# Patient Record
Sex: FEMALE | Race: WHITE | NOT HISPANIC OR LATINO | Employment: UNEMPLOYED | URBAN - METROPOLITAN AREA
[De-identification: names, ages, dates, MRNs, and addresses within clinical notes are randomized per-mention and may not be internally consistent; named-entity substitution may affect disease eponyms.]

---

## 2019-12-02 NOTE — TELEPHONE ENCOUNTER
----- Message from Jazmine Lipscomb sent at 12/2/2019 10:09 AM CST -----  Contact: Megha De Anda is requesting a refill on Qsymia 7.5mg/46mg. [facility] [address] [place] pharm number: [phone]. Pt can be contacted at [phone].

## 2019-12-02 NOTE — TELEPHONE ENCOUNTER
Spoke with patient states she will be following you from [facility] said she been out of meds needs a refill.

## 2020-01-15 ENCOUNTER — TELEPHONE (OUTPATIENT)
Dept: FAMILY MEDICINE | Facility: CLINIC | Age: 35
End: 2020-01-15

## 2020-01-15 NOTE — TELEPHONE ENCOUNTER
It's difficult to say without examining it but it sounds like De Quervain's Tenosynovitis.  I'd recommend taking aleve twice daily for 1 week and if it doesn't resolve, follow up with ortho (specifically hand/wrist surgeon like Dr. Lanre Isaac at [facility], Dr. Claude Williams at [facility], Dr. Robert Lund, Dr. Jorge Cardoza).

## 2020-01-15 NOTE — TELEPHONE ENCOUNTER
Patient states she picked up her child and hurt her wrist. She stated it go better but Sunday it started with pain, pressure and burning in her right wrist closest to her thumb.She states that it is a pulling and burning pain where she makes a fist or moves her hand a certain way.  Next available appointment at [facility] is not until February. Patient wanted to know what Dr. Parker would like he to do.   She is a former  patient

## 2020-01-15 NOTE — TELEPHONE ENCOUNTER
----- Message from Lisha Zee sent at 1/15/2020  8:23 AM CST -----  Contact: pt  Pt called department requesting an appointment with this provider. Provider has seen this provider with [facility] and is transitioning over to [facility]. Pt is requesting to see this provider soon due to a burning sensation in her right hand. Pt stated she is unsure of what Dr. Parker want's her to do regarding her hand. There are no appointments showing in Epic until 3/2020. Is it anyway this pt can be worked in to the schedule for a sooner appointment? Please contact pt to advise.

## 2020-01-15 NOTE — TELEPHONE ENCOUNTER
Patient states that she has been taking tylenol and aleve for two weeks since she hurt her wrist. The pulling and burning pain just started on Monday. Patient did make an appointment to see Dr. Parker to [facility] on 2/5.

## 2020-01-21 ENCOUNTER — PATIENT OUTREACH (OUTPATIENT)
Dept: ADMINISTRATIVE | Facility: HOSPITAL | Age: 35
End: 2020-01-21

## 2020-02-05 ENCOUNTER — OFFICE VISIT (OUTPATIENT)
Dept: FAMILY MEDICINE | Facility: CLINIC | Age: 35
End: 2020-02-05
Payer: COMMERCIAL

## 2020-02-05 VITALS
SYSTOLIC BLOOD PRESSURE: 118 MMHG | DIASTOLIC BLOOD PRESSURE: 74 MMHG | HEIGHT: 61 IN | BODY MASS INDEX: 32.1 KG/M2 | TEMPERATURE: 98 F | WEIGHT: 170 LBS | HEART RATE: 64 BPM | OXYGEN SATURATION: 99 %

## 2020-02-05 DIAGNOSIS — E66.9 OBESITY, UNSPECIFIED CLASSIFICATION, UNSPECIFIED OBESITY TYPE, UNSPECIFIED WHETHER SERIOUS COMORBIDITY PRESENT: ICD-10-CM

## 2020-02-05 DIAGNOSIS — M25.511 ACUTE PAIN OF RIGHT SHOULDER: ICD-10-CM

## 2020-02-05 DIAGNOSIS — E03.9 HYPOTHYROIDISM (ACQUIRED): Primary | ICD-10-CM

## 2020-02-05 PROCEDURE — 99999 PR PBB SHADOW E&M-EST. PATIENT-LVL III: ICD-10-PCS | Mod: PBBFAC,,, | Performed by: INTERNAL MEDICINE

## 2020-02-05 PROCEDURE — 99213 OFFICE O/P EST LOW 20 MIN: CPT | Mod: S$GLB,,, | Performed by: INTERNAL MEDICINE

## 2020-02-05 PROCEDURE — 99213 PR OFFICE/OUTPT VISIT, EST, LEVL III, 20-29 MIN: ICD-10-PCS | Mod: S$GLB,,, | Performed by: INTERNAL MEDICINE

## 2020-02-05 PROCEDURE — 3008F BODY MASS INDEX DOCD: CPT | Mod: CPTII,S$GLB,, | Performed by: INTERNAL MEDICINE

## 2020-02-05 PROCEDURE — 3008F PR BODY MASS INDEX (BMI) DOCUMENTED: ICD-10-PCS | Mod: CPTII,S$GLB,, | Performed by: INTERNAL MEDICINE

## 2020-02-05 PROCEDURE — 99999 PR PBB SHADOW E&M-EST. PATIENT-LVL III: CPT | Mod: PBBFAC,,, | Performed by: INTERNAL MEDICINE

## 2020-02-05 RX ORDER — LEVOTHYROXINE SODIUM 25 UG/1
25 TABLET ORAL
Qty: 90 TABLET | Refills: 3 | Status: SHIPPED | OUTPATIENT
Start: 2020-02-05 | End: 2020-06-05

## 2020-02-05 RX ORDER — LEVOTHYROXINE SODIUM 25 UG/1
25 TABLET ORAL
COMMUNITY
End: 2020-02-05 | Stop reason: SDUPTHER

## 2020-02-05 NOTE — PROGRESS NOTES
[facility] Note    Chief Complaint      Chief Complaint   Patient presents with    Establish Care    Shoulder pain       History of Present Illness      Megha Machado is a 34 y.o. female with chronic conditions of hypothyroidism, obesity who presents today for: re-establish care from  and review chronic conditions.  Reports right shoulder pain with certain movements.  Located on top of shoulder and radiates posteriorly.  Present for the past 2 weeks intermittently.    Hypothyroidism: Controlled on synthroid.  Requesting labs.  Obesity: doing very well with Qsymia.  Has lost 30 lbs since starting qsymia.    Flu shot declines.  Td declines.    Mammogram will discuss baseline with Dr. Rogel.  PAP smear UTD with DR. Rogel.    Past Medical History:  History reviewed. No pertinent past medical history.    Past Surgical History:   has no past surgical history on file.    Family History:  family history is not on file.     Social History:  Social History     Tobacco Use    Smoking status: Never Smoker   Substance Use Topics    Alcohol use: Yes    Drug use: Not on file       Review of Systems   Constitutional: Negative for chills, fever and malaise/fatigue.   Respiratory: Negative for shortness of breath.    Cardiovascular: Negative for chest pain.   Gastrointestinal: Negative for constipation, diarrhea, nausea and vomiting.   Skin: Negative for rash.   Neurological: Negative for weakness.        Medications:  Outpatient Encounter Medications as of 2/5/2020   Medication Sig Dispense Refill    levothyroxine (SYNTHROID) 25 MCG tablet Take 1 tablet (25 mcg total) by mouth before breakfast. 90 tablet 3    phentermine-topiramate (QSYMIA) 7.5-46 mg CM24 Take 1 tablet by mouth daily. 30 capsule 2    [DISCONTINUED] levothyroxine (SYNTHROID) 25 MCG tablet Take 25 mcg by mouth before breakfast.      [DISCONTINUED] phentermine-topiramate (QSYMIA) 7.5-46 mg CM24 Take 1 tablet by mouth daily. 30 capsule 2  "    No facility-administered encounter medications on file as of 2/5/2020.        Allergies:  Review of patient's allergies indicates:   Allergen Reactions    Adhesive      tape    Penicillins        Health Maintenance:    There is no immunization history on file for this patient.   Health Maintenance   Topic Date Due    Lipid Panel  1984    TETANUS VACCINE  10/29/2002    Pap Smear with HPV Cotest  10/29/2005        Physical Exam      Vital Signs  Temp: 98 °F (36.7 °C)  Temp src: Oral  Pulse: 64  SpO2: 99 %  BP: 118/74  BP Location: Right arm  Patient Position: Sitting  Pain Score:   1  Pain Loc: Shoulder  Height and Weight  Height: 5' 1" (154.9 cm)  Weight: 77.1 kg (169 lb 15.6 oz)  BSA (Calculated - sq m): 1.82 sq meters  BMI (Calculated): 32.1  Weight in (lb) to have BMI = 25: 132]    Physical Exam   Constitutional: She appears well-developed and well-nourished.   HENT:   Head: Normocephalic and atraumatic.   Right Ear: External ear normal.   Left Ear: External ear normal.   Mouth/Throat: Oropharynx is clear and moist.   Eyes: Pupils are equal, round, and reactive to light. Conjunctivae and EOM are normal.   Neck: Carotid bruit is not present.   Cardiovascular: Normal rate, regular rhythm, normal heart sounds and intact distal pulses.   No murmur heard.  Pulmonary/Chest: Effort normal and breath sounds normal. She has no wheezes. She has no rales.   Abdominal: Soft. Bowel sounds are normal. She exhibits no distension. There is no hepatosplenomegaly. There is no tenderness.   Musculoskeletal: She exhibits no edema.        Right shoulder: She exhibits tenderness (AC joint).   Negative neer, pato balderrama, empty can, lift off exams on right shoulder   Vitals reviewed.       Laboratory:  CBC:      CMP:        Invalid input(s): CREATININ  URINALYSIS:       LIPIDS:      TSH:      A1C:        Assessment/Plan     Megha Machado is a 34 y.o.female with:    1. Hypothyroidism (acquired)  - levothyroxine " (SYNTHROID) 25 MCG tablet; Take 1 tablet (25 mcg total) by mouth before breakfast.  Dispense: 90 tablet; Refill: 3  Continue current meds.  Will reuqest last labs.  2. Obesity, unspecified classification, unspecified obesity type, unspecified whether serious comorbidity present  - phentermine-topiramate (QSYMIA) 7.5-46 mg CM24; Take 1 tablet by mouth daily.  Dispense: 30 capsule; Refill: 2  Continue current meds.    3. Acute pain of right shoulder  - X-ray Shoulder 2 or More Views Right; Future  TTP over AC joint.  Rotator cuff exam did not elicit symptoms or weakness. Will get xrays.    Chronic conditions status updated as per HPI.  Other than changes above, cont current medications and maintain follow up with specialists.  Return to clinic in 6 months.    Julio Parker MD  [facility]

## 2020-02-07 ENCOUNTER — HOSPITAL ENCOUNTER (OUTPATIENT)
Dept: RADIOLOGY | Facility: HOSPITAL | Age: 35
Discharge: HOME | End: 2020-02-07
Attending: INTERNAL MEDICINE
Payer: COMMERCIAL

## 2020-02-07 DIAGNOSIS — M25.511 ACUTE PAIN OF RIGHT SHOULDER: ICD-10-CM

## 2020-02-07 DIAGNOSIS — M25.511 CHRONIC RIGHT SHOULDER PAIN: Primary | ICD-10-CM

## 2020-02-07 DIAGNOSIS — G89.29 CHRONIC RIGHT SHOULDER PAIN: Primary | ICD-10-CM

## 2020-02-07 PROCEDURE — 73030 XR SHOULDER COMPLETE 2 OR MORE VIEWS RIGHT: ICD-10-PCS | Mod: 26,RT,, | Performed by: RADIOLOGY

## 2020-02-07 PROCEDURE — 73030 X-RAY EXAM OF SHOULDER: CPT | Mod: 26,RT,, | Performed by: RADIOLOGY

## 2020-02-07 PROCEDURE — 73030 X-RAY EXAM OF SHOULDER: CPT | Mod: TC,FY,RT

## 2020-02-07 NOTE — PROGRESS NOTES
Structures in shoulder look ok.  This points to a muscular problem or rotator cuff.  Referring to ortho to further evaluate.

## 2020-02-18 ENCOUNTER — OFFICE VISIT (OUTPATIENT)
Dept: SPORTS MEDICINE | Facility: CLINIC | Age: 35
End: 2020-02-18
Payer: COMMERCIAL

## 2020-02-18 VITALS
DIASTOLIC BLOOD PRESSURE: 90 MMHG | HEIGHT: 61 IN | SYSTOLIC BLOOD PRESSURE: 135 MMHG | HEART RATE: 68 BPM | BODY MASS INDEX: 31.91 KG/M2 | WEIGHT: 169 LBS

## 2020-02-18 DIAGNOSIS — G25.89 SCAPULAR DYSKINESIS: ICD-10-CM

## 2020-02-18 DIAGNOSIS — G89.29 CHRONIC RIGHT SHOULDER PAIN: Primary | ICD-10-CM

## 2020-02-18 DIAGNOSIS — M99.07 SOMATIC DYSFUNCTION OF UPPER EXTREMITY: ICD-10-CM

## 2020-02-18 DIAGNOSIS — M99.01 SOMATIC DYSFUNCTION OF CERVICAL REGION: ICD-10-CM

## 2020-02-18 DIAGNOSIS — M99.08 SOMATIC DYSFUNCTION OF RIB CAGE REGION: ICD-10-CM

## 2020-02-18 DIAGNOSIS — M99.02 SOMATIC DYSFUNCTION OF THORACIC REGION: ICD-10-CM

## 2020-02-18 DIAGNOSIS — M25.511 CHRONIC RIGHT SHOULDER PAIN: Primary | ICD-10-CM

## 2020-02-18 PROCEDURE — 98926 PR OSTEOPATHIC MANIP,3-4 BODY REGN: ICD-10-PCS | Mod: S$GLB,,, | Performed by: ORTHOPAEDIC SURGERY

## 2020-02-18 PROCEDURE — 3008F BODY MASS INDEX DOCD: CPT | Mod: CPTII,S$GLB,, | Performed by: ORTHOPAEDIC SURGERY

## 2020-02-18 PROCEDURE — 99204 OFFICE O/P NEW MOD 45 MIN: CPT | Mod: 25,S$GLB,, | Performed by: ORTHOPAEDIC SURGERY

## 2020-02-18 PROCEDURE — 97110 THERAPEUTIC EXERCISES: CPT | Mod: S$GLB,,, | Performed by: ORTHOPAEDIC SURGERY

## 2020-02-18 PROCEDURE — 3008F PR BODY MASS INDEX (BMI) DOCUMENTED: ICD-10-PCS | Mod: CPTII,S$GLB,, | Performed by: ORTHOPAEDIC SURGERY

## 2020-02-18 PROCEDURE — 98926 OSTEOPATH MANJ 3-4 REGIONS: CPT | Mod: S$GLB,,, | Performed by: ORTHOPAEDIC SURGERY

## 2020-02-18 PROCEDURE — 99999 PR PBB SHADOW E&M-EST. PATIENT-LVL III: CPT | Mod: PBBFAC,,, | Performed by: ORTHOPAEDIC SURGERY

## 2020-02-18 PROCEDURE — 97110 PR THERAPEUTIC EXERCISES: ICD-10-PCS | Mod: S$GLB,,, | Performed by: ORTHOPAEDIC SURGERY

## 2020-02-18 PROCEDURE — 99204 PR OFFICE/OUTPT VISIT, NEW, LEVL IV, 45-59 MIN: ICD-10-PCS | Mod: 25,S$GLB,, | Performed by: ORTHOPAEDIC SURGERY

## 2020-02-18 PROCEDURE — 99999 PR PBB SHADOW E&M-EST. PATIENT-LVL III: ICD-10-PCS | Mod: PBBFAC,,, | Performed by: ORTHOPAEDIC SURGERY

## 2020-02-18 RX ORDER — MELOXICAM 15 MG/1
15 TABLET ORAL DAILY
Qty: 30 TABLET | Refills: 0 | Status: SHIPPED | OUTPATIENT
Start: 2020-02-18 | End: 2022-03-15 | Stop reason: SDUPTHER

## 2020-02-18 NOTE — PROGRESS NOTES
CC: right shoulder pain    34 y.o. Female presents today for evaluation of her right shoulder pain. Patient is unable to recall a specific mechanism of shoulder injury. She reports she feels as though he pain may be spurring from the way she sleeps in a crowded bed with her , dog and three year old son. Patient states she fractured her clavicle as a child while jumping on the bed, and otherwise denies prior history of shoulder injury. When asked where her pain is located she gestures to the superior aspect of her shoulder and the right side of her neck. She describes the quality of her pain as sharp, shooting and throbbing. Patient denies this problem waking her up from her sleep. Denies recent falls or trauma.   How long: Patient reports she has been experiencing right shoulder pain for approximately one month.   What makes it better: Patient admits to her pain improving with rest.   What makes it worse: Patient admits to increased pain with lifting her arm overhead, shrugging her shoulders, and doing repetitive cleaning motions while at work.   Does it radiate: Patient admits to her pain radiating from the superior aspect of her shoulder to the right side of her neck.  Attempted treatments: Patient admits to taking ibuprofen 800 mg as needed. She also states she has tried taking Naprosyn for this problem. She denies prior history of injection or [facility]. Denies formal physical therapy. Denies the use of ice or heat.   Pain score: 0/10  Any mechanical symptoms: Denies mechanical symptoms.  Feelings of instability: Denies feelings of instability.   Affecting ADLs: Patient admits to this problem affecting her ability to perform ADLs. 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     REVIEW OF SYSTEMS:   Constitution: Patient denies fever, chills, night sweats, and weight changes.  Eyes: Patient denies eye pain or vision  change.  HENT: Patient denies any headache, ear pain, sore throat, or nasal discharge.  CVS: Patient denies chest pain.  Lungs: Patient denies any shortness of breath or cough.  Abd: Patient denies any stomach pain, nausea, or vomiting.  Skin: Patient denies any skin rash or itching.    Hematologic/Lymphatic: Patient denies any bleeding problems, or easy bruising.   Musculoskeletal: Patient denies any recent falls. See HPI.  Psych: Patient denies any current anxiety or nervousness.    PAST MEDICAL HISTORY:   History reviewed. No pertinent past medical history.    PAST SURGICAL HISTORY:   History reviewed. No pertinent surgical history.    FAMILY HISTORY:   History reviewed. No pertinent family history.    SOCIAL HISTORY:   Social History     Socioeconomic History    Marital status:      Spouse name: Not on file    Number of children: Not on file    Years of education: Not on file    Highest education level: Not on file   Occupational History    Not on file   Social Needs    Financial resource strain: Not on file    Food insecurity:     Worry: Not on file     Inability: Not on file    Transportation needs:     Medical: Not on file     Non-medical: Not on file   Tobacco Use    Smoking status: Never Smoker   Substance and Sexual Activity    Alcohol use: Yes    Drug use: Not on file    Sexual activity: Not on file   Lifestyle    Physical activity:     Days per week: Not on file     Minutes per session: Not on file    Stress: Not on file   Relationships    Social connections:     Talks on phone: Not on file     Gets together: Not on file     Attends Zoroastrianism service: Not on file     Active member of club or organization: Not on file     Attends meetings of clubs or organizations: Not on file     Relationship status: Not on file   Other Topics Concern    Not on file   Social History Narrative    Not on file       MEDICATIONS:     Current Outpatient Medications:     levothyroxine (SYNTHROID) 25  "MCG tablet, Take 1 tablet (25 mcg total) by mouth before breakfast., Disp: 90 tablet, Rfl: 3    phentermine-topiramate (QSYMIA) 7.5-46 mg CM24, Take 1 tablet by mouth daily., Disp: 30 capsule, Rfl: 2    meloxicam (MOBIC) 15 MG tablet, Take 1 tablet (15 mg total) by mouth once daily., Disp: 30 tablet, Rfl: 0    ALLERGIES:   Review of patient's allergies indicates:   Allergen Reactions    Adhesive      tape    Penicillins         PHYSICAL EXAMINATION:  BP (!) 135/90   Pulse 68   Ht 5' 1" (1.549 m)   Wt 76.7 kg (169 lb)   BMI 31.93 kg/m²   Vitals signs and nursing note have been reviewed.  General: In no acute distress, well developed, well nourished, no diaphoresis  Eyes: EOM full and smooth, no eye redness or discharge  HENT: normocephalic and atraumatic, neck supple, trachea midline, no nasal discharge, no external ear redness or discharge  Cardiovascular: 2+ and symmetric radial bilaterally, no LE edema  Lungs: respirations non-labored, no conversational dyspnea   Abd: non-distended, no rigidity  MSK: no amputation or deformity, no swelling of extremities  Neuro: AAOx3, CN2-12 grossly intact  Skin: No rashes, warm and dry  Psychiatric: cooperative, pleasant, mood and affect appropriate for age    SHOULDER: RIGHT  The affected shoulder is compared to the contralateral shoulder.    Observation:    CERVICAL SPINE  Normal head carriage. Normal thoracic kyphosis.  Full AROM in flexion, extension, sidebending, and rotation.    SHOULDER  No ecchymosis, edema, or erythema throughout the shoulder girdle.  No sternal, clavicular, or acromial deformities bilaterally.  No atrophy of the pectorals, deltoids, supraspinatus, infraspinatus, or biceps bilaterally.  No asymmetry of shoulders bilaterally.    ROM:  Active flexion to 180° on left and 180° on right.   Active abduction to 180° on left and 180° on right.    Active internal rotation to T7 on left and T7 on right.    Active external rotation to T4 on left and T4 on " right.    Scapular dyskinesia on the right    Tenderness:  No tenderness at the SC or AC joint  No tenderness over the clavicle   No tenderness over biceps tendon or bicipital groove  No tenderness over subacromial space  + tenderness along the trapezius muscle on the right    Strength Testing:  Deltoid - 5/5 on left and 5/5 on right  Biceps - 5/5 on left and 5/5 on right  Triceps - 5/5 on left and 5/5 on right  Wrist extension - 5/5 on left and 5/5 on right  Wrist flexion - 5/5 on left and 5/5 on right   - 5/5 on left and 5/5 on right  Finger extension - 5/5 on left and 5/5 on right  Finger abduction - 5/5 on left and 5/5 on right    Special Tests:  Empty can test - negative  Full can test - negative  Bear hug test - negative  Belly press test - negative  Resisted internal rotation - negative  Resisted external rotation - negative    Neer's test - negative  Hawkin's-Sukhjinder test - positive    ORonnis test - negative    Speed's test - negative  Yergason's test - negative    Sulcus sign - none  AP load and shift laxity - none    Posture:  Upright, Increased thoracic kyphosis and Anterior head carriage  Gait: Non-antalgic with Neutral ankle mechanics    TART (Tissue texture abnormality, Asymmetry,  Restriction of motion and/or Tenderness) changes:       Cervical Spine  Thoracic Spine  Lumbar Spine   C1 Neutral T1 TTAR L1    C2 Neutral T2 TTAR L2    C3 TTAR T3 NSRRL L3    C4 TTAR T4 NSRRL L4    C5 TTAR T5 NSRRL L5    C6 TTAR T6 Neutral     C7 TTAR T7 ERSR       T8 FRSR       T9 Neutral       T10 Neutral       T11 Neutral       T12 Neutral       Ribs:  Superior rib 1 on the right  External torsion rib 2-3 on the right  Internal torsion rib 6 on the right    Upper extremity:  Myofascial restriction of the right trapezius muscle and right periscapular musculature  Fascial herniated trigger points along the superior medial aspect of the right scapula    Key   F= Flexed   E = Extended   R = Rotated   S = Sidebent    TTA = tissue texture abnormality       Neurovascular Exam:  2+ radial pulses BL  Sensation intact to light touch in the distal median, radial, and ulnar nerve distributions bilaterally.  Spurlings test - negative  Lhermittes test - negative  Capillary refill intact <2 seconds in all digits bilaterally      IMAGIN. X-ray obtained 2020 due to right shoulder pain   2. X-ray images were reviewed personally by me and then directly with patient.  3. FINDINGS: X-ray images obtained demonstrate the alignment is within normal limits, there is no fracture and no marrow replacement process.  4. IMPRESSION: No pathology or irregularities appreciated.       ASSESSMENT:      ICD-10-CM ICD-9-CM   1. Chronic right shoulder pain M25.511 719.41    G89.29 338.29   2. Scapular dyskinesis G25.89 781.3   3. Somatic dysfunction of upper extremity M99.07 739.7   4. Somatic dysfunction of rib cage region M99.08 739.8   5. Somatic dysfunction of thoracic region M99.02 739.2   6. Somatic dysfunction of cervical region M99.01 739.1       PLAN:  1-2.  Right shoulder pain/scapular dyskinesis -    - Megha admits to right shoulder pain for approximately 1 month.  She denies any specific injury or trauma.  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  When asked where she hurts, she motions to the trapezius and posterior shoulder with 3 fingers.    - XRs obtained 2020 and images were personally reviewed with the patient. See above for further detail.    - Based on her body language/description of pain and somatic dysfunction identified on exam, I discussed osteopathic manipulation as a treatment option today. She consents to evaluation and treatment. See below.     - HEP for scapular stabilization, snow angels, large arm circles, and Scottish prescribed today. Handout provided, explained, and exercises were demonstrated as needed. Encouraged to do daily.  [address] HOME  EXERCISE PROGRAM (HEP):  The patient was taught a homegoing physical therapy regimen as described above. The patient demonstrated understanding of the exercises and proper technique of their execution. This interaction took 15 minutes.     - Mobic 15mg daily for 2 weeks followed by as needed.      3-6.  Somatic dysfunction of upper extremity, ribcage, thoracic, cervical regions -     - OMT 3-4 regions. Oral consent obtained. Reviewed benefits and potential side effects. OMT indicated today due to signs and symptoms as well as local and remote somatic dysfunction findings and their related neurokinetic, lymphatic, fascial and/or arteriovenous body connections. OMT techniques used: Soft Tissue, Myofascial Release, Muscle Energy, High Velocity Low Amplitude and Fascial Distortion Model. Treatment was tolerated well. Improvement noted in segmental mobility post-treatment in dysfunctional regions. There were no adverse events and no complications immediately following treatment. Advised plenty of water to help alleviate soreness.      Future planning includes - Possibly more OMT if helpful and if indicated    All questions were answered to the best of my ability and all concerns were addressed at this time.    Follow up for above as needed.      This note is dictated using the M*Modal Fluency Direct word recognition program. There are word recognition mistakes that are occasionally missed on review.

## 2020-02-18 NOTE — LETTER
February 18, 2020      Julio Parker MD  [address] [facility]  [address]  [place] [address]           [address] [address]  Phone: [phone]          Patient: Megha Machado   MR Number: [id number]   YOB: 1985   Date of Visit: 2/18/2020       Dear Dr. Julio Parker:    Thank you for referring Megha Machado to me for evaluation. Attached you will find relevant portions of my assessment and plan of care.    If you have questions, please do not hesitate to call me. I look forward to following Megha Machado along with you.    Sincerely,    Julio Jean Baptiste, DO    Enclosure  CC:  No Recipients    If you would like to receive this communication electronically, please contact [email or web address] or [phone] to request more information on [employer] access.    For providers and/or their staff who would like to refer a patient to [facility], please contact us through our one-stop-shop provider referral line, [facility], at [phone].    If you feel you have received this communication in error or would no longer like to receive these types of communications, please e-mail [email or web address]

## 2020-04-01 ENCOUNTER — PATIENT MESSAGE (OUTPATIENT)
Dept: FAMILY MEDICINE | Facility: CLINIC | Age: 35
End: 2020-04-01

## 2020-04-01 ENCOUNTER — TELEPHONE (OUTPATIENT)
Dept: FAMILY MEDICINE | Facility: CLINIC | Age: 35
End: 2020-04-01

## 2020-04-01 RX ORDER — AZITHROMYCIN 250 MG/1
TABLET, FILM COATED ORAL
Qty: 6 TABLET | Refills: 0 | Status: SHIPPED | OUTPATIENT
Start: 2020-04-01 | End: 2020-04-06

## 2020-04-01 NOTE — TELEPHONE ENCOUNTER
----- Message from Yun Stevenson sent at 4/1/2020  8:15 AM CDT -----  Contact: self    Name of Caller  patient need to speak with nurse.   Patient states has sinus problem no fever no coughing requesting if doctor can call something into [facility] on [address] [phone]  When is the first available appointment  Symptoms:sinus  Best Call Back Number:[phone]  Additional Information:

## 2020-04-01 NOTE — TELEPHONE ENCOUNTER
Pt had technical difficulties.  Started with facial pressure, postnasal drip.  Denies cough, fever, body aches, decreased energy levels.  No known COVID exposure.  No sick contacts.  Has been taking flonase, mucinex and claritin, vit C.  Will call in zpak for symptoms refractory to OTC.

## 2020-05-28 ENCOUNTER — TELEPHONE (OUTPATIENT)
Dept: FAMILY MEDICINE | Facility: CLINIC | Age: 35
End: 2020-05-28

## 2020-05-28 DIAGNOSIS — R23.3 EASY BRUISING: ICD-10-CM

## 2020-05-28 DIAGNOSIS — Z00.00 ANNUAL PHYSICAL EXAM: Primary | ICD-10-CM

## 2020-05-28 DIAGNOSIS — L65.9 HAIR LOSS: ICD-10-CM

## 2020-05-28 NOTE — TELEPHONE ENCOUNTER
Please schedule virtual visit with me next week.  I'm going to send in some labs she can get ahead of time.  Orders are in at [facility].  If pt prefers, can switch to [employer].

## 2020-05-28 NOTE — TELEPHONE ENCOUNTER
----- Message from Jeanette Calderon MA sent at 5/28/2020  9:44 AM CDT -----  Contact: [phone] / self   Last seen 02/05/20  ----- Message -----  From: Jorge Perez  Sent: 5/28/2020   9:34 AM CDT  To: [facility] Staff    Patient would like to speak with your office states she has been bruising extremely easy and her hair is falling out at an alarming rate. Please Advise.

## 2020-06-02 ENCOUNTER — OFFICE VISIT (OUTPATIENT)
Dept: FAMILY MEDICINE | Facility: CLINIC | Age: 35
End: 2020-06-02
Payer: COMMERCIAL

## 2020-06-02 DIAGNOSIS — E03.9 HYPOTHYROIDISM (ACQUIRED): Primary | ICD-10-CM

## 2020-06-02 DIAGNOSIS — R23.3 EASY BRUISING: ICD-10-CM

## 2020-06-02 DIAGNOSIS — L65.9 HAIR LOSS: ICD-10-CM

## 2020-06-02 DIAGNOSIS — R60.0 PERIPHERAL EDEMA: ICD-10-CM

## 2020-06-02 PROCEDURE — 99214 OFFICE O/P EST MOD 30 MIN: CPT | Mod: 95,,, | Performed by: INTERNAL MEDICINE

## 2020-06-02 PROCEDURE — 99214 PR OFFICE/OUTPT VISIT, EST, LEVL IV, 30-39 MIN: ICD-10-PCS | Mod: 95,,, | Performed by: INTERNAL MEDICINE

## 2020-06-02 RX ORDER — TRIAMTERENE/HYDROCHLOROTHIAZID 37.5-25 MG
1 TABLET ORAL DAILY
Qty: 30 TABLET | Refills: 11 | Status: SHIPPED | OUTPATIENT
Start: 2020-06-02 | End: 2024-01-09

## 2020-06-02 NOTE — PROGRESS NOTES
[facility] Virtual Visit Note    The patient location is:[place]  The chief complaint leading to consultation is: complaints  Visit type: Virtual visit with synchronous audio and video  Total time spent with patient: 15 min  Each patient to whom he or she provides medical services by telemedicine is:  (1) informed of the relationship between the physician and patient and the respective role of any other health care provider with respect to management of the patient; and (2) notified that he or she may decline to receive medical services by telemedicine and may withdraw from such care at any time.      Chief Complaint      Chief Complaint   Patient presents with    Bleeding/Bruising    Hair Loss    Leg Swelling       History of Present Illness      Megha Machado is a 34 y.o. female with chronic conditions of hypothyroidism who presents today for: complaints of easy bruising for the past few weeks.  Has noticed two large bruises in the past 2 weeks which have resolved. Has been having easy bruising for the past month but more slightly.  Has also noticed worsened hair loss in the same timeframe.    Has been taking synthroid 25 mcg daily.      Past Medical History:  History reviewed. No pertinent past medical history.    Past Surgical History:   has no past surgical history on file.    Family History:  family history is not on file.     Social History:  Social History     Tobacco Use    Smoking status: Never Smoker   Substance Use Topics    Alcohol use: Yes    Drug use: Not on file       Review of Systems   Constitutional: Negative for chills, fever and malaise/fatigue.   HENT: Negative for hearing loss.    Eyes: Negative for discharge.   Respiratory: Negative for shortness of breath and wheezing.    Cardiovascular: Negative for chest pain and palpitations.   Gastrointestinal: Negative for blood in stool, constipation, diarrhea, nausea and vomiting.   Genitourinary: Negative for dysuria and  hematuria.   Musculoskeletal: Negative for neck pain.   Skin: Negative for rash.   Neurological: Negative for weakness and headaches.   Endo/Heme/Allergies: Positive for polydipsia.        Medications:  Outpatient Encounter Medications as of 6/2/2020   Medication Sig Dispense Refill    levothyroxine (SYNTHROID) 25 MCG tablet Take 1 tablet (25 mcg total) by mouth before breakfast. 90 tablet 3    meloxicam (MOBIC) 15 MG tablet Take 1 tablet (15 mg total) by mouth once daily. 30 tablet 0    phentermine-topiramate (QSYMIA) 7.5-46 mg CM24 Take 1 tablet by mouth daily. 30 capsule 2    triamterene-hydrochlorothiazide 37.5-25 mg (MAXZIDE-25) 37.5-25 mg per tablet Take 1 tablet by mouth once daily. 30 tablet 11     No facility-administered encounter medications on file as of 6/2/2020.        Allergies:  Review of patient's allergies indicates:   Allergen Reactions    Adhesive      tape    Penicillins        Health Maintenance:    There is no immunization history on file for this patient.   Health Maintenance   Topic Date Due    TETANUS VACCINE  10/29/2002    Pap Smear with HPV Cotest  10/29/2005    Lipid Panel  Completed        Physical Exam       ]    Physical Exam   Constitutional: She is oriented to person, place, and time. She appears well-developed and well-nourished. No distress.   Eyes: Conjunctivae and EOM are normal. Right eye exhibits no discharge. Left eye exhibits no discharge. No scleral icterus.   Pulmonary/Chest: Effort normal. No respiratory distress.   Neurological: She is alert and oriented to person, place, and time.   Skin: She is not diaphoretic.   Psychiatric: She has a normal mood and affect. Her behavior is normal. Judgment and thought content normal.        Laboratory:  CBC:  Recent Labs   Lab 06/01/20  0818   WBC 5.5   RBC 4.73   Hemoglobin 13.6   Hematocrit 41.8   Platelets 259   Mean Corpuscular Volume 88.4   Mean Corpuscular Hemoglobin 28.8   Mean Corpuscular Hemoglobin Conc 32.5      CMP:  Recent Labs   Lab 06/01/20  0818   Glucose 92   Calcium 8.7   Albumin 4.0   Total Protein 6.5   Sodium 137   Potassium 4.5   CO2 28   Chloride 104   BUN, Bld 16   Alkaline Phosphatase 94   ALT 23   AST 20   Total Bilirubin 0.4     URINALYSIS:       LIPIDS:  Recent Labs   Lab 06/01/20  0818   HDL 54   Cholesterol 220 H   Triglycerides 74   LDL Cholesterol 148 H   Hdl/Cholesterol Ratio 4.1   Non HDL Chol. (LDL+VLDL) 166 H     TSH:      A1C:        Assessment/Plan     Megha Machado is a 34 y.o.female with:    1. Peripheral edema, new to me  - triamterene-hydrochlorothiazide 37.5-25 mg (MAXZIDE-25) 37.5-25 mg per tablet; Take 1 tablet by mouth once daily.  Dispense: 30 tablet; Refill: 11  Recommended compression, elevation, sodium restriction.  Possibly due to undertreated hypothyroidism, labs pending.  Will give maxzide to control as needed.  2. Easy bruising, new problem  Labs ordered, reviewed with pt.  No signs of underlying clotting disorder.    3. Hair loss, new problem  Repeat thyroid labs ordered/pending.  May need to increase synthroid dose.    4. Hypothyroidism (acquired)  See above.        Julio Parker MD  [facility]                Answers for HPI/ROS submitted by the patient on 5/31/2020   activity change: No  unexpected weight change: Yes  rhinorrhea: No  trouble swallowing: No  visual disturbance: No  chest tightness: No  polyuria: No  difficulty urinating: No  menstrual problem: No  joint swelling: Yes  arthralgias: No  confusion: No  dysphoric mood: No

## 2020-06-04 ENCOUNTER — TELEPHONE (OUTPATIENT)
Dept: FAMILY MEDICINE | Facility: CLINIC | Age: 35
End: 2020-06-04

## 2020-06-04 DIAGNOSIS — E03.9 HYPOTHYROIDISM (ACQUIRED): ICD-10-CM

## 2020-06-04 LAB
ALBUMIN SERPL-MCNC: 4 G/DL (ref 3.6–5.1)
ALBUMIN/GLOB SERPL: 1.6 (CALC) (ref 1–2.5)
ALP SERPL-CCNC: 94 U/L (ref 31–125)
ALT SERPL-CCNC: 23 U/L (ref 6–29)
APTT PPP: 32 SEC (ref 22–34)
AST SERPL-CCNC: 20 U/L (ref 10–30)
BASOPHILS # BLD AUTO: 61 CELLS/UL (ref 0–200)
BASOPHILS NFR BLD AUTO: 1.1 %
BILIRUB SERPL-MCNC: 0.4 MG/DL (ref 0.2–1.2)
BUN SERPL-MCNC: 16 MG/DL (ref 7–25)
BUN/CREAT SERPL: NORMAL (CALC) (ref 6–22)
CALCIUM SERPL-MCNC: 8.7 MG/DL (ref 8.6–10.2)
CHLORIDE SERPL-SCNC: 104 MMOL/L (ref 98–110)
CHOLEST SERPL-MCNC: 220 MG/DL
CHOLEST/HDLC SERPL: 4.1 (CALC)
CO2 SERPL-SCNC: 28 MMOL/L (ref 20–32)
CREAT SERPL-MCNC: 0.86 MG/DL (ref 0.5–1.1)
EOSINOPHIL # BLD AUTO: 209 CELLS/UL (ref 15–500)
EOSINOPHIL NFR BLD AUTO: 3.8 %
ERYTHROCYTE [DISTWIDTH] IN BLOOD BY AUTOMATED COUNT: 12.9 % (ref 11–15)
GFRSERPLBLD MDRD-ARVRAT: 88 ML/MIN/1.73M2
GLOBULIN SER CALC-MCNC: 2.5 G/DL (CALC) (ref 1.9–3.7)
GLUCOSE SERPL-MCNC: 92 MG/DL (ref 65–99)
HCT VFR BLD AUTO: 41.8 % (ref 35–45)
HDLC SERPL-MCNC: 54 MG/DL
HGB BLD-MCNC: 13.6 G/DL (ref 11.7–15.5)
INR PPP: 1
LDLC SERPL CALC-MCNC: 148 MG/DL (CALC)
LYMPHOCYTES # BLD AUTO: 1650 CELLS/UL (ref 850–3900)
LYMPHOCYTES NFR BLD AUTO: 30 %
MCH RBC QN AUTO: 28.8 PG (ref 27–33)
MCHC RBC AUTO-ENTMCNC: 32.5 G/DL (ref 32–36)
MCV RBC AUTO: 88.4 FL (ref 80–100)
MONOCYTES # BLD AUTO: 545 CELLS/UL (ref 200–950)
MONOCYTES NFR BLD AUTO: 9.9 %
NEUTROPHILS # BLD AUTO: 3036 CELLS/UL (ref 1500–7800)
NEUTROPHILS NFR BLD AUTO: 55.2 %
NONHDLC SERPL-MCNC: 166 MG/DL (CALC)
PLATELET # BLD AUTO: 259 THOUSAND/UL (ref 140–400)
PMV BLD REES-ECKER: 10.7 FL (ref 7.5–12.5)
POTASSIUM SERPL-SCNC: 4.5 MMOL/L (ref 3.5–5.3)
PROT SERPL-MCNC: 6.5 G/DL (ref 6.1–8.1)
PROTHROMBIN TIME: 10.3 SEC (ref 9–11.5)
RBC # BLD AUTO: 4.73 MILLION/UL (ref 3.8–5.1)
SODIUM SERPL-SCNC: 137 MMOL/L (ref 135–146)
T4 FREE SERPL-MCNC: 1 NG/DL (ref 0.8–1.8)
TRIGL SERPL-MCNC: 74 MG/DL
TSH SERPL-ACNC: 4.82 MIU/L
WBC # BLD AUTO: 5.5 THOUSAND/UL (ref 3.8–10.8)

## 2020-06-04 NOTE — TELEPHONE ENCOUNTER
----- Message from Janeth Morris sent at 6/4/2020  3:28 PM CDT -----  Contact: self [phone]  Patient called in requesting to speak with you. Patient prefers to speak with a nurse. Please advise.

## 2020-06-05 RX ORDER — LEVOTHYROXINE SODIUM 50 UG/1
50 TABLET ORAL
Qty: 90 TABLET | Refills: 3 | Status: SHIPPED | OUTPATIENT
Start: 2020-06-05 | End: 2020-08-05

## 2020-06-05 NOTE — TELEPHONE ENCOUNTER
Pt asking for results on free t4, said when you went over all of her other labs, this one was not back yet.

## 2020-06-05 NOTE — TELEPHONE ENCOUNTER
----- Message from Nisha Valle sent at 6/5/2020 11:22 AM CDT -----  Contact: [phone]/ self   Patient requesting to speak with you regarding test results. Please call.

## 2020-06-05 NOTE — TELEPHONE ENCOUNTER
Since thyroid function was borderline low, I want to try increasing synthroid to 50 mcg and see if there is any noticeable improvement.  New prescription sent

## 2020-06-05 NOTE — TELEPHONE ENCOUNTER
Pt aware, asking what could be causing the bruising/hairloss. Asking if she should stay off of the meds?

## 2020-07-06 ENCOUNTER — TELEPHONE (OUTPATIENT)
Dept: FAMILY MEDICINE | Facility: CLINIC | Age: 35
End: 2020-07-06

## 2020-07-06 NOTE — TELEPHONE ENCOUNTER
When she says we changed her meds, does she mean the fluid pill we started at last visit?  Or change in dose of thyroid medication?

## 2020-07-06 NOTE — TELEPHONE ENCOUNTER
For the last 2-3 days has been getting very nauseated after she eats or drinks, anything including water. You did change her medications not sure if that Is what is causing it.

## 2020-07-06 NOTE — TELEPHONE ENCOUNTER
Try taking pepcid twice daily for a few days to see if that helps.  If it gets much worse, go to urgent care.  If the pepcid helps, call in a few days to report progress.

## 2020-07-06 NOTE — TELEPHONE ENCOUNTER
----- Message from Radha Castañeda sent at 7/6/2020  8:53 AM CDT -----  Contact: SELF [phone]  Patient would like to speak with you about having nausea after eating and drinking. Please advise

## 2020-07-06 NOTE — TELEPHONE ENCOUNTER
Patient states she is not sure if it is the medication because she has been on medication for a while. Patient states she is not pregnant ,she do not know what is going on to make her feel this way

## 2020-07-22 ENCOUNTER — TELEPHONE (OUTPATIENT)
Dept: ADMINISTRATIVE | Facility: HOSPITAL | Age: 35
End: 2020-07-22

## 2020-07-22 ENCOUNTER — PATIENT OUTREACH (OUTPATIENT)
Dept: ADMINISTRATIVE | Facility: HOSPITAL | Age: 35
End: 2020-07-22

## 2020-07-27 ENCOUNTER — TELEPHONE (OUTPATIENT)
Dept: FAMILY MEDICINE | Facility: CLINIC | Age: 35
End: 2020-07-27

## 2020-07-27 DIAGNOSIS — E03.9 HYPOTHYROIDISM (ACQUIRED): Primary | ICD-10-CM

## 2020-07-27 NOTE — TELEPHONE ENCOUNTER
----- Message from René Rivera sent at 7/27/2020  3:34 PM CDT -----  Type:  Needs Medical Advice    Who Called: self  Reason:Patient isnt sure if you want her to have blood work before her appointment or not. If you do then she is going to need orders   Would the patient rather a call back or a response via [facility]? callback  Best Call Back Number: [phone]  Additional Information: none

## 2020-08-01 LAB — TSH SERPL-ACNC: 3.29 MIU/L

## 2020-08-05 ENCOUNTER — OFFICE VISIT (OUTPATIENT)
Dept: FAMILY MEDICINE | Facility: CLINIC | Age: 35
End: 2020-08-05
Payer: COMMERCIAL

## 2020-08-05 ENCOUNTER — TELEPHONE (OUTPATIENT)
Dept: ADMINISTRATIVE | Facility: HOSPITAL | Age: 35
End: 2020-08-05

## 2020-08-05 VITALS
TEMPERATURE: 98 F | HEART RATE: 64 BPM | HEIGHT: 61 IN | SYSTOLIC BLOOD PRESSURE: 102 MMHG | OXYGEN SATURATION: 100 % | BODY MASS INDEX: 32.63 KG/M2 | DIASTOLIC BLOOD PRESSURE: 70 MMHG | WEIGHT: 172.81 LBS

## 2020-08-05 DIAGNOSIS — E66.9 OBESITY, UNSPECIFIED CLASSIFICATION, UNSPECIFIED OBESITY TYPE, UNSPECIFIED WHETHER SERIOUS COMORBIDITY PRESENT: ICD-10-CM

## 2020-08-05 DIAGNOSIS — R60.0 PERIPHERAL EDEMA: ICD-10-CM

## 2020-08-05 DIAGNOSIS — E03.9 HYPOTHYROIDISM (ACQUIRED): Primary | ICD-10-CM

## 2020-08-05 DIAGNOSIS — Z11.59 SCREENING FOR VIRAL DISEASE: ICD-10-CM

## 2020-08-05 DIAGNOSIS — Z11.4 SCREENING FOR HIV (HUMAN IMMUNODEFICIENCY VIRUS): ICD-10-CM

## 2020-08-05 PROCEDURE — 3008F BODY MASS INDEX DOCD: CPT | Mod: CPTII,S$GLB,, | Performed by: INTERNAL MEDICINE

## 2020-08-05 PROCEDURE — 3008F PR BODY MASS INDEX (BMI) DOCUMENTED: ICD-10-PCS | Mod: CPTII,S$GLB,, | Performed by: INTERNAL MEDICINE

## 2020-08-05 PROCEDURE — 99999 PR PBB SHADOW E&M-EST. PATIENT-LVL III: ICD-10-PCS | Mod: PBBFAC,,, | Performed by: INTERNAL MEDICINE

## 2020-08-05 PROCEDURE — 99213 OFFICE O/P EST LOW 20 MIN: CPT | Mod: S$GLB,,, | Performed by: INTERNAL MEDICINE

## 2020-08-05 PROCEDURE — 99999 PR PBB SHADOW E&M-EST. PATIENT-LVL III: CPT | Mod: PBBFAC,,, | Performed by: INTERNAL MEDICINE

## 2020-08-05 PROCEDURE — 99213 PR OFFICE/OUTPT VISIT, EST, LEVL III, 20-29 MIN: ICD-10-PCS | Mod: S$GLB,,, | Performed by: INTERNAL MEDICINE

## 2020-08-05 RX ORDER — LEVOTHYROXINE SODIUM 75 UG/1
75 TABLET ORAL
Qty: 90 TABLET | Refills: 3 | Status: SHIPPED | OUTPATIENT
Start: 2020-08-05 | End: 2021-02-09

## 2020-08-05 NOTE — PROGRESS NOTES
[facility] Note    Chief Complaint      Chief Complaint   Patient presents with    Follow-up     thyroid 6 month        History of Present Illness      Megha Machado is a 34 y.o. female with chronic conditions of hypothyroidism, peripheral edema who presents today for: follow up chronic conditions.  Hypothyroidism: better controlled on synthroid on higher dose of synthroid.  TSH improved.  Still losing hair.  Peripheral edema: improved on maxzide as needed.  Flu shot declines.  Td declines.    Mammogram will discuss baseline with Dr. Rogel.  PAP smear UTD with DR. Rogel.    Past Medical History:  History reviewed. No pertinent past medical history.    Past Surgical History:   has a past surgical history that includes  section (06, 08, 16) and Tubal ligation (16).    Family History:  family history is not on file.     Social History:  Social History     Tobacco Use    Smoking status: Never Smoker   Substance Use Topics    Alcohol use: Yes     Alcohol/week: 1.0 standard drinks     Types: 1 Glasses of wine per week     Frequency: 2-4 times a month     Drinks per session: 1 or 2     Binge frequency: Never    Drug use: Never       Review of Systems   Constitutional: Negative for chills, fever and malaise/fatigue.   HENT: Negative for hearing loss.    Eyes: Negative for discharge.   Respiratory: Negative for shortness of breath and wheezing.    Cardiovascular: Negative for chest pain and palpitations.   Gastrointestinal: Negative for blood in stool, constipation, diarrhea, nausea and vomiting.   Genitourinary: Negative for dysuria and hematuria.   Musculoskeletal: Negative for neck pain.   Skin: Negative for rash.   Neurological: Negative for weakness and headaches.   Endo/Heme/Allergies: Negative for polydipsia.        Medications:  Outpatient Encounter Medications as of 2020   Medication Sig Dispense Refill    levothyroxine (SYNTHROID) 75 MCG tablet Take 1  "tablet (75 mcg total) by mouth before breakfast. 90 tablet 3    meloxicam (MOBIC) 15 MG tablet Take 1 tablet (15 mg total) by mouth once daily. 30 tablet 0    triamterene-hydrochlorothiazide 37.5-25 mg (MAXZIDE-25) 37.5-25 mg per tablet Take 1 tablet by mouth once daily. 30 tablet 11    [DISCONTINUED] levothyroxine (SYNTHROID) 50 MCG tablet Take 1 tablet (50 mcg total) by mouth before breakfast. 90 tablet 3    [DISCONTINUED] phentermine-topiramate (QSYMIA) 7.5-46 mg CM24 Take 1 tablet by mouth daily. 30 capsule 2     No facility-administered encounter medications on file as of 8/5/2020.        Allergies:  Review of patient's allergies indicates:   Allergen Reactions    Adhesive      tape    Penicillins        Health Maintenance:  Immunization History   Administered Date(s) Administered    Influenza 02/12/2020      Health Maintenance   Topic Date Due    Hepatitis C Screening  1984    TETANUS VACCINE  10/29/2002    Lipid Panel  Completed        Physical Exam      Vital Signs  Temp: 97.8 °F (36.6 °C)  Temp src: Oral  Pulse: 64  SpO2: 100 %  BP: 102/70  BP Location: Right arm  Patient Position: Sitting  Pain Score: 0-No pain  Height and Weight  Height: 5' 1" (154.9 cm)  Weight: 78.4 kg (172 lb 13.5 oz)  BSA (Calculated - sq m): 1.84 sq meters  BMI (Calculated): 32.7  Weight in (lb) to have BMI = 25: 132]    Physical Exam  Vitals signs reviewed.   Constitutional:       Appearance: She is well-developed.   HENT:      Head: Normocephalic and atraumatic.      Right Ear: External ear normal.      Left Ear: External ear normal.   Cardiovascular:      Rate and Rhythm: Normal rate and regular rhythm.      Heart sounds: Normal heart sounds. No murmur.   Pulmonary:      Effort: Pulmonary effort is normal.      Breath sounds: Normal breath sounds. No wheezing or rales.   Abdominal:      General: Bowel sounds are normal. There is no distension.      Palpations: Abdomen is soft.      Tenderness: There is no abdominal " tenderness.          Laboratory:  CBC:  Recent Labs   Lab 06/01/20 0818   WBC 5.5   RBC 4.73   Hemoglobin 13.6   Hematocrit 41.8   Platelets 259   Mean Corpuscular Volume 88.4   Mean Corpuscular Hemoglobin 28.8   Mean Corpuscular Hemoglobin Conc 32.5     CMP:  Recent Labs   Lab 06/01/20  0818   Glucose 92   Calcium 8.7   Albumin 4.0   Total Protein 6.5   Sodium 137   Potassium 4.5   CO2 28   Chloride 104   BUN, Bld 16   Alkaline Phosphatase 94   ALT 23   AST 20   Total Bilirubin 0.4     URINALYSIS:       LIPIDS:  Recent Labs   Lab 06/01/20  0818 07/31/20  0758   TSH 4.82 H 3.29   HDL 54  --    Cholesterol 220 H  --    Triglycerides 74  --    LDL Cholesterol 148 H  --    Hdl/Cholesterol Ratio 4.1  --    Non HDL Chol. (LDL+VLDL) 166 H  --      TSH:  Recent Labs   Lab 06/01/20 0818 07/31/20  0758   TSH 4.82 H 3.29     A1C:        Assessment/Plan     Megha Machado is a 34 y.o.female with:    1. Hypothyroidism (acquired)  - levothyroxine (SYNTHROID) 75 MCG tablet; Take 1 tablet (75 mcg total) by mouth before breakfast.  Dispense: 90 tablet; Refill: 3  Increase synthroid to 75 mcg.  Recheck with next labs.  2. Obesity, unspecified classification, unspecified obesity type, unspecified whether serious comorbidity present  Will start exercising regularly.    3. Peripheral edema  Improving on maxzide.  Cont as needed.    Chronic conditions status updated as per HPI.  Other than changes above, cont current medications and maintain follow up with specialists.  Return to clinic in 6 months.    Julio Parker MD  [facility]        Answers for HPI/ROS submitted by the patient on 8/3/2020   activity change: No  unexpected weight change: No  rhinorrhea: No  trouble swallowing: No  visual disturbance: No  chest tightness: No  polyuria: No  difficulty urinating: No  menstrual problem: No  joint swelling: No  arthralgias: No  confusion: No  dysphoric mood: No

## 2020-09-24 ENCOUNTER — TELEPHONE (OUTPATIENT)
Dept: ADMINISTRATIVE | Facility: HOSPITAL | Age: 35
End: 2020-09-24

## 2020-11-10 ENCOUNTER — TELEPHONE (OUTPATIENT)
Dept: FAMILY MEDICINE | Facility: CLINIC | Age: 35
End: 2020-11-10

## 2020-11-10 NOTE — TELEPHONE ENCOUNTER
Patient is calling because she was mopping last Thursday and tripped over the dog. Patient landed on her right elbow and she slammed her shoulder into her neck. Patient is having a constant pain in her neck and if she moves a certain way or turns she gets a shooting pain down her arm and neck. Patient has tried tylenol and ibuprofen she does not want any pain  Medication just wants recommendations on what to do to help with the pain

## 2020-11-10 NOTE — TELEPHONE ENCOUNTER
----- Message from Candice Braxton sent at 11/10/2020 12:21 PM CST -----  Contact: Patient  Type:  Needs Medical Advice    Who Called: Patient  Symptoms (please be specific): Had a fall and hurt elbow    How long has patient had these symptoms:  Last Thursday and not getting better she now has a shooting pain from elbow to neck and arm Tingling  Would the patient rather a call back or a response via [facility]?  call  Best Call Back Number:  [phone]  Additional Information:

## 2021-01-26 ENCOUNTER — PATIENT OUTREACH (OUTPATIENT)
Dept: ADMINISTRATIVE | Facility: HOSPITAL | Age: 36
End: 2021-01-26

## 2021-02-05 LAB
ALBUMIN SERPL-MCNC: 4.3 G/DL (ref 3.6–5.1)
ALBUMIN/GLOB SERPL: 1.7 (CALC) (ref 1–2.5)
ALP SERPL-CCNC: 99 U/L (ref 31–125)
ALT SERPL-CCNC: 16 U/L (ref 6–29)
AST SERPL-CCNC: 17 U/L (ref 10–30)
BILIRUB SERPL-MCNC: 0.4 MG/DL (ref 0.2–1.2)
BUN SERPL-MCNC: 14 MG/DL (ref 7–25)
BUN/CREAT SERPL: ABNORMAL (CALC) (ref 6–22)
CALCIUM SERPL-MCNC: 9.1 MG/DL (ref 8.6–10.2)
CHLORIDE SERPL-SCNC: 103 MMOL/L (ref 98–110)
CO2 SERPL-SCNC: 27 MMOL/L (ref 20–32)
CREAT SERPL-MCNC: 0.88 MG/DL (ref 0.5–1.1)
GFRSERPLBLD MDRD-ARVRAT: 85 ML/MIN/1.73M2
GLOBULIN SER CALC-MCNC: 2.5 G/DL (CALC) (ref 1.9–3.7)
GLUCOSE SERPL-MCNC: 105 MG/DL (ref 65–99)
HCV AB S/CO SERPL IA: 0.02
HCV AB SERPL QL IA: NORMAL
HIV 1+2 AB+HIV1 P24 AG SERPL QL IA: NORMAL
POTASSIUM SERPL-SCNC: 4.6 MMOL/L (ref 3.5–5.3)
PROT SERPL-MCNC: 6.8 G/DL (ref 6.1–8.1)
SODIUM SERPL-SCNC: 141 MMOL/L (ref 135–146)
T4 FREE SERPL-MCNC: 1 NG/DL (ref 0.8–1.8)
TSH SERPL-ACNC: 3.58 MIU/L

## 2021-02-09 ENCOUNTER — TELEPHONE (OUTPATIENT)
Dept: ADMINISTRATIVE | Facility: HOSPITAL | Age: 36
End: 2021-02-09

## 2021-02-09 ENCOUNTER — OFFICE VISIT (OUTPATIENT)
Dept: FAMILY MEDICINE | Facility: CLINIC | Age: 36
End: 2021-02-09
Payer: COMMERCIAL

## 2021-02-09 VITALS
DIASTOLIC BLOOD PRESSURE: 76 MMHG | SYSTOLIC BLOOD PRESSURE: 114 MMHG | TEMPERATURE: 98 F | HEART RATE: 64 BPM | HEIGHT: 61 IN | OXYGEN SATURATION: 97 % | RESPIRATION RATE: 18 BRPM | WEIGHT: 168.44 LBS | BODY MASS INDEX: 31.8 KG/M2

## 2021-02-09 DIAGNOSIS — R60.0 PERIPHERAL EDEMA: ICD-10-CM

## 2021-02-09 DIAGNOSIS — E03.9 HYPOTHYROIDISM (ACQUIRED): Primary | ICD-10-CM

## 2021-02-09 PROCEDURE — 1126F PR PAIN SEVERITY QUANTIFIED, NO PAIN PRESENT: ICD-10-PCS | Mod: S$GLB,,, | Performed by: INTERNAL MEDICINE

## 2021-02-09 PROCEDURE — 99999 PR PBB SHADOW E&M-EST. PATIENT-LVL III: ICD-10-PCS | Mod: PBBFAC,,, | Performed by: INTERNAL MEDICINE

## 2021-02-09 PROCEDURE — 1126F AMNT PAIN NOTED NONE PRSNT: CPT | Mod: S$GLB,,, | Performed by: INTERNAL MEDICINE

## 2021-02-09 PROCEDURE — 99214 PR OFFICE/OUTPT VISIT, EST, LEVL IV, 30-39 MIN: ICD-10-PCS | Mod: S$GLB,,, | Performed by: INTERNAL MEDICINE

## 2021-02-09 PROCEDURE — 3008F BODY MASS INDEX DOCD: CPT | Mod: CPTII,S$GLB,, | Performed by: INTERNAL MEDICINE

## 2021-02-09 PROCEDURE — 3008F PR BODY MASS INDEX (BMI) DOCUMENTED: ICD-10-PCS | Mod: CPTII,S$GLB,, | Performed by: INTERNAL MEDICINE

## 2021-02-09 PROCEDURE — 99214 OFFICE O/P EST MOD 30 MIN: CPT | Mod: S$GLB,,, | Performed by: INTERNAL MEDICINE

## 2021-02-09 PROCEDURE — 99999 PR PBB SHADOW E&M-EST. PATIENT-LVL III: CPT | Mod: PBBFAC,,, | Performed by: INTERNAL MEDICINE

## 2021-02-09 RX ORDER — LEVOTHYROXINE SODIUM 100 UG/1
100 TABLET ORAL
Qty: 90 TABLET | Refills: 3 | Status: SHIPPED | OUTPATIENT
Start: 2021-02-09 | End: 2022-02-21

## 2021-05-03 ENCOUNTER — PATIENT MESSAGE (OUTPATIENT)
Dept: FAMILY MEDICINE | Facility: CLINIC | Age: 36
End: 2021-05-03

## 2021-07-27 ENCOUNTER — PATIENT OUTREACH (OUTPATIENT)
Dept: ADMINISTRATIVE | Facility: HOSPITAL | Age: 36
End: 2021-07-27

## 2021-07-27 ENCOUNTER — TELEPHONE (OUTPATIENT)
Dept: ADMINISTRATIVE | Facility: HOSPITAL | Age: 36
End: 2021-07-27

## 2021-08-03 ENCOUNTER — TELEPHONE (OUTPATIENT)
Dept: FAMILY MEDICINE | Facility: CLINIC | Age: 36
End: 2021-08-03

## 2021-08-03 DIAGNOSIS — E03.9 HYPOTHYROIDISM (ACQUIRED): ICD-10-CM

## 2021-08-03 DIAGNOSIS — Z00.00 ANNUAL PHYSICAL EXAM: Primary | ICD-10-CM

## 2021-08-05 ENCOUNTER — IMMUNIZATION (OUTPATIENT)
Dept: INTERNAL MEDICINE | Facility: CLINIC | Age: 36
End: 2021-08-05
Payer: COMMERCIAL

## 2021-08-05 DIAGNOSIS — Z23 NEED FOR VACCINATION: Primary | ICD-10-CM

## 2021-08-05 PROCEDURE — 91300 COVID-19, MRNA, LNP-S, PF, 30 MCG/0.3 ML DOSE VACCINE: CPT | Mod: ,,, | Performed by: INTERNAL MEDICINE

## 2021-08-05 PROCEDURE — 0001A COVID-19, MRNA, LNP-S, PF, 30 MCG/0.3 ML DOSE VACCINE: CPT | Mod: CV19,,, | Performed by: INTERNAL MEDICINE

## 2021-08-05 PROCEDURE — 0001A COVID-19, MRNA, LNP-S, PF, 30 MCG/0.3 ML DOSE VACCINE: ICD-10-PCS | Mod: CV19,,, | Performed by: INTERNAL MEDICINE

## 2021-08-05 PROCEDURE — 91300 COVID-19, MRNA, LNP-S, PF, 30 MCG/0.3 ML DOSE VACCINE: ICD-10-PCS | Mod: ,,, | Performed by: INTERNAL MEDICINE

## 2021-08-06 LAB
ALBUMIN SERPL-MCNC: 4.2 G/DL (ref 3.6–5.1)
ALBUMIN/GLOB SERPL: 1.6 (CALC) (ref 1–2.5)
ALP SERPL-CCNC: 102 U/L (ref 31–125)
ALT SERPL-CCNC: 11 U/L (ref 6–29)
AST SERPL-CCNC: 13 U/L (ref 10–30)
BASOPHILS # BLD AUTO: 80 CELLS/UL (ref 0–200)
BASOPHILS NFR BLD AUTO: 1.2 %
BILIRUB SERPL-MCNC: 0.4 MG/DL (ref 0.2–1.2)
BUN SERPL-MCNC: 11 MG/DL (ref 7–25)
BUN/CREAT SERPL: NORMAL (CALC) (ref 6–22)
CALCIUM SERPL-MCNC: 9.2 MG/DL (ref 8.6–10.2)
CHLORIDE SERPL-SCNC: 103 MMOL/L (ref 98–110)
CHOLEST SERPL-MCNC: 225 MG/DL
CHOLEST/HDLC SERPL: 4.5 (CALC)
CO2 SERPL-SCNC: 27 MMOL/L (ref 20–32)
CREAT SERPL-MCNC: 0.87 MG/DL (ref 0.5–1.1)
EOSINOPHIL # BLD AUTO: 255 CELLS/UL (ref 15–500)
EOSINOPHIL NFR BLD AUTO: 3.8 %
ERYTHROCYTE [DISTWIDTH] IN BLOOD BY AUTOMATED COUNT: 12.9 % (ref 11–15)
GLOBULIN SER CALC-MCNC: 2.7 G/DL (CALC) (ref 1.9–3.7)
GLUCOSE SERPL-MCNC: 96 MG/DL (ref 65–99)
HCT VFR BLD AUTO: 43.3 % (ref 35–45)
HDLC SERPL-MCNC: 50 MG/DL
HGB BLD-MCNC: 14.2 G/DL (ref 11.7–15.5)
LDLC SERPL CALC-MCNC: 148 MG/DL (CALC)
LYMPHOCYTES # BLD AUTO: 1816 CELLS/UL (ref 850–3900)
LYMPHOCYTES NFR BLD AUTO: 27.1 %
MCH RBC QN AUTO: 28.3 PG (ref 27–33)
MCHC RBC AUTO-ENTMCNC: 32.8 G/DL (ref 32–36)
MCV RBC AUTO: 86.3 FL (ref 80–100)
MONOCYTES # BLD AUTO: 583 CELLS/UL (ref 200–950)
MONOCYTES NFR BLD AUTO: 8.7 %
NEUTROPHILS # BLD AUTO: 3966 CELLS/UL (ref 1500–7800)
NEUTROPHILS NFR BLD AUTO: 59.2 %
NONHDLC SERPL-MCNC: 175 MG/DL (CALC)
PLATELET # BLD AUTO: 261 THOUSAND/UL (ref 140–400)
PMV BLD REES-ECKER: 10.8 FL (ref 7.5–12.5)
POTASSIUM SERPL-SCNC: 4.4 MMOL/L (ref 3.5–5.3)
PROT SERPL-MCNC: 6.9 G/DL (ref 6.1–8.1)
RBC # BLD AUTO: 5.02 MILLION/UL (ref 3.8–5.1)
SODIUM SERPL-SCNC: 138 MMOL/L (ref 135–146)
T4 FREE SERPL-MCNC: 1.2 NG/DL (ref 0.8–1.8)
TRIGL SERPL-MCNC: 138 MG/DL
TSH SERPL-ACNC: 3.28 MIU/L
WBC # BLD AUTO: 6.7 THOUSAND/UL (ref 3.8–10.8)

## 2021-08-10 ENCOUNTER — OFFICE VISIT (OUTPATIENT)
Dept: FAMILY MEDICINE | Facility: CLINIC | Age: 36
End: 2021-08-10
Payer: COMMERCIAL

## 2021-08-10 VITALS
SYSTOLIC BLOOD PRESSURE: 124 MMHG | HEART RATE: 64 BPM | BODY MASS INDEX: 33.51 KG/M2 | TEMPERATURE: 97 F | HEIGHT: 61 IN | WEIGHT: 177.5 LBS | DIASTOLIC BLOOD PRESSURE: 80 MMHG | OXYGEN SATURATION: 98 %

## 2021-08-10 DIAGNOSIS — Z00.00 ANNUAL PHYSICAL EXAM: Primary | ICD-10-CM

## 2021-08-10 DIAGNOSIS — R60.0 PERIPHERAL EDEMA: ICD-10-CM

## 2021-08-10 DIAGNOSIS — E03.9 HYPOTHYROIDISM (ACQUIRED): ICD-10-CM

## 2021-08-10 PROCEDURE — 1160F PR REVIEW ALL MEDS BY PRESCRIBER/CLIN PHARMACIST DOCUMENTED: ICD-10-PCS | Mod: CPTII,S$GLB,, | Performed by: INTERNAL MEDICINE

## 2021-08-10 PROCEDURE — 3008F PR BODY MASS INDEX (BMI) DOCUMENTED: ICD-10-PCS | Mod: CPTII,S$GLB,, | Performed by: INTERNAL MEDICINE

## 2021-08-10 PROCEDURE — 1126F AMNT PAIN NOTED NONE PRSNT: CPT | Mod: CPTII,S$GLB,, | Performed by: INTERNAL MEDICINE

## 2021-08-10 PROCEDURE — 1160F RVW MEDS BY RX/DR IN RCRD: CPT | Mod: CPTII,S$GLB,, | Performed by: INTERNAL MEDICINE

## 2021-08-10 PROCEDURE — 99395 PREV VISIT EST AGE 18-39: CPT | Mod: S$GLB,,, | Performed by: INTERNAL MEDICINE

## 2021-08-10 PROCEDURE — 99395 PR PREVENTIVE VISIT,EST,18-39: ICD-10-PCS | Mod: S$GLB,,, | Performed by: INTERNAL MEDICINE

## 2021-08-10 PROCEDURE — 3079F DIAST BP 80-89 MM HG: CPT | Mod: CPTII,S$GLB,, | Performed by: INTERNAL MEDICINE

## 2021-08-10 PROCEDURE — 3008F BODY MASS INDEX DOCD: CPT | Mod: CPTII,S$GLB,, | Performed by: INTERNAL MEDICINE

## 2021-08-10 PROCEDURE — 1159F PR MEDICATION LIST DOCUMENTED IN MEDICAL RECORD: ICD-10-PCS | Mod: CPTII,S$GLB,, | Performed by: INTERNAL MEDICINE

## 2021-08-10 PROCEDURE — 99999 PR PBB SHADOW E&M-EST. PATIENT-LVL III: CPT | Mod: PBBFAC,,, | Performed by: INTERNAL MEDICINE

## 2021-08-10 PROCEDURE — 3074F PR MOST RECENT SYSTOLIC BLOOD PRESSURE < 130 MM HG: ICD-10-PCS | Mod: CPTII,S$GLB,, | Performed by: INTERNAL MEDICINE

## 2021-08-10 PROCEDURE — 3074F SYST BP LT 130 MM HG: CPT | Mod: CPTII,S$GLB,, | Performed by: INTERNAL MEDICINE

## 2021-08-10 PROCEDURE — 1159F MED LIST DOCD IN RCRD: CPT | Mod: CPTII,S$GLB,, | Performed by: INTERNAL MEDICINE

## 2021-08-10 PROCEDURE — 99999 PR PBB SHADOW E&M-EST. PATIENT-LVL III: ICD-10-PCS | Mod: PBBFAC,,, | Performed by: INTERNAL MEDICINE

## 2021-08-10 PROCEDURE — 3079F PR MOST RECENT DIASTOLIC BLOOD PRESSURE 80-89 MM HG: ICD-10-PCS | Mod: CPTII,S$GLB,, | Performed by: INTERNAL MEDICINE

## 2021-08-10 PROCEDURE — 1126F PR PAIN SEVERITY QUANTIFIED, NO PAIN PRESENT: ICD-10-PCS | Mod: CPTII,S$GLB,, | Performed by: INTERNAL MEDICINE

## 2021-08-18 ENCOUNTER — PATIENT MESSAGE (OUTPATIENT)
Dept: FAMILY MEDICINE | Facility: CLINIC | Age: 36
End: 2021-08-18

## 2021-08-20 ENCOUNTER — HOSPITAL ENCOUNTER (EMERGENCY)
Facility: HOSPITAL | Age: 36
Discharge: HOME | End: 2021-08-20
Attending: EMERGENCY MEDICINE
Payer: COMMERCIAL

## 2021-08-20 ENCOUNTER — TELEPHONE (OUTPATIENT)
Dept: FAMILY MEDICINE | Facility: CLINIC | Age: 36
End: 2021-08-20

## 2021-08-20 VITALS
BODY MASS INDEX: 33.25 KG/M2 | OXYGEN SATURATION: 100 % | DIASTOLIC BLOOD PRESSURE: 70 MMHG | SYSTOLIC BLOOD PRESSURE: 137 MMHG | TEMPERATURE: 99 F | HEART RATE: 63 BPM | WEIGHT: 176 LBS | RESPIRATION RATE: 18 BRPM

## 2021-08-20 DIAGNOSIS — U07.1 COVID-19: ICD-10-CM

## 2021-08-20 DIAGNOSIS — R07.9 CHEST PAIN: ICD-10-CM

## 2021-08-20 LAB
B-HCG UR QL: NEGATIVE
CTP QC/QA: YES

## 2021-08-20 PROCEDURE — M0243 CASIRIVI AND IMDEVI INFUSION: HCPCS | Performed by: NURSE PRACTITIONER

## 2021-08-20 PROCEDURE — 63600175 PHARM REV CODE 636 W HCPCS: Performed by: NURSE PRACTITIONER

## 2021-08-20 PROCEDURE — 81025 URINE PREGNANCY TEST: CPT | Performed by: NURSE PRACTITIONER

## 2021-08-20 PROCEDURE — 25000003 PHARM REV CODE 250: Performed by: NURSE PRACTITIONER

## 2021-08-20 PROCEDURE — 99284 EMERGENCY DEPT VISIT MOD MDM: CPT | Mod: 25

## 2021-08-20 PROCEDURE — 93010 ELECTROCARDIOGRAM REPORT: CPT | Mod: ,,, | Performed by: INTERNAL MEDICINE

## 2021-08-20 PROCEDURE — 93005 ELECTROCARDIOGRAM TRACING: CPT

## 2021-08-20 PROCEDURE — 93010 EKG 12-LEAD: ICD-10-PCS | Mod: ,,, | Performed by: INTERNAL MEDICINE

## 2021-08-20 RX ORDER — EPINEPHRINE 0.3 MG/.3ML
0.3 INJECTION SUBCUTANEOUS
Status: DISCONTINUED | OUTPATIENT
Start: 2021-08-20 | End: 2021-08-20 | Stop reason: HOSPADM

## 2021-08-20 RX ORDER — ALBUTEROL SULFATE 90 UG/1
2 AEROSOL, METERED RESPIRATORY (INHALATION)
Status: DISCONTINUED | OUTPATIENT
Start: 2021-08-20 | End: 2021-08-20 | Stop reason: HOSPADM

## 2021-08-20 RX ORDER — ONDANSETRON 4 MG/1
4 TABLET, ORALLY DISINTEGRATING ORAL ONCE AS NEEDED
Status: DISCONTINUED | OUTPATIENT
Start: 2021-08-20 | End: 2021-08-20 | Stop reason: HOSPADM

## 2021-08-20 RX ORDER — DIPHENHYDRAMINE HYDROCHLORIDE 50 MG/ML
25 INJECTION INTRAMUSCULAR; INTRAVENOUS ONCE AS NEEDED
Status: DISCONTINUED | OUTPATIENT
Start: 2021-08-20 | End: 2021-08-20 | Stop reason: HOSPADM

## 2021-08-20 RX ORDER — SODIUM CHLORIDE 0.9 % (FLUSH) 0.9 %
10 SYRINGE (ML) INJECTION
Status: DISCONTINUED | OUTPATIENT
Start: 2021-08-20 | End: 2021-08-20 | Stop reason: HOSPADM

## 2021-08-20 RX ORDER — ACETAMINOPHEN 325 MG/1
650 TABLET ORAL ONCE AS NEEDED
Status: DISCONTINUED | OUTPATIENT
Start: 2021-08-20 | End: 2021-08-20 | Stop reason: HOSPADM

## 2021-08-20 RX ADMIN — CASIRIVIMAB AND IMDEVIMAB 600 MG: 600; 600 INJECTION, SOLUTION, CONCENTRATE INTRAVENOUS at 02:08

## 2021-08-23 ENCOUNTER — PATIENT MESSAGE (OUTPATIENT)
Dept: FAMILY MEDICINE | Facility: CLINIC | Age: 36
End: 2021-08-23

## 2021-08-24 ENCOUNTER — PATIENT MESSAGE (OUTPATIENT)
Dept: FAMILY MEDICINE | Facility: CLINIC | Age: 36
End: 2021-08-24

## 2021-09-23 ENCOUNTER — IMMUNIZATION (OUTPATIENT)
Dept: INTERNAL MEDICINE | Facility: CLINIC | Age: 36
End: 2021-09-23
Payer: COMMERCIAL

## 2021-09-23 DIAGNOSIS — Z23 NEED FOR VACCINATION: Primary | ICD-10-CM

## 2021-09-23 PROCEDURE — 0002A COVID-19, MRNA, LNP-S, PF, 30 MCG/0.3 ML DOSE VACCINE: CPT | Mod: PBBFAC | Performed by: INTERNAL MEDICINE

## 2021-09-23 PROCEDURE — 91300 COVID-19, MRNA, LNP-S, PF, 30 MCG/0.3 ML DOSE VACCINE: CPT | Mod: PBBFAC | Performed by: INTERNAL MEDICINE

## 2021-09-26 ENCOUNTER — PATIENT MESSAGE (OUTPATIENT)
Dept: FAMILY MEDICINE | Facility: CLINIC | Age: 36
End: 2021-09-26

## 2021-09-27 ENCOUNTER — PATIENT MESSAGE (OUTPATIENT)
Dept: FAMILY MEDICINE | Facility: CLINIC | Age: 36
End: 2021-09-27

## 2021-10-27 ENCOUNTER — TELEPHONE (OUTPATIENT)
Dept: FAMILY MEDICINE | Facility: CLINIC | Age: 36
End: 2021-10-27
Payer: COMMERCIAL

## 2021-10-28 ENCOUNTER — OFFICE VISIT (OUTPATIENT)
Dept: FAMILY MEDICINE | Facility: CLINIC | Age: 36
End: 2021-10-28
Payer: COMMERCIAL

## 2021-10-28 DIAGNOSIS — M25.551 BILATERAL HIP PAIN: Primary | ICD-10-CM

## 2021-10-28 DIAGNOSIS — M25.552 BILATERAL HIP PAIN: Primary | ICD-10-CM

## 2021-10-28 PROCEDURE — 1160F RVW MEDS BY RX/DR IN RCRD: CPT | Mod: CPTII,95,, | Performed by: INTERNAL MEDICINE

## 2021-10-28 PROCEDURE — 1159F PR MEDICATION LIST DOCUMENTED IN MEDICAL RECORD: ICD-10-PCS | Mod: CPTII,95,, | Performed by: INTERNAL MEDICINE

## 2021-10-28 PROCEDURE — 1159F MED LIST DOCD IN RCRD: CPT | Mod: CPTII,95,, | Performed by: INTERNAL MEDICINE

## 2021-10-28 PROCEDURE — 99214 PR OFFICE/OUTPT VISIT, EST, LEVL IV, 30-39 MIN: ICD-10-PCS | Mod: 95,,, | Performed by: INTERNAL MEDICINE

## 2021-10-28 PROCEDURE — 99214 OFFICE O/P EST MOD 30 MIN: CPT | Mod: 95,,, | Performed by: INTERNAL MEDICINE

## 2021-10-28 PROCEDURE — 1160F PR REVIEW ALL MEDS BY PRESCRIBER/CLIN PHARMACIST DOCUMENTED: ICD-10-PCS | Mod: CPTII,95,, | Performed by: INTERNAL MEDICINE

## 2021-10-28 RX ORDER — METHYLPREDNISOLONE 4 MG/1
TABLET ORAL
Qty: 21 EACH | Refills: 0 | Status: SHIPPED | OUTPATIENT
Start: 2021-10-28 | End: 2021-11-18

## 2021-12-30 ENCOUNTER — PATIENT MESSAGE (OUTPATIENT)
Dept: INTERNAL MEDICINE | Facility: CLINIC | Age: 36
End: 2021-12-30
Payer: COMMERCIAL

## 2021-12-30 DIAGNOSIS — M25.551 BILATERAL HIP PAIN: Primary | ICD-10-CM

## 2021-12-30 DIAGNOSIS — M25.552 BILATERAL HIP PAIN: Primary | ICD-10-CM

## 2021-12-30 NOTE — TELEPHONE ENCOUNTER
Sports Med Referral on file has  for Dr. Julio Jean Baptiste, in need of a new referral to be placed. Tried to schedule appt thru [facility] but system advised referral was . Pt aware doctor is out of office until Monday

## 2022-01-03 ENCOUNTER — TELEPHONE (OUTPATIENT)
Dept: SPORTS MEDICINE | Facility: CLINIC | Age: 37
End: 2022-01-03
Payer: COMMERCIAL

## 2022-01-03 ENCOUNTER — PATIENT MESSAGE (OUTPATIENT)
Dept: INTERNAL MEDICINE | Facility: CLINIC | Age: 37
End: 2022-01-03
Payer: COMMERCIAL

## 2022-01-03 ENCOUNTER — TELEPHONE (OUTPATIENT)
Dept: INTERNAL MEDICINE | Facility: CLINIC | Age: 37
End: 2022-01-03
Payer: COMMERCIAL

## 2022-01-03 DIAGNOSIS — M25.519 SHOULDER PAIN, UNSPECIFIED CHRONICITY, UNSPECIFIED LATERALITY: Primary | ICD-10-CM

## 2022-01-03 NOTE — TELEPHONE ENCOUNTER
Spoke to the patient about scheduling with the referral that was sent by her PCP but the referral was for the incorrect body part.  Patient will call her PCP back and get them to send over the correct one to us so that we can schedule.  Patient will call us back to get on the books or we will call her when the referral comes through.

## 2022-01-03 NOTE — TELEPHONE ENCOUNTER
----- Message from Bailey Corley sent at 1/3/2022  2:24 PM CST -----  Type:  Patient Requesting Call Back    Who Called:Megha Machado  Would the patient rather a call back or a response via [facility]? Call back  Best Call Back Number:[phone]  Additional Information:  Patient Requesting Call Back regarding referral and states that it says its for her hips, but should be for her shoulders.

## 2022-01-07 ENCOUNTER — LAB VISIT (OUTPATIENT)
Dept: PRIMARY CARE CLINIC | Facility: CLINIC | Age: 37
End: 2022-01-07
Payer: COMMERCIAL

## 2022-01-07 ENCOUNTER — PATIENT MESSAGE (OUTPATIENT)
Dept: ADMINISTRATIVE | Facility: OTHER | Age: 37
End: 2022-01-07
Payer: COMMERCIAL

## 2022-01-07 DIAGNOSIS — Z20.822 CONTACT WITH AND (SUSPECTED) EXPOSURE TO COVID-19: ICD-10-CM

## 2022-01-07 LAB
CTP QC/QA: YES
SARS-COV-2 AG RESP QL IA.RAPID: NEGATIVE

## 2022-01-07 PROCEDURE — 87811 SARS-COV-2 COVID19 W/OPTIC: CPT

## 2022-01-10 ENCOUNTER — LAB VISIT (OUTPATIENT)
Dept: PRIMARY CARE CLINIC | Facility: CLINIC | Age: 37
End: 2022-01-10
Payer: COMMERCIAL

## 2022-01-10 DIAGNOSIS — Z20.822 CONTACT WITH AND (SUSPECTED) EXPOSURE TO COVID-19: ICD-10-CM

## 2022-01-10 LAB
CTP QC/QA: YES
SARS-COV-2 AG RESP QL IA.RAPID: NEGATIVE

## 2022-01-10 PROCEDURE — 87811 SARS-COV-2 COVID19 W/OPTIC: CPT

## 2022-02-07 ENCOUNTER — PATIENT MESSAGE (OUTPATIENT)
Dept: INTERNAL MEDICINE | Facility: CLINIC | Age: 37
End: 2022-02-07
Payer: COMMERCIAL

## 2022-02-07 DIAGNOSIS — E03.9 HYPOTHYROIDISM (ACQUIRED): Primary | ICD-10-CM

## 2022-02-08 ENCOUNTER — PATIENT MESSAGE (OUTPATIENT)
Dept: INTERNAL MEDICINE | Facility: CLINIC | Age: 37
End: 2022-02-08
Payer: COMMERCIAL

## 2022-02-19 LAB
ALBUMIN SERPL-MCNC: 4.2 G/DL (ref 3.6–5.1)
ALBUMIN/GLOB SERPL: 1.7 (CALC) (ref 1–2.5)
ALP SERPL-CCNC: 93 U/L (ref 31–125)
ALT SERPL-CCNC: 15 U/L (ref 6–29)
AST SERPL-CCNC: 16 U/L (ref 10–30)
BILIRUB SERPL-MCNC: 0.5 MG/DL (ref 0.2–1.2)
BUN SERPL-MCNC: 10 MG/DL (ref 7–25)
BUN/CREAT SERPL: NORMAL (CALC) (ref 6–22)
CALCIUM SERPL-MCNC: 8.9 MG/DL (ref 8.6–10.2)
CHLORIDE SERPL-SCNC: 104 MMOL/L (ref 98–110)
CHOLEST SERPL-MCNC: 252 MG/DL
CHOLEST/HDLC SERPL: 5 (CALC)
CO2 SERPL-SCNC: 28 MMOL/L (ref 20–32)
CREAT SERPL-MCNC: 0.9 MG/DL (ref 0.5–1.1)
GLOBULIN SER CALC-MCNC: 2.5 G/DL (CALC) (ref 1.9–3.7)
GLUCOSE SERPL-MCNC: 91 MG/DL (ref 65–99)
HDLC SERPL-MCNC: 50 MG/DL
LDLC SERPL CALC-MCNC: 173 MG/DL (CALC)
NONHDLC SERPL-MCNC: 202 MG/DL (CALC)
POTASSIUM SERPL-SCNC: 4.3 MMOL/L (ref 3.5–5.3)
PROT SERPL-MCNC: 6.7 G/DL (ref 6.1–8.1)
SODIUM SERPL-SCNC: 137 MMOL/L (ref 135–146)
TRIGL SERPL-MCNC: 146 MG/DL

## 2022-02-21 ENCOUNTER — OFFICE VISIT (OUTPATIENT)
Dept: INTERNAL MEDICINE | Facility: CLINIC | Age: 37
End: 2022-02-21
Payer: COMMERCIAL

## 2022-02-21 VITALS
OXYGEN SATURATION: 99 % | HEART RATE: 58 BPM | HEIGHT: 61 IN | DIASTOLIC BLOOD PRESSURE: 70 MMHG | WEIGHT: 171.75 LBS | SYSTOLIC BLOOD PRESSURE: 128 MMHG | BODY MASS INDEX: 32.42 KG/M2

## 2022-02-21 DIAGNOSIS — R60.0 PERIPHERAL EDEMA: ICD-10-CM

## 2022-02-21 DIAGNOSIS — Z00.00 ANNUAL PHYSICAL EXAM: ICD-10-CM

## 2022-02-21 DIAGNOSIS — M25.512 ACUTE PAIN OF BOTH SHOULDERS: Primary | ICD-10-CM

## 2022-02-21 DIAGNOSIS — E03.9 HYPOTHYROIDISM (ACQUIRED): ICD-10-CM

## 2022-02-21 DIAGNOSIS — M25.511 ACUTE PAIN OF BOTH SHOULDERS: Primary | ICD-10-CM

## 2022-02-21 PROCEDURE — 1160F RVW MEDS BY RX/DR IN RCRD: CPT | Mod: CPTII,S$GLB,, | Performed by: INTERNAL MEDICINE

## 2022-02-21 PROCEDURE — 3074F SYST BP LT 130 MM HG: CPT | Mod: CPTII,S$GLB,, | Performed by: INTERNAL MEDICINE

## 2022-02-21 PROCEDURE — 99999 PR PBB SHADOW E&M-EST. PATIENT-LVL III: ICD-10-PCS | Mod: PBBFAC,,, | Performed by: INTERNAL MEDICINE

## 2022-02-21 PROCEDURE — 99999 PR PBB SHADOW E&M-EST. PATIENT-LVL III: CPT | Mod: PBBFAC,,, | Performed by: INTERNAL MEDICINE

## 2022-02-21 PROCEDURE — 1160F PR REVIEW ALL MEDS BY PRESCRIBER/CLIN PHARMACIST DOCUMENTED: ICD-10-PCS | Mod: CPTII,S$GLB,, | Performed by: INTERNAL MEDICINE

## 2022-02-21 PROCEDURE — 3078F PR MOST RECENT DIASTOLIC BLOOD PRESSURE < 80 MM HG: ICD-10-PCS | Mod: CPTII,S$GLB,, | Performed by: INTERNAL MEDICINE

## 2022-02-21 PROCEDURE — 1159F MED LIST DOCD IN RCRD: CPT | Mod: CPTII,S$GLB,, | Performed by: INTERNAL MEDICINE

## 2022-02-21 PROCEDURE — 3078F DIAST BP <80 MM HG: CPT | Mod: CPTII,S$GLB,, | Performed by: INTERNAL MEDICINE

## 2022-02-21 PROCEDURE — 1159F PR MEDICATION LIST DOCUMENTED IN MEDICAL RECORD: ICD-10-PCS | Mod: CPTII,S$GLB,, | Performed by: INTERNAL MEDICINE

## 2022-02-21 PROCEDURE — 3074F PR MOST RECENT SYSTOLIC BLOOD PRESSURE < 130 MM HG: ICD-10-PCS | Mod: CPTII,S$GLB,, | Performed by: INTERNAL MEDICINE

## 2022-02-21 PROCEDURE — 3008F PR BODY MASS INDEX (BMI) DOCUMENTED: ICD-10-PCS | Mod: CPTII,S$GLB,, | Performed by: INTERNAL MEDICINE

## 2022-02-21 PROCEDURE — 3008F BODY MASS INDEX DOCD: CPT | Mod: CPTII,S$GLB,, | Performed by: INTERNAL MEDICINE

## 2022-02-21 PROCEDURE — 99214 PR OFFICE/OUTPT VISIT, EST, LEVL IV, 30-39 MIN: ICD-10-PCS | Mod: S$GLB,,, | Performed by: INTERNAL MEDICINE

## 2022-02-21 PROCEDURE — 99214 OFFICE O/P EST MOD 30 MIN: CPT | Mod: S$GLB,,, | Performed by: INTERNAL MEDICINE

## 2022-02-21 RX ORDER — LEVOTHYROXINE SODIUM 112 UG/1
112 TABLET ORAL
Qty: 90 TABLET | Refills: 3 | Status: SHIPPED | OUTPATIENT
Start: 2022-02-21 | End: 2022-08-25

## 2022-02-21 NOTE — PROGRESS NOTES
[facility] Note    Chief Complaint      Chief Complaint   Patient presents with    Follow-up     6 month f/u       History of Present Illness      Megha Machado is a 36 y.o. female with chronic conditions of hypothyroidism, peripheral edema who presents today for: follow up chronic conditions.  Complains of bilateral shoulder soreness.  Has seen Dr. Jean Baptiste in the past for this.    Hypothyroidism: TSH at goal.  No significant change after increase dose.  Peripheral edema: Controlled on maxzide as needed.  Flu shot declines.  Td declines.  COVID vaccine UTD.  Mammogram due with Dr. Rogel.  PAP smear UTD with DR. Rogel.  Colon cancer screening due age 44     Past Medical History:  History reviewed. No pertinent past medical history.    Past Surgical History:   has a past surgical history that includes  section (06, 08, 16) and Tubal ligation (16).    Family History:  family history is not on file.     Social History:  Social History     Tobacco Use    Smoking status: Never Smoker    Smokeless tobacco: Never Used   Substance Use Topics    Alcohol use: Yes     Alcohol/week: 1.0 standard drink     Types: 1 Glasses of wine per week    Drug use: Never       I personally reviewed all past medical, surgical, social and family history.    Review of Systems   Constitutional: Negative for chills, fever and malaise/fatigue.   Respiratory: Negative for shortness of breath.    Cardiovascular: Negative for chest pain.   Gastrointestinal: Negative for constipation, diarrhea, nausea and vomiting.   Skin: Negative for rash.   Neurological: Negative for weakness.   All other systems reviewed and are negative.       Medications:  Outpatient Encounter Medications as of 2022   Medication Sig Dispense Refill    [DISCONTINUED] levothyroxine (SYNTHROID) 100 MCG tablet Take 1 tablet (100 mcg total) by mouth before breakfast. 90 tablet 3    levothyroxine (SYNTHROID) 112 MCG tablet  "Take 1 tablet (112 mcg total) by mouth before breakfast. 90 tablet 3    meloxicam (MOBIC) 15 MG tablet Take 1 tablet (15 mg total) by mouth once daily. 30 tablet 0    triamterene-hydrochlorothiazide 37.5-25 mg (MAXZIDE-25) 37.5-25 mg per tablet Take 1 tablet by mouth once daily. 30 tablet 11     No facility-administered encounter medications on file as of 2/21/2022.       Allergies:  Review of patient's allergies indicates:   Allergen Reactions    Adhesive      tape    Penicillins        Health Maintenance:  Immunization History   Administered Date(s) Administered    COVID-19, MRNA, LN-S, PF (Pfizer) (Purple Cap) 08/05/2021, 09/23/2021    Influenza 02/12/2020      Health Maintenance   Topic Date Due    TETANUS VACCINE  Never done    Hepatitis C Screening  Completed    Lipid Panel  Completed        Physical Exam      Vital Signs  Pulse: (!) 58  SpO2: 99 %  BP: 128/70  BP Location: Right arm  Patient Position: Sitting  Pain Score: 0-No pain  Height and Weight  Height: 5' 1" (154.9 cm)  Weight: 77.9 kg (171 lb 11.8 oz)  BSA (Calculated - sq m): 1.83 sq meters  BMI (Calculated): 32.5  Weight in (lb) to have BMI = 25: 132]    Physical Exam  Vitals reviewed.   Constitutional:       Appearance: She is well-developed.   HENT:      Head: Normocephalic and atraumatic.      Right Ear: External ear normal.      Left Ear: External ear normal.   Cardiovascular:      Rate and Rhythm: Normal rate and regular rhythm.      Heart sounds: Normal heart sounds. No murmur heard.  Pulmonary:      Effort: Pulmonary effort is normal.      Breath sounds: Normal breath sounds. No wheezing or rales.   Abdominal:      General: Bowel sounds are normal. There is no distension.      Palpations: Abdomen is soft.      Tenderness: There is no abdominal tenderness.          Laboratory:  CBC:  Recent Labs   Lab 06/01/20  0818 08/05/21  0739   WBC 5.5 6.7   RBC 4.73 5.02   Hemoglobin 13.6 14.2   Hematocrit 41.8 43.3   Platelets 259 261   MCV " 88.4 86.3   MCH 28.8 28.3   MCHC 32.5 32.8     CMP:  Recent Labs   Lab 06/01/20  0818 02/04/21  0717 08/05/21  0739 02/18/22  0805   Glucose 92 105 H 96 91   Calcium 8.7 9.1 9.2 8.9   Albumin 4.0 4.3 4.2 4.2   Total Protein 6.5 6.8 6.9 6.7   Sodium 137 141 138 137   Potassium 4.5 4.6 4.4 4.3   CO2 28 27 27 28   Chloride 104 103 103 104   BUN 16 14 11 10   Alkaline Phosphatase 94  --   --   --    ALT 23 16 11 15   AST 20 17 13 16   Total Bilirubin 0.4 0.4 0.4 0.5     URINALYSIS:       LIPIDS:  Recent Labs   Lab 06/01/20 0818 07/31/20 0758 02/04/21 0717 08/05/21  0739 02/18/22  0805   TSH 4.82 H 3.29 3.58 3.28  --    HDL 54  --   --  50 50   Cholesterol 220 H  --   --  225 H 252 H   Triglycerides 74  --   --  138 146   LDL Cholesterol 148 H  --   --  148 H 173 H   HDL/Cholesterol Ratio 4.1  --   --  4.5 5.0 H   Non HDL Chol. (LDL+VLDL) 166 H  --   --  175 H 202 H     TSH:  Recent Labs   Lab 07/31/20 0758 02/04/21 0717 08/05/21  0739   TSH 3.29 3.58 3.28     A1C:        Assessment/Plan     Megha Machado is a 36 y.o.female with:    1. Acute pain of both shoulders  - Ambulatory referral/consult to Sports Medicine; Future  Referring back to sports medicine.  2. Hypothyroidism (acquired)  - TSH; Future  - T4, Free; Future  - levothyroxine (SYNTHROID) 112 MCG tablet; Take 1 tablet (112 mcg total) by mouth before breakfast.  Dispense: 90 tablet; Refill: 3  Increasing synthroid.    3. Peripheral edema  Continue current meds as needed  4. Annual physical exam  - Comprehensive Metabolic Panel; Future  - Lipid Panel; Future  - TSH; Future  - T4, Free; Future  - CBC Auto Differential; Future       Chronic conditions status updated as per HPI.  Other than changes above, cont current medications and maintain follow up with specialists.  Follow up in about 6 months (around 8/21/2022) for Annual preventative visit.    No future appointments.    Julio Parker MD  [facility]

## 2022-03-15 ENCOUNTER — HOSPITAL ENCOUNTER (OUTPATIENT)
Dept: RADIOLOGY | Facility: HOSPITAL | Age: 37
Discharge: HOME | End: 2022-03-15
Attending: ORTHOPAEDIC SURGERY
Payer: COMMERCIAL

## 2022-03-15 ENCOUNTER — OFFICE VISIT (OUTPATIENT)
Dept: SPORTS MEDICINE | Facility: CLINIC | Age: 37
End: 2022-03-15
Payer: COMMERCIAL

## 2022-03-15 VITALS
BODY MASS INDEX: 33.04 KG/M2 | HEART RATE: 63 BPM | SYSTOLIC BLOOD PRESSURE: 129 MMHG | WEIGHT: 175 LBS | DIASTOLIC BLOOD PRESSURE: 73 MMHG | HEIGHT: 61 IN

## 2022-03-15 DIAGNOSIS — M99.02 SOMATIC DYSFUNCTION OF THORACIC REGION: ICD-10-CM

## 2022-03-15 DIAGNOSIS — M99.07 SOMATIC DYSFUNCTION OF UPPER EXTREMITY: ICD-10-CM

## 2022-03-15 DIAGNOSIS — M99.08 SOMATIC DYSFUNCTION OF RIB CAGE REGION: ICD-10-CM

## 2022-03-15 DIAGNOSIS — M25.512 ACUTE PAIN OF BOTH SHOULDERS: Primary | ICD-10-CM

## 2022-03-15 DIAGNOSIS — M25.511 ACUTE PAIN OF BOTH SHOULDERS: Primary | ICD-10-CM

## 2022-03-15 DIAGNOSIS — M99.01 SOMATIC DYSFUNCTION OF CERVICAL REGION: ICD-10-CM

## 2022-03-15 DIAGNOSIS — M25.511 ACUTE PAIN OF BOTH SHOULDERS: ICD-10-CM

## 2022-03-15 DIAGNOSIS — M25.512 ACUTE PAIN OF BOTH SHOULDERS: ICD-10-CM

## 2022-03-15 PROCEDURE — 3008F BODY MASS INDEX DOCD: CPT | Mod: CPTII,S$GLB,, | Performed by: ORTHOPAEDIC SURGERY

## 2022-03-15 PROCEDURE — 3074F PR MOST RECENT SYSTOLIC BLOOD PRESSURE < 130 MM HG: ICD-10-PCS | Mod: CPTII,S$GLB,, | Performed by: ORTHOPAEDIC SURGERY

## 2022-03-15 PROCEDURE — 98926 PR OSTEOPATHIC MANIP,3-4 BODY REGN: ICD-10-PCS | Mod: S$GLB,,, | Performed by: ORTHOPAEDIC SURGERY

## 2022-03-15 PROCEDURE — 3008F PR BODY MASS INDEX (BMI) DOCUMENTED: ICD-10-PCS | Mod: CPTII,S$GLB,, | Performed by: ORTHOPAEDIC SURGERY

## 2022-03-15 PROCEDURE — 1160F PR REVIEW ALL MEDS BY PRESCRIBER/CLIN PHARMACIST DOCUMENTED: ICD-10-PCS | Mod: CPTII,S$GLB,, | Performed by: ORTHOPAEDIC SURGERY

## 2022-03-15 PROCEDURE — 1160F RVW MEDS BY RX/DR IN RCRD: CPT | Mod: CPTII,S$GLB,, | Performed by: ORTHOPAEDIC SURGERY

## 2022-03-15 PROCEDURE — 99999 PR PBB SHADOW E&M-EST. PATIENT-LVL III: CPT | Mod: PBBFAC,,, | Performed by: ORTHOPAEDIC SURGERY

## 2022-03-15 PROCEDURE — 99214 OFFICE O/P EST MOD 30 MIN: CPT | Mod: 25,S$GLB,, | Performed by: ORTHOPAEDIC SURGERY

## 2022-03-15 PROCEDURE — 98926 OSTEOPATH MANJ 3-4 REGIONS: CPT | Mod: S$GLB,,, | Performed by: ORTHOPAEDIC SURGERY

## 2022-03-15 PROCEDURE — 99999 PR PBB SHADOW E&M-EST. PATIENT-LVL III: ICD-10-PCS | Mod: PBBFAC,,, | Performed by: ORTHOPAEDIC SURGERY

## 2022-03-15 PROCEDURE — 99214 PR OFFICE/OUTPT VISIT, EST, LEVL IV, 30-39 MIN: ICD-10-PCS | Mod: 25,S$GLB,, | Performed by: ORTHOPAEDIC SURGERY

## 2022-03-15 PROCEDURE — 1159F PR MEDICATION LIST DOCUMENTED IN MEDICAL RECORD: ICD-10-PCS | Mod: CPTII,S$GLB,, | Performed by: ORTHOPAEDIC SURGERY

## 2022-03-15 PROCEDURE — 3078F DIAST BP <80 MM HG: CPT | Mod: CPTII,S$GLB,, | Performed by: ORTHOPAEDIC SURGERY

## 2022-03-15 PROCEDURE — 3074F SYST BP LT 130 MM HG: CPT | Mod: CPTII,S$GLB,, | Performed by: ORTHOPAEDIC SURGERY

## 2022-03-15 PROCEDURE — 1159F MED LIST DOCD IN RCRD: CPT | Mod: CPTII,S$GLB,, | Performed by: ORTHOPAEDIC SURGERY

## 2022-03-15 PROCEDURE — 3078F PR MOST RECENT DIASTOLIC BLOOD PRESSURE < 80 MM HG: ICD-10-PCS | Mod: CPTII,S$GLB,, | Performed by: ORTHOPAEDIC SURGERY

## 2022-03-15 RX ORDER — MELOXICAM 15 MG/1
15 TABLET ORAL DAILY
Qty: 30 TABLET | Refills: 0 | Status: SHIPPED | OUTPATIENT
Start: 2022-03-15 | End: 2022-04-25

## 2022-03-15 NOTE — PROGRESS NOTES
CC: right shoulder pain    Megha is here today for follow up evaluation of her right shoulder pain. Patient reports her pain is 3/10 today. She appreciated great improvement in her pain following OMT at her initial visit. She has continued with her HEP and shoulder stretching on occasion. 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 When asked where she hurts she gestures to the trapezius muscle bilaterally. She denies new falls or trauma since her last visit.     Recall from visit on 02/18/2020  36 y.o. Female presents today for evaluation of her right shoulder pain. Patient is unable to recall a specific mechanism of shoulder injury. She reports she feels as though he pain may be spurring from the way she sleeps in a crowded bed with her , dog and three year old son. Patient states she fractured her clavicle as a child while jumping on the bed, and otherwise denies prior history of shoulder injury. When asked where her pain is located she gestures to the superior aspect of her shoulder and the right side of her neck. She describes the quality of her pain as sharp, shooting and throbbing. Patient denies this problem waking her up from her sleep. Denies recent falls or trauma.   How long: Patient reports she has been experiencing right shoulder pain for approximately one month.   What makes it better: Patient admits to her pain improving with rest.   What makes it worse: Patient admits to increased pain with lifting her arm overhead, shrugging her shoulders, and doing repetitive cleaning motions while at work.   Does it radiate: Patient admits to her pain radiating from the superior aspect of her shoulder to the right side of her neck.  Attempted treatments: Patient admits to taking ibuprofen 800 mg as needed. She also states she has tried taking Naprosyn for this problem. She denies prior history of  injection or surgery to eiteher shoulder. Denies formal physical therapy. Denies the use of ice or heat.   Pain score: 0/10  Any mechanical symptoms: Denies mechanical symptoms.  Feelings of instability: Denies feelings of instability.   Affecting ADLs: Patient admits to this problem affecting her ability to perform ADLs. 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     REVIEW OF SYSTEMS:   Constitution: Patient denies fever, chills, night sweats, and weight changes.  Eyes: Patient denies eye pain or vision change.  HENT: Patient denies any headache, ear pain, sore throat, or nasal discharge.  CVS: Patient denies chest pain.  Lungs: Patient denies any shortness of breath or cough.  Abd: Patient denies any stomach pain, nausea, or vomiting.  Skin: Patient denies any skin rash or itching.    Hematologic/Lymphatic: Patient denies any bleeding problems, or easy bruising.   Musculoskeletal: Patient denies any recent falls. See HPI.  Psych: Patient denies any current anxiety or nervousness.    PAST MEDICAL HISTORY:   History reviewed. No pertinent past medical history.    PAST SURGICAL HISTORY:   Past Surgical History:   Procedure Laterality Date     SECTION  06, 08, 16    TUBAL LIGATION  16       FAMILY HISTORY:   History reviewed. No pertinent family history.    SOCIAL HISTORY:   Social History     Socioeconomic History    Marital status:    Tobacco Use    Smoking status: Never Smoker    Smokeless tobacco: Never Used   Substance and Sexual Activity    Alcohol use: Yes     Alcohol/week: 1.0 standard drink     Types: 1 Glasses of wine per week    Drug use: Never    Sexual activity: Yes     Partners: Male     Birth control/protection: None     Social Determinants of Health     Financial Resource Strain: Low Risk     Difficulty of Paying Living Expenses: Not hard at all   Food Insecurity: No Food Insecurity    Worried  "About Running Out of Food in the Last Year: Never true    Ran Out of Food in the Last Year: Never true   Transportation Needs: No Transportation Needs    Lack of Transportation (Medical): No    Lack of Transportation (Non-Medical): No   Physical Activity: Insufficiently Active    Days of Exercise per Week: 2 days    Minutes of Exercise per Session: 20 min   Stress: No Stress Concern Present    Feeling of Stress : Only a little   Social Connections: Unknown    Frequency of Communication with Friends and Family: More than three times a week    Frequency of Social Gatherings with Friends and Family: Once a week    Active Member of Clubs or Organizations: No    Attends Club or Organization Meetings: Never    Marital Status:    Housing Stability: Unknown    Unable to Pay for Housing in the Last Year: No    Unstable Housing in the Last Year: No       MEDICATIONS:     Current Outpatient Medications:     levothyroxine (SYNTHROID) 112 MCG tablet, Take 1 tablet (112 mcg total) by mouth before breakfast., Disp: 90 tablet, Rfl: 3    meloxicam (MOBIC) 15 MG tablet, Take 1 tablet (15 mg total) by mouth once daily., Disp: 30 tablet, Rfl: 0    triamterene-hydrochlorothiazide 37.5-25 mg (MAXZIDE-25) 37.5-25 mg per tablet, Take 1 tablet by mouth once daily., Disp: 30 tablet, Rfl: 11    ALLERGIES:   Review of patient's allergies indicates:   Allergen Reactions    Adhesive      tape    Penicillins         PHYSICAL EXAMINATION:  /73   Pulse 63   Ht 5' 1" (1.549 m)   Wt 79.4 kg (175 lb)   BMI 33.07 kg/m²   Vitals signs and nursing note have been reviewed.  General: In no acute distress, well developed, well nourished, no diaphoresis  Eyes: EOM full and smooth, no eye redness or discharge  HENT: normocephalic and atraumatic, neck supple, trachea midline, no nasal discharge, no external ear redness or discharge  Cardiovascular: 2+ and symmetric radial bilaterally, no LE edema  Lungs: respirations " non-labored, no conversational dyspnea   Abd: non-distended, no rigidity  MSK: no amputation or deformity, no swelling of extremities  Neuro: AAOx3, CN2-12 grossly intact  Skin: No rashes, warm and dry  Psychiatric: cooperative, pleasant, mood and affect appropriate for age    SHOULDER: RIGHT  The affected shoulder is compared to the contralateral shoulder.    Observation:    CERVICAL SPINE  Normal head carriage. Normal thoracic kyphosis.  Full AROM in flexion, extension, sidebending, and rotation.    SHOULDER  No ecchymosis, edema, or erythema throughout the shoulder girdle.  No sternal, clavicular, or acromial deformities bilaterally.  No atrophy of the pectorals, deltoids, supraspinatus, infraspinatus, or biceps bilaterally.  No asymmetry of shoulders bilaterally.    ROM:  Active flexion to 180° on left and 180° on right.   Active abduction to 180° on left and 180° on right.    Active internal rotation to T7 on left and T7 on right.    Active external rotation to T4 on left and T4 on right.    Scapular dyskinesia on the right    Tenderness:  No tenderness at the SC or AC joint  No tenderness over the clavicle   No tenderness over biceps tendon or bicipital groove  No tenderness over subacromial space  + tenderness along the trapezius muscle on the right    Strength Testing:  Deltoid - 5/5 on left and 5/5 on right  Biceps - 5/5 on left and 5/5 on right  Triceps - 5/5 on left and 5/5 on right  Wrist extension - 5/5 on left and 5/5 on right  Wrist flexion - 5/5 on left and 5/5 on right   - 5/5 on left and 5/5 on right  Finger extension - 5/5 on left and 5/5 on right  Finger abduction - 5/5 on left and 5/5 on right    Special Tests:  Empty can test - negative  Full can test - negative  Bear hug test - negative  Belly press test - negative  Resisted internal rotation - negative  Resisted external rotation - negative    Neer's test - negative  Hawkin's-Sukhjinder test - positive    ORonnis test - negative    Speed's  test - negative  Yergason's test - negative    Sulcus sign - none  AP load and shift laxity - none    Posture:  Upright, Increased thoracic kyphosis and Anterior head carriage  Gait: Non-antalgic with Neutral ankle mechanics    TART (Tissue texture abnormality, Asymmetry,  Restriction of motion and/or Tenderness) changes:       Cervical Spine  Thoracic Spine  Lumbar Spine   C1 TTAB T1 TTAR L1    C2 TTAB T2 TTAR L2    C3 TTAB T3 NSRRL L3    C4 TTAB T4 NSRRL L4    C5 TTAB T5 NSRRL L5    C6 TTAB T6 Neutral     C7 TTAB T7 ERSR       T8 FRSR       T9 Neutral       T10 Neutral       T11 Neutral       T12 Neutral       Ribs:  Superior rib 1 on the right  Myofascial restriction bilateral upper ribcage    Upper extremity:  Myofascial restriction of the right trapezius muscle and right periscapular musculature  Fascial herniated trigger points along the superior medial aspect of the right scapula  Pectoralis major tightness bilaterally      Key   F= Flexed   E = Extended   R = Rotated   S = Sidebent   TTA = tissue texture abnormality       Neurovascular Exam:  2+ radial pulses BL  Sensation intact to light touch in the distal median, radial, and ulnar nerve distributions bilaterally.  Spurlings test - negative  Lhermittes test - negative  Capillary refill intact <2 seconds in all digits bilaterally      IMAGIN. X-ray obtained 2020 due to right shoulder pain   2. X-ray images were reviewed personally by me and then directly with patient.  3. FINDINGS: X-ray images obtained demonstrate the alignment is within normal limits, there is no fracture and no marrow replacement process.  4. IMPRESSION: No pathology or irregularities appreciated.       ASSESSMENT:      ICD-10-CM ICD-9-CM   1. Acute pain of both shoulders  M25.511 719.41    M25.512    2. Somatic dysfunction of cervical region  M99.01 739.1   3. Somatic dysfunction of thoracic region  M99.02 739.2   4. Somatic dysfunction of rib cage region  M99.08 739.8   5.  Somatic dysfunction of upper extremity  M99.07 739.7       PLAN:  1. Bilateral shoulder pain - improved, then deteriorated    - Megha is here for follow-up on her shoulder pain.  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      - She appreciated great improvement in her pain following OMT at her initial visit 02/18/2020. She has appreciated increased pain over the past two months which she attributes to increased time spent driving.     - Based on her body language/description of pain and somatic dysfunction identified on exam, I discussed osteopathic manipulation as a treatment option today. She consents to evaluation and treatment. See below.     - Continue with HEP for scapular stabilization, snow angels, large arm circles, and Tristanian prescribed at initial visit.  Added in doorway stretches for pec tightness.    - Mobic 15mg daily for 2 weeks followed by as needed. Prescription refilled today.      2-5.  Somatic dysfunction of upper extremity, ribcage, thoracic, cervical regions -     - OMT 3-4 regions. Oral consent obtained. Reviewed benefits and potential side effects. OMT indicated today due to signs and symptoms as well as local and remote somatic dysfunction findings and their related neurokinetic, lymphatic, fascial and/or arteriovenous body connections. OMT techniques used: Soft Tissue, Myofascial Release, Muscle Energy and Fascial Distortion Model. Treatment was tolerated well. Improvement noted in segmental mobility post-treatment in dysfunctional regions. There were no adverse events and no complications immediately following treatment. Advised plenty of water to help alleviate soreness.      Future planning includes - Possibly more OMT if helpful and if indicated    All questions were answered to the best of my ability and all concerns were addressed at this time.    Follow up for above as needed.      This note is  dictated using the M*Modal Fluency Direct word recognition program. There are word recognition mistakes that are occasionally missed on review.      Total time spent with patient: see X rafal on chart below.   More than half of the time was spent counseling or coordinating care including prognosis, differential diagnosis, risks and benefits of treatment, instructions, compliance risk reductions.    EST MINUTES X   [id number] 5    72625 10    07906 15    [id number] 25 X   [id number] 40    NEW     [id number] 10    71176 20    72836 30    54464 45    62154 60    PHONE      5-10    70369 11-20    82126 21-30

## 2022-07-05 ENCOUNTER — PATIENT MESSAGE (OUTPATIENT)
Dept: INTERNAL MEDICINE | Facility: CLINIC | Age: 37
End: 2022-07-05
Payer: COMMERCIAL

## 2022-07-12 ENCOUNTER — PATIENT MESSAGE (OUTPATIENT)
Dept: INTERNAL MEDICINE | Facility: CLINIC | Age: 37
End: 2022-07-12
Payer: COMMERCIAL

## 2022-07-15 ENCOUNTER — PATIENT MESSAGE (OUTPATIENT)
Dept: INTERNAL MEDICINE | Facility: CLINIC | Age: 37
End: 2022-07-15
Payer: COMMERCIAL

## 2022-08-16 LAB
ALBUMIN SERPL-MCNC: 4.2 G/DL (ref 3.6–5.1)
ALBUMIN/GLOB SERPL: 1.5 (CALC) (ref 1–2.5)
ALP SERPL-CCNC: 88 U/L (ref 31–125)
ALT SERPL-CCNC: 14 U/L (ref 6–29)
AST SERPL-CCNC: 17 U/L (ref 10–30)
BASOPHILS # BLD AUTO: 72 CELLS/UL (ref 0–200)
BASOPHILS NFR BLD AUTO: 1.2 %
BILIRUB SERPL-MCNC: 0.5 MG/DL (ref 0.2–1.2)
BUN SERPL-MCNC: 14 MG/DL (ref 7–25)
BUN/CREAT SERPL: ABNORMAL (CALC) (ref 6–22)
CALCIUM SERPL-MCNC: 9 MG/DL (ref 8.6–10.2)
CHLORIDE SERPL-SCNC: 106 MMOL/L (ref 98–110)
CHOLEST SERPL-MCNC: 239 MG/DL
CHOLEST/HDLC SERPL: 4.6 (CALC)
CO2 SERPL-SCNC: 29 MMOL/L (ref 20–32)
CREAT SERPL-MCNC: 0.84 MG/DL (ref 0.5–0.97)
EGFR: 92 ML/MIN/1.73M2
EOSINOPHIL # BLD AUTO: 210 CELLS/UL (ref 15–500)
EOSINOPHIL NFR BLD AUTO: 3.5 %
ERYTHROCYTE [DISTWIDTH] IN BLOOD BY AUTOMATED COUNT: 13.1 % (ref 11–15)
GLOBULIN SER CALC-MCNC: 2.8 G/DL (CALC) (ref 1.9–3.7)
GLUCOSE SERPL-MCNC: 100 MG/DL (ref 65–99)
HCT VFR BLD AUTO: 42.6 % (ref 35–45)
HDLC SERPL-MCNC: 52 MG/DL
HGB BLD-MCNC: 14 G/DL (ref 11.7–15.5)
LDLC SERPL CALC-MCNC: 163 MG/DL (CALC)
LYMPHOCYTES # BLD AUTO: 1596 CELLS/UL (ref 850–3900)
LYMPHOCYTES NFR BLD AUTO: 26.6 %
MCH RBC QN AUTO: 29 PG (ref 27–33)
MCHC RBC AUTO-ENTMCNC: 32.9 G/DL (ref 32–36)
MCV RBC AUTO: 88.4 FL (ref 80–100)
MONOCYTES # BLD AUTO: 552 CELLS/UL (ref 200–950)
MONOCYTES NFR BLD AUTO: 9.2 %
NEUTROPHILS # BLD AUTO: 3570 CELLS/UL (ref 1500–7800)
NEUTROPHILS NFR BLD AUTO: 59.5 %
NONHDLC SERPL-MCNC: 187 MG/DL (CALC)
PLATELET # BLD AUTO: 246 THOUSAND/UL (ref 140–400)
PMV BLD REES-ECKER: 10.4 FL (ref 7.5–12.5)
POTASSIUM SERPL-SCNC: 4.4 MMOL/L (ref 3.5–5.3)
PROT SERPL-MCNC: 7 G/DL (ref 6.1–8.1)
RBC # BLD AUTO: 4.82 MILLION/UL (ref 3.8–5.1)
SODIUM SERPL-SCNC: 140 MMOL/L (ref 135–146)
T4 FREE SERPL-MCNC: 1 NG/DL (ref 0.8–1.8)
TRIGL SERPL-MCNC: 121 MG/DL
TSH SERPL-ACNC: 21.81 MIU/L
WBC # BLD AUTO: 6 THOUSAND/UL (ref 3.8–10.8)

## 2022-08-23 ENCOUNTER — OFFICE VISIT (OUTPATIENT)
Dept: INTERNAL MEDICINE | Facility: CLINIC | Age: 37
End: 2022-08-23
Payer: COMMERCIAL

## 2022-08-23 ENCOUNTER — LAB VISIT (OUTPATIENT)
Dept: LAB | Facility: HOSPITAL | Age: 37
End: 2022-08-23
Attending: INTERNAL MEDICINE
Payer: COMMERCIAL

## 2022-08-23 VITALS
WEIGHT: 179.13 LBS | BODY MASS INDEX: 33.82 KG/M2 | DIASTOLIC BLOOD PRESSURE: 80 MMHG | HEART RATE: 59 BPM | OXYGEN SATURATION: 98 % | SYSTOLIC BLOOD PRESSURE: 110 MMHG | HEIGHT: 61 IN

## 2022-08-23 DIAGNOSIS — E03.9 HYPOTHYROIDISM (ACQUIRED): ICD-10-CM

## 2022-08-23 DIAGNOSIS — Z00.00 ANNUAL PHYSICAL EXAM: Primary | ICD-10-CM

## 2022-08-23 DIAGNOSIS — E66.9 OBESITY, UNSPECIFIED CLASSIFICATION, UNSPECIFIED OBESITY TYPE, UNSPECIFIED WHETHER SERIOUS COMORBIDITY PRESENT: ICD-10-CM

## 2022-08-23 DIAGNOSIS — E88.810 METABOLIC SYNDROME: ICD-10-CM

## 2022-08-23 LAB
T4 FREE SERPL-MCNC: 0.82 NG/DL (ref 0.71–1.51)
TSH SERPL DL<=0.005 MIU/L-ACNC: 6.3 UIU/ML (ref 0.4–4)

## 2022-08-23 PROCEDURE — 3008F BODY MASS INDEX DOCD: CPT | Mod: CPTII,S$GLB,, | Performed by: INTERNAL MEDICINE

## 2022-08-23 PROCEDURE — 3074F PR MOST RECENT SYSTOLIC BLOOD PRESSURE < 130 MM HG: ICD-10-PCS | Mod: CPTII,S$GLB,, | Performed by: INTERNAL MEDICINE

## 2022-08-23 PROCEDURE — 84443 ASSAY THYROID STIM HORMONE: CPT | Performed by: INTERNAL MEDICINE

## 2022-08-23 PROCEDURE — 99395 PR PREVENTIVE VISIT,EST,18-39: ICD-10-PCS | Mod: S$GLB,,, | Performed by: INTERNAL MEDICINE

## 2022-08-23 PROCEDURE — 1159F MED LIST DOCD IN RCRD: CPT | Mod: CPTII,S$GLB,, | Performed by: INTERNAL MEDICINE

## 2022-08-23 PROCEDURE — 3079F PR MOST RECENT DIASTOLIC BLOOD PRESSURE 80-89 MM HG: ICD-10-PCS | Mod: CPTII,S$GLB,, | Performed by: INTERNAL MEDICINE

## 2022-08-23 PROCEDURE — 84439 ASSAY OF FREE THYROXINE: CPT | Performed by: INTERNAL MEDICINE

## 2022-08-23 PROCEDURE — 3074F SYST BP LT 130 MM HG: CPT | Mod: CPTII,S$GLB,, | Performed by: INTERNAL MEDICINE

## 2022-08-23 PROCEDURE — 3079F DIAST BP 80-89 MM HG: CPT | Mod: CPTII,S$GLB,, | Performed by: INTERNAL MEDICINE

## 2022-08-23 PROCEDURE — 99395 PREV VISIT EST AGE 18-39: CPT | Mod: S$GLB,,, | Performed by: INTERNAL MEDICINE

## 2022-08-23 PROCEDURE — 3008F PR BODY MASS INDEX (BMI) DOCUMENTED: ICD-10-PCS | Mod: CPTII,S$GLB,, | Performed by: INTERNAL MEDICINE

## 2022-08-23 PROCEDURE — 99999 PR PBB SHADOW E&M-EST. PATIENT-LVL III: ICD-10-PCS | Mod: PBBFAC,,, | Performed by: INTERNAL MEDICINE

## 2022-08-23 PROCEDURE — 99999 PR PBB SHADOW E&M-EST. PATIENT-LVL III: CPT | Mod: PBBFAC,,, | Performed by: INTERNAL MEDICINE

## 2022-08-23 PROCEDURE — 1159F PR MEDICATION LIST DOCUMENTED IN MEDICAL RECORD: ICD-10-PCS | Mod: CPTII,S$GLB,, | Performed by: INTERNAL MEDICINE

## 2022-08-23 PROCEDURE — 36415 COLL VENOUS BLD VENIPUNCTURE: CPT | Performed by: INTERNAL MEDICINE

## 2022-08-23 RX ORDER — TIRZEPATIDE 5 MG/.5ML
5 INJECTION, SOLUTION SUBCUTANEOUS
Qty: 4 PEN | Refills: 3 | Status: SHIPPED | OUTPATIENT
Start: 2022-08-23 | End: 2023-03-03 | Stop reason: ALTCHOICE

## 2022-08-23 RX ORDER — TIRZEPATIDE 2.5 MG/.5ML
2.5 INJECTION, SOLUTION SUBCUTANEOUS
Qty: 4 PEN | Refills: 0 | Status: SHIPPED | OUTPATIENT
Start: 2022-08-23 | End: 2023-03-03 | Stop reason: ALTCHOICE

## 2022-08-23 NOTE — PROGRESS NOTES
[facility] Note    Chief Complaint      Chief Complaint   Patient presents with    Annual Exam       History of Present Illness      Megha Machado is a 36 y.o. female with chronic conditions of hypothyroidism, peripheral edema who presents today for: annual preventative visit.  Pt has been trying to lose weight.  Exercising regularly.  Dieting with Golo program.  Hypothyroidism: TSH significantly high.  Will recheck to confirm.  Free T4 normal.  Peripheral edema: Controlled on maxzide as needed.  Diet: Prepares own food mostly.  Limiting fatty foods and carbs.  Drinks plenty water  Exercise: See above.    Denies drinking and driving, drinking more than 4 drinks on occasion, drug use.     Flu shot declines.  Td declines.  COVID vaccine UTD.  Mammogram with Dr. Rogel.  PAP smear UTD with DR. Rogel.  Colon cancer screening due age 44    Past Medical History:  History reviewed. No pertinent past medical history.    Past Surgical History:   has a past surgical history that includes  section (06, 08, 16) and Tubal ligation (16).    Family History:  family history is not on file.     Social History:  Social History     Tobacco Use    Smoking status: Never Smoker    Smokeless tobacco: Never Used   Substance Use Topics    Alcohol use: Yes     Alcohol/week: 1.0 standard drink     Types: 1 Glasses of wine per week    Drug use: Never       I personally reviewed all past medical, surgical, social and family history.    Review of Systems   Constitutional: Negative for chills, fever and malaise/fatigue.   Respiratory: Negative for shortness of breath.    Cardiovascular: Negative for chest pain.   Gastrointestinal: Negative for constipation, diarrhea, nausea and vomiting.   Skin: Negative for rash.   Neurological: Negative for weakness.   All other systems reviewed and are negative.       Medications:  Outpatient Encounter Medications as of 2022   Medication Sig Dispense  "Refill    levothyroxine (SYNTHROID) 112 MCG tablet Take 1 tablet (112 mcg total) by mouth before breakfast. 90 tablet 3    meloxicam (MOBIC) 15 MG tablet TAKE 1 TABLET(15 MG) BY MOUTH EVERY DAY 30 tablet 0    tirzepatide (MOUNJARO) 2.5 mg/0.5 mL PnIj Inject 2.5 mg into the skin every 7 days. Starting month 4 pen 0    tirzepatide (MOUNJARO) 5 mg/0.5 mL PnIj Inject 5 mg into the skin every 7 days. Continuing month dose 4 pen 3    triamterene-hydrochlorothiazide 37.5-25 mg (MAXZIDE-25) 37.5-25 mg per tablet Take 1 tablet by mouth once daily. 30 tablet 11     No facility-administered encounter medications on file as of 8/23/2022.       Allergies:  Review of patient's allergies indicates:   Allergen Reactions    Adhesive      tape    Penicillins        Health Maintenance:  Immunization History   Administered Date(s) Administered    COVID-19, MRNA, LN-S, PF (Pfizer) (Purple Cap) 08/05/2021, 09/23/2021    Influenza 02/12/2020      Health Maintenance   Topic Date Due    TETANUS VACCINE  Never done    Hepatitis C Screening  Completed    Lipid Panel  Completed        Physical Exam      Vital Signs  Pulse: (!) 59  SpO2: 98 %  BP: 110/80  BP Location: Left arm  Patient Position: Sitting  Pain Score: 0-No pain  Height and Weight  Height: 5' 1" (154.9 cm)  Weight: 81.3 kg (179 lb 2 oz)  BSA (Calculated - sq m): 1.87 sq meters  BMI (Calculated): 33.9  Weight in (lb) to have BMI = 25: 132]    Physical Exam  Vitals reviewed.   Constitutional:       Appearance: She is well-developed.   HENT:      Head: Normocephalic and atraumatic.      Right Ear: External ear normal.      Left Ear: External ear normal.   Cardiovascular:      Rate and Rhythm: Normal rate and regular rhythm.      Heart sounds: Normal heart sounds. No murmur heard.  Pulmonary:      Effort: Pulmonary effort is normal.      Breath sounds: Normal breath sounds. No wheezing or rales.   Abdominal:      General: Bowel sounds are normal. There is no distension.    "   Palpations: Abdomen is soft.      Tenderness: There is no abdominal tenderness.          Laboratory:  CBC:  Recent Labs   Lab 06/01/20  0818 08/05/21  0739 08/15/22  0739   WBC 5.5 6.7 6.0   RBC 4.73 5.02 4.82   Hemoglobin 13.6 14.2 14.0   Hematocrit 41.8 43.3 42.6   Platelets 259 261 246   MCV 88.4 86.3 88.4   MCH 28.8 28.3 29.0   MCHC 32.5 32.8 32.9     CMP:  Recent Labs   Lab 06/01/20  0818 02/04/21  0717 08/05/21  0739 02/18/22  0805 08/15/22  0739   Glucose 92   < > 96 91 100 H   Calcium 8.7   < > 9.2 8.9 9.0   Albumin 4.0   < > 4.2 4.2 4.2   Total Protein 6.5   < > 6.9 6.7 7.0   Sodium 137   < > 138 137 140   Potassium 4.5   < > 4.4 4.3 4.4   CO2 28   < > 27 28 29   Chloride 104   < > 103 104 106   BUN 16   < > 11 10 14   Alkaline Phosphatase 94  --   --   --   --    ALT 23   < > 11 15 14   AST 20   < > 13 16 17   Total Bilirubin 0.4   < > 0.4 0.5 0.5    < > = values in this interval not displayed.     URINALYSIS:       LIPIDS:  Recent Labs   Lab 02/04/21  0717 08/05/21  0739 02/18/22  0805 08/15/22  0739   TSH 3.58 3.28  --  21.81 H   HDL  --  50 50 52   Cholesterol  --  225 H 252 H 239 H   Triglycerides  --  138 146 121   LDL Cholesterol  --  148 H 173 H 163 H   HDL/Cholesterol Ratio  --  4.5 5.0 H 4.6   Non HDL Chol. (LDL+VLDL)  --  175 H 202 H 187 H     TSH:  Recent Labs   Lab 02/04/21  0717 08/05/21  0739 08/15/22  0739   TSH 3.58 3.28 21.81 H     A1C:        Assessment/Plan     Megha Machado is a 36 y.o.female with:    1. Annual physical exam  Discussed diet and exercise, vaccines and cancer screening, risk factors.  Screening labs reviewed.  2. Hypothyroidism (acquired)  - TSH; Future  - T4, Free; Future  Repeat thyroid function to confirm.    3. Obesity, unspecified classification, unspecified obesity type, unspecified whether serious comorbidity present  - tirzepatide (MOUNJARO) 2.5 mg/0.5 mL PnIj; Inject 2.5 mg into the skin every 7 days. Starting month  Dispense: 4 pen; Refill: 0  - tirzepatide  (MOUNJARO) 5 mg/0.5 mL PnIj; Inject 5 mg into the skin every 7 days. Continuing month dose  Dispense: 4 pen; Refill: 3    4. Metabolic syndrome  - tirzepatide (MOUNJARO) 2.5 mg/0.5 mL PnIj; Inject 2.5 mg into the skin every 7 days. Starting month  Dispense: 4 pen; Refill: 0  - tirzepatide (MOUNJARO) 5 mg/0.5 mL PnIj; Inject 5 mg into the skin every 7 days. Continuing month dose  Dispense: 4 pen; Refill: 3       Chronic conditions status updated as per HPI.  Other than changes above, cont current medications and maintain follow up with specialists.  No follow-ups on file.    No future appointments.    Julio Parker MD  [facility]

## 2022-08-25 ENCOUNTER — PATIENT MESSAGE (OUTPATIENT)
Dept: INTERNAL MEDICINE | Facility: CLINIC | Age: 37
End: 2022-08-25
Payer: COMMERCIAL

## 2022-08-25 DIAGNOSIS — E03.9 HYPOTHYROIDISM (ACQUIRED): Primary | ICD-10-CM

## 2022-08-25 RX ORDER — LEVOTHYROXINE SODIUM 125 UG/1
125 TABLET ORAL
Qty: 90 TABLET | Refills: 3 | Status: SHIPPED | OUTPATIENT
Start: 2022-08-25 | End: 2022-12-27 | Stop reason: SDUPTHER

## 2022-08-25 NOTE — TELEPHONE ENCOUNTER
Looks better but still too high.  I am going to increase synthroid to 125 mcg.  This may help her achieve her weight goals easier.

## 2022-09-06 ENCOUNTER — PATIENT MESSAGE (OUTPATIENT)
Dept: INTERNAL MEDICINE | Facility: CLINIC | Age: 37
End: 2022-09-06
Payer: COMMERCIAL

## 2022-09-06 ENCOUNTER — HOSPITAL ENCOUNTER (EMERGENCY)
Facility: HOSPITAL | Age: 37
Discharge: HOME | End: 2022-09-06
Attending: EMERGENCY MEDICINE
Payer: COMMERCIAL

## 2022-09-06 VITALS
OXYGEN SATURATION: 100 % | WEIGHT: 176 LBS | DIASTOLIC BLOOD PRESSURE: 73 MMHG | TEMPERATURE: 98 F | HEART RATE: 53 BPM | RESPIRATION RATE: 18 BRPM | SYSTOLIC BLOOD PRESSURE: 125 MMHG | BODY MASS INDEX: 33.25 KG/M2

## 2022-09-06 DIAGNOSIS — T50.905A ADVERSE EFFECT OF DRUG, INITIAL ENCOUNTER: ICD-10-CM

## 2022-09-06 DIAGNOSIS — N39.0 URINARY TRACT INFECTION WITHOUT HEMATURIA, SITE UNSPECIFIED: ICD-10-CM

## 2022-09-06 DIAGNOSIS — R11.2 NAUSEA AND VOMITING, INTRACTABILITY OF VOMITING NOT SPECIFIED, UNSPECIFIED VOMITING TYPE: Primary | ICD-10-CM

## 2022-09-06 LAB
ALBUMIN SERPL BCP-MCNC: 4 G/DL (ref 3.5–5.2)
ALP SERPL-CCNC: 94 U/L (ref 55–135)
ALT SERPL W/O P-5'-P-CCNC: 19 U/L (ref 10–44)
ANION GAP SERPL CALC-SCNC: 10 MMOL/L (ref 8–16)
AST SERPL-CCNC: 20 U/L (ref 10–40)
B-HCG UR QL: NEGATIVE
BACTERIA #/AREA URNS HPF: ABNORMAL /HPF
BASOPHILS # BLD AUTO: 0.06 K/UL (ref 0–0.2)
BASOPHILS NFR BLD: 0.8 % (ref 0–1.9)
BILIRUB SERPL-MCNC: 0.7 MG/DL (ref 0.1–1)
BILIRUB UR QL STRIP: NEGATIVE
BUN SERPL-MCNC: 13 MG/DL (ref 6–20)
CALCIUM SERPL-MCNC: 9 MG/DL (ref 8.7–10.5)
CHLORIDE SERPL-SCNC: 105 MMOL/L (ref 95–110)
CLARITY UR: ABNORMAL
CO2 SERPL-SCNC: 23 MMOL/L (ref 23–29)
COLOR UR: YELLOW
CREAT SERPL-MCNC: 1 MG/DL (ref 0.5–1.4)
CTP QC/QA: YES
DIFFERENTIAL METHOD: NORMAL
EOSINOPHIL # BLD AUTO: 0.2 K/UL (ref 0–0.5)
EOSINOPHIL NFR BLD: 2.9 % (ref 0–8)
ERYTHROCYTE [DISTWIDTH] IN BLOOD BY AUTOMATED COUNT: 12.6 % (ref 11.5–14.5)
EST. GFR  (NO RACE VARIABLE): >60 ML/MIN/1.73 M^2
GLUCOSE SERPL-MCNC: 81 MG/DL (ref 70–110)
GLUCOSE UR QL STRIP: NEGATIVE
HCT VFR BLD AUTO: 42.8 % (ref 37–48.5)
HGB BLD-MCNC: 14.4 G/DL (ref 12–16)
HGB UR QL STRIP: ABNORMAL
IMM GRANULOCYTES # BLD AUTO: 0.02 K/UL (ref 0–0.04)
IMM GRANULOCYTES NFR BLD AUTO: 0.3 % (ref 0–0.5)
KETONES UR QL STRIP: ABNORMAL
LEUKOCYTE ESTERASE UR QL STRIP: ABNORMAL
LIPASE SERPL-CCNC: 19 U/L (ref 4–60)
LYMPHOCYTES # BLD AUTO: 2.1 K/UL (ref 1–4.8)
LYMPHOCYTES NFR BLD: 28.2 % (ref 18–48)
MAGNESIUM SERPL-MCNC: 2.1 MG/DL (ref 1.6–2.6)
MCH RBC QN AUTO: 29.2 PG (ref 27–31)
MCHC RBC AUTO-ENTMCNC: 33.6 G/DL (ref 32–36)
MCV RBC AUTO: 87 FL (ref 82–98)
MICROSCOPIC COMMENT: ABNORMAL
MONOCYTES # BLD AUTO: 0.6 K/UL (ref 0.3–1)
MONOCYTES NFR BLD: 7.9 % (ref 4–15)
NEUTROPHILS # BLD AUTO: 4.5 K/UL (ref 1.8–7.7)
NEUTROPHILS NFR BLD: 59.9 % (ref 38–73)
NITRITE UR QL STRIP: NEGATIVE
NRBC BLD-RTO: 0 /100 WBC
PH UR STRIP: 7 [PH] (ref 5–8)
PLATELET # BLD AUTO: 289 K/UL (ref 150–450)
PMV BLD AUTO: 10.4 FL (ref 9.2–12.9)
POTASSIUM SERPL-SCNC: 4.1 MMOL/L (ref 3.5–5.1)
PROT SERPL-MCNC: 7.5 G/DL (ref 6–8.4)
PROT UR QL STRIP: NEGATIVE
RBC # BLD AUTO: 4.93 M/UL (ref 4–5.4)
RBC #/AREA URNS HPF: 4 /HPF (ref 0–4)
SODIUM SERPL-SCNC: 138 MMOL/L (ref 136–145)
SP GR UR STRIP: 1.01 (ref 1–1.03)
SQUAMOUS #/AREA URNS HPF: 8 /HPF
URN SPEC COLLECT METH UR: ABNORMAL
UROBILINOGEN UR STRIP-ACNC: NEGATIVE EU/DL
WBC # BLD AUTO: 7.46 K/UL (ref 3.9–12.7)
WBC #/AREA URNS HPF: 24 /HPF (ref 0–5)

## 2022-09-06 PROCEDURE — 83690 ASSAY OF LIPASE: CPT | Performed by: EMERGENCY MEDICINE

## 2022-09-06 PROCEDURE — 81025 URINE PREGNANCY TEST: CPT | Performed by: EMERGENCY MEDICINE

## 2022-09-06 PROCEDURE — 81000 URINALYSIS NONAUTO W/SCOPE: CPT | Performed by: EMERGENCY MEDICINE

## 2022-09-06 PROCEDURE — 25000003 PHARM REV CODE 250: Performed by: EMERGENCY MEDICINE

## 2022-09-06 PROCEDURE — 80053 COMPREHEN METABOLIC PANEL: CPT | Performed by: EMERGENCY MEDICINE

## 2022-09-06 PROCEDURE — 63600175 PHARM REV CODE 636 W HCPCS: Performed by: EMERGENCY MEDICINE

## 2022-09-06 PROCEDURE — 83735 ASSAY OF MAGNESIUM: CPT | Performed by: EMERGENCY MEDICINE

## 2022-09-06 PROCEDURE — 87086 URINE CULTURE/COLONY COUNT: CPT | Performed by: EMERGENCY MEDICINE

## 2022-09-06 PROCEDURE — 96361 HYDRATE IV INFUSION ADD-ON: CPT

## 2022-09-06 PROCEDURE — 85025 COMPLETE CBC W/AUTO DIFF WBC: CPT | Performed by: EMERGENCY MEDICINE

## 2022-09-06 PROCEDURE — 96374 THER/PROPH/DIAG INJ IV PUSH: CPT

## 2022-09-06 PROCEDURE — 99284 EMERGENCY DEPT VISIT MOD MDM: CPT | Mod: 25

## 2022-09-06 RX ORDER — ONDANSETRON 2 MG/ML
4 INJECTION INTRAMUSCULAR; INTRAVENOUS
Status: COMPLETED | OUTPATIENT
Start: 2022-09-06 | End: 2022-09-06

## 2022-09-06 RX ORDER — SULFAMETHOXAZOLE AND TRIMETHOPRIM 800; 160 MG/1; MG/1
1 TABLET ORAL 2 TIMES DAILY
Qty: 14 TABLET | Refills: 0 | Status: SHIPPED | OUTPATIENT
Start: 2022-09-06 | End: 2022-09-13

## 2022-09-06 RX ORDER — ONDANSETRON 4 MG/1
4 TABLET, ORALLY DISINTEGRATING ORAL EVERY 6 HOURS PRN
Qty: 15 TABLET | Refills: 0 | Status: SHIPPED | OUTPATIENT
Start: 2022-09-06

## 2022-09-06 RX ADMIN — SODIUM CHLORIDE 1000 ML: 0.9 INJECTION, SOLUTION INTRAVENOUS at 10:09

## 2022-09-06 RX ADMIN — ONDANSETRON 4 MG: 2 INJECTION INTRAMUSCULAR; INTRAVENOUS at 10:09

## 2022-09-06 NOTE — ED TRIAGE NOTES
Pt reports to the ED with nausea after starting new medications.       Patient identifiers verified and correct.     APPEARANCE: Patient not in acute distress.  NEURO: Awake, alert, appropriate for age, condition, and situation, pupils equal, round, and reactive.   HEENT: Head symmetrical. Eyes bilateral.  Bilateral ears without drainage. Bilateral nares patent, throat clear.  CARDIAC: Regular rate and rhythm  RESPIRATORY: Airway is open and patent. Respirations are normal and spontaneous on room air.   GI/: Abdomen soft and non-distended.   NEUROVASCULAR: All extremities are warm and pink. .  MUSCULOSKELETAL: Moves all extremities.   SKIN: Warm and dry, adequate turgor, mucus membranes moist and pink; no breakdown, lesions, or ecchymosis noted.     Will continue to monitor.

## 2022-09-06 NOTE — ED PROVIDER NOTES
Encounter Date: 2022    SCRIBE #1 NOTE: I, Zhao Sandy Jr, am scribing for, and in the presence of,  Angel Oneal MD. I have scribed the following portions of the note - Other sections scribed: HPI, ROS, PE, MDM.     History     Chief Complaint   Patient presents with    Nausea     Pt states on mounjaro, first injection on sun, pt complains of n/v since injection, denies abd pain or diarrhea      Megha Machado is a 36 y.o. female who has no past medical history on file. The patient presents to the ED due to nausea; onset two days. Associated symptoms include abdominal pain, chest pain, lethargy, and vomiting. The patient reports she developed nausea after the first diabetic medication injection (Mounjaro) on . Patient began to vomit hours later. She states symptoms have not ceased; and vomiting occurs when ingesting fluids or solid food. Patient reports she felt nauseous this morning, but did not vomit. She is allergic to adhesives and penicillins.         The history is provided by the patient. No  was used.   Review of patient's allergies indicates:   Allergen Reactions    Adhesive      tape    Penicillins      No past medical history on file.  Past Surgical History:   Procedure Laterality Date     SECTION  06, 08, 16    TUBAL LIGATION  16     No family history on file.  Social History     Tobacco Use    Smoking status: Never    Smokeless tobacco: Never   Substance Use Topics    Alcohol use: Yes     Alcohol/week: 1.0 standard drink     Types: 1 Glasses of wine per week    Drug use: Never     Review of Systems   Constitutional:         Positive lethargic.   Cardiovascular:  Positive for chest pain.   Gastrointestinal:  Positive for abdominal pain, nausea and vomiting.   All other systems reviewed and are negative.    Physical Exam     Initial Vitals [22 1000]   BP Pulse Resp Temp SpO2   125/69 64 18 98.1 °F (36.7 °C) 100 %      MAP       --          Physical Exam    Nursing note and vitals reviewed.  Constitutional: No distress.   HENT:   Head: Atraumatic.   Slightly dry mucous membrane.   Eyes: EOM are normal.   Neck: Neck supple.   Cardiovascular:  Normal rate, regular rhythm and normal heart sounds.           Pulmonary/Chest: Breath sounds normal.   Abdominal:   Abdomen is soft with diffuse tenderness. No guarding.   Musculoskeletal:      Cervical back: Neck supple.     Neurological: She is alert.   Skin: Skin is warm and dry.   Psychiatric: Thought content normal.       ED Course   Procedures  Labs Reviewed   URINALYSIS - Abnormal; Notable for the following components:       Result Value    Appearance, UA Hazy (*)     Ketones, UA Trace (*)     Occult Blood UA 2+ (*)     Leukocytes, UA 2+ (*)     All other components within normal limits   URINALYSIS MICROSCOPIC - Abnormal; Notable for the following components:    WBC, UA 24 (*)     Bacteria Many (*)     All other components within normal limits   CULTURE, URINE   CBC W/ AUTO DIFFERENTIAL   COMPREHENSIVE METABOLIC PANEL   LIPASE   MAGNESIUM   POCT URINE PREGNANCY          Imaging Results    None          Medications   sodium chloride 0.9% bolus 1,000 mL (0 mLs Intravenous Stopped 9/6/22 1226)   ondansetron injection 4 mg (4 mg Intravenous Given 9/6/22 1046)     Medical Decision Making:   History:   Old Medical Records: I decided to obtain old medical records.  Initial Assessment:   The patient presents to the ED due to nausea; onset two days. Associated symptoms include abdominal pain, chest pain, lethargy, and vomiting.   Differential Diagnosis:   Differential Diagnosis includes, but is not limited to:  Bowel obstruction, incarcerated/strangulated hernia, ileus, appendicitis, cholecystitis, aspirated foreign body, esophageal food impaction, biliary colic, colitis/diverticulitis, gastroenteritis, esophagitis, hepatitis, pancreatitis, GERD, PUD, constipation, nephrolithiasis, UTI/pyelonephritis.      Clinical Tests:   Lab Tests: Ordered and Reviewed  ED Management:  Patient was given IV fluids and Zofran here in the ED. No vomiting noted while in the ED. UA shows many bacteria with 24 wbc's.  Patient will be placed on Keflex for this and I will also write her a prescription for Zofran.  She should follow up with her physician when able for recheck but may return to the ED for any further intractable vomiting or any other urgent concerns.        Scribe Attestation:   Scribe #1: I performed the above scribed service and the documentation accurately describes the services I performed. I attest to the accuracy of the note.               I, Dr. Angel Oneal, personally performed the services described in this documentation. All medical record entries made by the scribe were at my direction and in my presence. I have reviewed the chart and agree that the record reflects my personal performance and is accurate and complete. Angel Oneal MD.  4:39 PM 09/06/2022    Clinical Impression:   Final diagnoses:  [R11.2] Nausea and vomiting, intractability of vomiting not specified, unspecified vomiting type (Primary)  [T50.905A] Adverse effect of drug, initial encounter  [N39.0] Urinary tract infection without hematuria, site unspecified      ED Disposition Condition    Discharge Stable          ED Prescriptions       Medication Sig Dispense Start Date End Date Auth. Provider    ondansetron (ZOFRAN-ODT) 4 MG TbDL Take 1 tablet (4 mg total) by mouth every 6 (six) hours as needed (Nausea or vomiting). 15 tablet 9/6/2022 -- Angel Oneal MD    sulfamethoxazole-trimethoprim 800-160mg (BACTRIM DS) 800-160 mg Tab Take 1 tablet by mouth 2 (two) times daily. for 7 days 14 tablet 9/6/2022 9/13/2022 Angel Oneal MD          Follow-up Information       Follow up With Specialties Details Why Contact Info    Julio Parker MD Internal Medicine Schedule an appointment as soon as possible for a visit   [id number]  [address] [address]  [phone]      [facility] Emergency Medicine  As needed [address] [facility] [address] [phone]             Angel Oneal MD  09/06/22 1640

## 2022-09-07 ENCOUNTER — TELEPHONE (OUTPATIENT)
Dept: INTERNAL MEDICINE | Facility: CLINIC | Age: 37
End: 2022-09-07
Payer: COMMERCIAL

## 2022-09-07 NOTE — TELEPHONE ENCOUNTER
Pt went to ER given bag of fluids, Zofran and Bactrim yesterday. Says meds not working still nauseated,not vomiting but very nauseated.     Told her she has kidney infection.  Can hold down 7up and Cracker, cannot drink water it comes right up    See ER note

## 2022-09-07 NOTE — TELEPHONE ENCOUNTER
----- Message from Precious Isbell sent at 9/7/2022  8:36 AM CDT -----  Contact: Self/Luisa  Patient is returning a phone call.  Who left a message for the patient: Luisa   Does patient know what this is regarding: Yes  Would you like a call back, or a response through your [facility] portal?:   call back   Comments:

## 2022-09-07 NOTE — TELEPHONE ENCOUNTER
That is what the ER doctor told her she is not really sure what caused it, she is feeling much better than when she spoke to us this morning. She ate and is at a volleyball game now

## 2022-09-07 NOTE — TELEPHONE ENCOUNTER
----- Message from Shameka Juan J sent at 9/7/2022  7:44 AM CDT -----  Contact: pt [phone]  She went to the ER yesterday for vomiting and was given medicine and the medicine is not working. She has been vomiting since Sunday 9/4/22.    Thank you

## 2022-09-08 LAB
BACTERIA UR CULT: NORMAL
BACTERIA UR CULT: NORMAL

## 2022-09-12 NOTE — TELEPHONE ENCOUNTER
If it is a side effect of mounjaro, it should continue to improve.  If she's back to normal by next week, we can discuss whether to give the Mounjaro another try.  I'd likely not have her repeat this though.

## 2022-09-26 ENCOUNTER — PATIENT MESSAGE (OUTPATIENT)
Dept: INTERNAL MEDICINE | Facility: CLINIC | Age: 37
End: 2022-09-26
Payer: COMMERCIAL

## 2022-10-03 ENCOUNTER — TELEPHONE (OUTPATIENT)
Dept: INTERNAL MEDICINE | Facility: CLINIC | Age: 37
End: 2022-10-03
Payer: COMMERCIAL

## 2022-10-03 DIAGNOSIS — R10.13 EPIGASTRIC PAIN: Primary | ICD-10-CM

## 2022-10-03 NOTE — TELEPHONE ENCOUNTER
Patient called, states last two nights she has been woken up with chest pain what she states felt like a heart attack, but did not go to ER stated to early in the morning.     She also stated that after taking the Mounjaro and going to the hospital she did stop it, but since then had not been able to eat, not really hungry, forcing herself to eat.     Offered to schedule with Dionna several times, she refused states you know her and she only wants to see you, Explained that you were booked and then you are going to be out your first opening is 10/21, offered again to schedule with Dionna she stated no.     She is scheduled for 10/21 at 11

## 2022-10-04 NOTE — TELEPHONE ENCOUNTER
Order in to get CT abdomen and labs to evaluate liver and pancreas further.  Please schedule asap

## 2022-10-06 ENCOUNTER — HOSPITAL ENCOUNTER (OUTPATIENT)
Dept: RADIOLOGY | Facility: HOSPITAL | Age: 37
Discharge: HOME | End: 2022-10-06
Attending: INTERNAL MEDICINE
Payer: COMMERCIAL

## 2022-10-06 DIAGNOSIS — R10.13 EPIGASTRIC PAIN: ICD-10-CM

## 2022-10-06 PROCEDURE — 25500020 PHARM REV CODE 255: Performed by: INTERNAL MEDICINE

## 2022-10-06 PROCEDURE — 74160 CT ABDOMEN W/CONTRAST: CPT | Mod: 26,,, | Performed by: RADIOLOGY

## 2022-10-06 PROCEDURE — 74160 CT ABDOMEN W/CONTRAST: CPT | Mod: TC

## 2022-10-06 PROCEDURE — 74160 CT ABDOMEN WITH CONTRAST: ICD-10-PCS | Mod: 26,,, | Performed by: RADIOLOGY

## 2022-10-06 RX ADMIN — IOHEXOL 30 ML: 350 INJECTION, SOLUTION INTRAVENOUS at 09:10

## 2022-10-06 RX ADMIN — IOHEXOL 75 ML: 350 INJECTION, SOLUTION INTRAVENOUS at 10:10

## 2022-10-10 ENCOUNTER — PATIENT MESSAGE (OUTPATIENT)
Dept: INTERNAL MEDICINE | Facility: CLINIC | Age: 37
End: 2022-10-10
Payer: COMMERCIAL

## 2022-10-16 ENCOUNTER — PATIENT MESSAGE (OUTPATIENT)
Dept: INTERNAL MEDICINE | Facility: CLINIC | Age: 37
End: 2022-10-16
Payer: COMMERCIAL

## 2022-10-16 DIAGNOSIS — K21.9 GERD WITHOUT ESOPHAGITIS: Primary | ICD-10-CM

## 2022-10-17 ENCOUNTER — PATIENT MESSAGE (OUTPATIENT)
Dept: INTERNAL MEDICINE | Facility: CLINIC | Age: 37
End: 2022-10-17
Payer: COMMERCIAL

## 2022-10-17 RX ORDER — PANTOPRAZOLE SODIUM 40 MG/1
40 TABLET, DELAYED RELEASE ORAL DAILY
Qty: 30 TABLET | Refills: 11 | Status: SHIPPED | OUTPATIENT
Start: 2022-10-17 | End: 2024-02-14

## 2022-12-27 ENCOUNTER — TELEPHONE (OUTPATIENT)
Dept: INTERNAL MEDICINE | Facility: CLINIC | Age: 37
End: 2022-12-27
Payer: COMMERCIAL

## 2022-12-27 DIAGNOSIS — E03.9 HYPOTHYROIDISM (ACQUIRED): ICD-10-CM

## 2022-12-27 RX ORDER — LEVOTHYROXINE SODIUM 125 UG/1
125 TABLET ORAL
Qty: 90 TABLET | Refills: 3 | Status: SHIPPED | OUTPATIENT
Start: 2022-12-27 | End: 2023-05-29

## 2022-12-27 NOTE — TELEPHONE ENCOUNTER
----- Message from Ale Dorsey sent at 12/27/2022  4:50 PM CST -----  Contact: [phone]  Pt is calling she needs to speak to someone she went to get her prescription filled and somehow the pharmacy closed out her prescription please give return call

## 2023-01-01 ENCOUNTER — PATIENT MESSAGE (OUTPATIENT)
Dept: INTERNAL MEDICINE | Facility: CLINIC | Age: 38
End: 2023-01-01
Payer: COMMERCIAL

## 2023-02-16 ENCOUNTER — TELEPHONE (OUTPATIENT)
Dept: PRIMARY CARE CLINIC | Facility: CLINIC | Age: 38
End: 2023-02-16
Payer: COMMERCIAL

## 2023-02-16 DIAGNOSIS — Z00.00 ANNUAL PHYSICAL EXAM: ICD-10-CM

## 2023-02-16 DIAGNOSIS — E03.9 HYPOTHYROIDISM (ACQUIRED): Primary | ICD-10-CM

## 2023-02-23 LAB
ALBUMIN SERPL-MCNC: 4 G/DL (ref 3.6–5.1)
ALBUMIN/GLOB SERPL: 1.5 (CALC) (ref 1–2.5)
ALP SERPL-CCNC: 93 U/L (ref 31–125)
ALT SERPL-CCNC: 19 U/L (ref 6–29)
AST SERPL-CCNC: 15 U/L (ref 10–30)
BASOPHILS # BLD AUTO: 77 CELLS/UL (ref 0–200)
BASOPHILS NFR BLD AUTO: 1.2 %
BILIRUB SERPL-MCNC: 0.4 MG/DL (ref 0.2–1.2)
BUN SERPL-MCNC: 14 MG/DL (ref 7–25)
BUN/CREAT SERPL: NORMAL (CALC) (ref 6–22)
CALCIUM SERPL-MCNC: 9 MG/DL (ref 8.6–10.2)
CHLORIDE SERPL-SCNC: 106 MMOL/L (ref 98–110)
CHOLEST SERPL-MCNC: 223 MG/DL
CHOLEST/HDLC SERPL: 4.3 (CALC)
CO2 SERPL-SCNC: 24 MMOL/L (ref 20–32)
CREAT SERPL-MCNC: 0.74 MG/DL (ref 0.5–0.97)
EGFR: 106 ML/MIN/1.73M2
EOSINOPHIL # BLD AUTO: 211 CELLS/UL (ref 15–500)
EOSINOPHIL NFR BLD AUTO: 3.3 %
ERYTHROCYTE [DISTWIDTH] IN BLOOD BY AUTOMATED COUNT: 12.4 % (ref 11–15)
GLOBULIN SER CALC-MCNC: 2.6 G/DL (CALC) (ref 1.9–3.7)
GLUCOSE SERPL-MCNC: 96 MG/DL (ref 65–99)
HCT VFR BLD AUTO: 42.9 % (ref 35–45)
HDLC SERPL-MCNC: 52 MG/DL
HGB BLD-MCNC: 14.2 G/DL (ref 11.7–15.5)
LDLC SERPL CALC-MCNC: 145 MG/DL (CALC)
LYMPHOCYTES # BLD AUTO: 1658 CELLS/UL (ref 850–3900)
LYMPHOCYTES NFR BLD AUTO: 25.9 %
MCH RBC QN AUTO: 28.9 PG (ref 27–33)
MCHC RBC AUTO-ENTMCNC: 33.1 G/DL (ref 32–36)
MCV RBC AUTO: 87.4 FL (ref 80–100)
MONOCYTES # BLD AUTO: 589 CELLS/UL (ref 200–950)
MONOCYTES NFR BLD AUTO: 9.2 %
NEUTROPHILS # BLD AUTO: 3866 CELLS/UL (ref 1500–7800)
NEUTROPHILS NFR BLD AUTO: 60.4 %
NONHDLC SERPL-MCNC: 171 MG/DL (CALC)
PLATELET # BLD AUTO: 287 THOUSAND/UL (ref 140–400)
PMV BLD REES-ECKER: 10.9 FL (ref 7.5–12.5)
POTASSIUM SERPL-SCNC: 4.5 MMOL/L (ref 3.5–5.3)
PROT SERPL-MCNC: 6.6 G/DL (ref 6.1–8.1)
RBC # BLD AUTO: 4.91 MILLION/UL (ref 3.8–5.1)
SODIUM SERPL-SCNC: 139 MMOL/L (ref 135–146)
T4 FREE SERPL-MCNC: 1.5 NG/DL (ref 0.8–1.8)
TRIGL SERPL-MCNC: 139 MG/DL
TSH SERPL-ACNC: 0.59 MIU/L
WBC # BLD AUTO: 6.4 THOUSAND/UL (ref 3.8–10.8)

## 2023-03-03 ENCOUNTER — TELEPHONE (OUTPATIENT)
Dept: PRIMARY CARE CLINIC | Facility: CLINIC | Age: 38
End: 2023-03-03

## 2023-03-03 ENCOUNTER — OFFICE VISIT (OUTPATIENT)
Dept: PRIMARY CARE CLINIC | Facility: CLINIC | Age: 38
End: 2023-03-03
Payer: COMMERCIAL

## 2023-03-03 ENCOUNTER — PATIENT MESSAGE (OUTPATIENT)
Dept: PRIMARY CARE CLINIC | Facility: CLINIC | Age: 38
End: 2023-03-03

## 2023-03-03 VITALS
BODY MASS INDEX: 33 KG/M2 | HEIGHT: 61 IN | SYSTOLIC BLOOD PRESSURE: 100 MMHG | WEIGHT: 174.81 LBS | HEART RATE: 59 BPM | DIASTOLIC BLOOD PRESSURE: 64 MMHG | OXYGEN SATURATION: 98 %

## 2023-03-03 DIAGNOSIS — E03.9 HYPOTHYROIDISM (ACQUIRED): Primary | ICD-10-CM

## 2023-03-03 DIAGNOSIS — E66.9 OBESITY, UNSPECIFIED CLASSIFICATION, UNSPECIFIED OBESITY TYPE, UNSPECIFIED WHETHER SERIOUS COMORBIDITY PRESENT: ICD-10-CM

## 2023-03-03 DIAGNOSIS — R60.0 PERIPHERAL EDEMA: ICD-10-CM

## 2023-03-03 PROCEDURE — 3074F PR MOST RECENT SYSTOLIC BLOOD PRESSURE < 130 MM HG: ICD-10-PCS | Mod: CPTII,S$GLB,, | Performed by: INTERNAL MEDICINE

## 2023-03-03 PROCEDURE — 1159F PR MEDICATION LIST DOCUMENTED IN MEDICAL RECORD: ICD-10-PCS | Mod: CPTII,S$GLB,, | Performed by: INTERNAL MEDICINE

## 2023-03-03 PROCEDURE — 3008F PR BODY MASS INDEX (BMI) DOCUMENTED: ICD-10-PCS | Mod: CPTII,S$GLB,, | Performed by: INTERNAL MEDICINE

## 2023-03-03 PROCEDURE — 99999 PR PBB SHADOW E&M-EST. PATIENT-LVL III: CPT | Mod: PBBFAC,,, | Performed by: INTERNAL MEDICINE

## 2023-03-03 PROCEDURE — 3074F SYST BP LT 130 MM HG: CPT | Mod: CPTII,S$GLB,, | Performed by: INTERNAL MEDICINE

## 2023-03-03 PROCEDURE — 3078F PR MOST RECENT DIASTOLIC BLOOD PRESSURE < 80 MM HG: ICD-10-PCS | Mod: CPTII,S$GLB,, | Performed by: INTERNAL MEDICINE

## 2023-03-03 PROCEDURE — 1160F PR REVIEW ALL MEDS BY PRESCRIBER/CLIN PHARMACIST DOCUMENTED: ICD-10-PCS | Mod: CPTII,S$GLB,, | Performed by: INTERNAL MEDICINE

## 2023-03-03 PROCEDURE — 1160F RVW MEDS BY RX/DR IN RCRD: CPT | Mod: CPTII,S$GLB,, | Performed by: INTERNAL MEDICINE

## 2023-03-03 PROCEDURE — 99999 PR PBB SHADOW E&M-EST. PATIENT-LVL III: ICD-10-PCS | Mod: PBBFAC,,, | Performed by: INTERNAL MEDICINE

## 2023-03-03 PROCEDURE — 99395 PREV VISIT EST AGE 18-39: CPT | Mod: S$GLB,,, | Performed by: INTERNAL MEDICINE

## 2023-03-03 PROCEDURE — 3078F DIAST BP <80 MM HG: CPT | Mod: CPTII,S$GLB,, | Performed by: INTERNAL MEDICINE

## 2023-03-03 PROCEDURE — 1159F MED LIST DOCD IN RCRD: CPT | Mod: CPTII,S$GLB,, | Performed by: INTERNAL MEDICINE

## 2023-03-03 PROCEDURE — 99395 PR PREVENTIVE VISIT,EST,18-39: ICD-10-PCS | Mod: S$GLB,,, | Performed by: INTERNAL MEDICINE

## 2023-03-03 PROCEDURE — 3008F BODY MASS INDEX DOCD: CPT | Mod: CPTII,S$GLB,, | Performed by: INTERNAL MEDICINE

## 2023-03-03 RX ORDER — NORETHINDRONE ACETATE AND ETHINYL ESTRADIOL, ETHINYL ESTRADIOL AND FERROUS FUMARATE 1MG-10(24)
1 KIT ORAL
COMMUNITY
Start: 2023-02-17 | End: 2024-02-14

## 2023-03-03 RX ORDER — PHENTERMINE AND TOPIRAMATE 7.5; 46 MG/1; MG/1
1 CAPSULE, EXTENDED RELEASE ORAL DAILY
Qty: 30 CAPSULE | Refills: 2 | Status: SHIPPED | OUTPATIENT
Start: 2023-03-03 | End: 2023-09-05

## 2023-03-03 NOTE — PROGRESS NOTES
[facility] Note    Chief Complaint      Chief Complaint   Patient presents with    Follow-up     6 month f/u       History of Present Illness      Megha Machado is a 37 y.o. female with chronic conditions of hypothyroidism who presents today for: annual preventative visit.  Tried mounjaro but had intense nausea/vomiting.    Hypothyroidism: TSH at goal on synthroid.  Peripheral edema: Controlled on maxzide as needed.  Diet: Prepares own food mostly.  Limiting fatty foods and carbs.  Drinks plenty water  Exercise: Stays active.    Denies drinking and driving, drinking more than 4 drinks on occasion, drug use.     Flu shot declines.  Td declines.  COVID vaccine UTD.  Mammogram with Dr. Rogel.  PAP smear UTD with DR. Rogel.  Cscope due age 44.      Past Medical History:  History reviewed. No pertinent past medical history.    Past Surgical History:   has a past surgical history that includes  section (06, 08, 16) and Tubal ligation (16).    Family History:  family history is not on file.     Social History:  Social History     Tobacco Use    Smoking status: Never    Smokeless tobacco: Never   Substance Use Topics    Alcohol use: Yes     Alcohol/week: 1.0 standard drink     Types: 1 Glasses of wine per week    Drug use: Never       I personally reviewed all past medical, surgical, social and family history.    Review of Systems   Constitutional:  Negative for chills, fever and malaise/fatigue.   Respiratory:  Negative for shortness of breath.    Cardiovascular:  Negative for chest pain.   Gastrointestinal:  Negative for constipation, diarrhea, nausea and vomiting.   Skin:  Negative for rash.   Neurological:  Negative for weakness.   All other systems reviewed and are negative.     Medications:  Outpatient Encounter Medications as of 3/3/2023   Medication Sig Dispense Refill    levothyroxine (SYNTHROID) 125 MCG tablet Take 1 tablet (125 mcg total) by mouth before  "breakfast. Brand name only 90 tablet 3    LO LOESTRIN FE 1 mg-10 mcg (24)/10 mcg (2) Tab Take 1 tablet by mouth.      meloxicam (MOBIC) 15 MG tablet TAKE 1 TABLET(15 MG) BY MOUTH EVERY DAY 30 tablet 0    ondansetron (ZOFRAN-ODT) 4 MG TbDL Take 1 tablet (4 mg total) by mouth every 6 (six) hours as needed (Nausea or vomiting). 15 tablet 0    pantoprazole (PROTONIX) 40 MG tablet Take 1 tablet (40 mg total) by mouth once daily. 30 tablet 11    phentermine-topiramate (QSYMIA) 7.5-46 mg CM24 Take 1 capsule by mouth once daily. 30 capsule 2    triamterene-hydrochlorothiazide 37.5-25 mg (MAXZIDE-25) 37.5-25 mg per tablet Take 1 tablet by mouth once daily. 30 tablet 11    [DISCONTINUED] tirzepatide (MOUNJARO) 2.5 mg/0.5 mL PnIj Inject 2.5 mg into the skin every 7 days. Starting month 4 pen 0    [DISCONTINUED] tirzepatide (MOUNJARO) 5 mg/0.5 mL PnIj Inject 5 mg into the skin every 7 days. Continuing month dose (Patient not taking: Reported on 3/3/2023) 4 pen 3     No facility-administered encounter medications on file as of 3/3/2023.       Allergies:  Review of patient's allergies indicates:   Allergen Reactions    Adhesive      tape    Penicillins        Health Maintenance:  Immunization History   Administered Date(s) Administered    COVID-19, MRNA, LN-S, PF (Pfizer) (Purple Cap) 08/05/2021, 09/23/2021    Influenza 02/12/2020      Health Maintenance   Topic Date Due    TETANUS VACCINE  Never done    Hepatitis C Screening  Completed    Lipid Panel  Completed        Physical Exam      Vital Signs  Pulse: (!) 59  SpO2: 98 %  BP: 100/64  BP Location: Left arm  Patient Position: Sitting  Pain Score: 0-No pain  Height and Weight  Height: 5' 1" (154.9 cm)  Weight: 79.3 kg (174 lb 13.2 oz)  BSA (Calculated - sq m): 1.85 sq meters  BMI (Calculated): 33  Weight in (lb) to have BMI = 25: 132]    Physical Exam  Vitals reviewed.   Constitutional:       Appearance: She is well-developed.   HENT:      Head: Normocephalic and atraumatic.    "   Right Ear: External ear normal.      Left Ear: External ear normal.   Cardiovascular:      Rate and Rhythm: Normal rate and regular rhythm.      Heart sounds: Normal heart sounds. No murmur heard.  Pulmonary:      Effort: Pulmonary effort is normal.      Breath sounds: Normal breath sounds. No wheezing or rales.   Abdominal:      General: Bowel sounds are normal. There is no distension.      Palpations: Abdomen is soft.      Tenderness: There is no abdominal tenderness.        Laboratory:  CBC:  Recent Labs   Lab 09/06/22  1047 10/06/22  0844 02/22/23  0726   WBC 7.46 7.82 6.4   RBC 4.93 5.03 4.91   Hemoglobin 14.4 14.6 14.2   Hematocrit 42.8 44.0 42.9   Platelets 289 301 287   MCV 87 88 87.4   MCH 29.2 29.0 28.9   MCHC 33.6 33.2 33.1     CMP:  Recent Labs   Lab 09/06/22  1047 10/06/22  0844 02/22/23  0726   Glucose 81 104 96   Calcium 9.0 9.0 9.0   Albumin 4.0 4.1 4.0   Total Protein 7.5 7.3 6.6   Sodium 138 138 139   Potassium 4.1 4.2 4.5   CO2 23 25 24   Chloride 105 106 106   BUN 13 10 14   Alkaline Phosphatase 94 92  --    ALT 19 19 19   AST 20 17 15   Total Bilirubin 0.7 0.4 0.4     URINALYSIS:  Recent Labs   Lab 09/06/22  1221   Color, UA Yellow   Specific Gravity, UA 1.010   pH, UA 7.0   Protein, UA Negative   Bacteria Many A   Nitrite, UA Negative   Leukocytes, UA 2+ A   Urobilinogen, UA Negative      LIPIDS:  Recent Labs   Lab 02/18/22  0805 08/15/22  0739 08/23/22  0853 02/22/23  0726   TSH  --  21.81 H 6.297 H 0.59   HDL 50 52  --  52   Cholesterol 252 H 239 H  --  223 H   Triglycerides 146 121  --  139   LDL Cholesterol 173 H 163 H  --  145 H   HDL/Cholesterol Ratio 5.0 H 4.6  --  4.3   Non HDL Chol. (LDL+VLDL) 202 H 187 H  --  171 H     TSH:  Recent Labs   Lab 08/15/22  0739 08/23/22  0853 02/22/23  0726   TSH 21.81 H 6.297 H 0.59     A1C:        Assessment/Plan     Megha Machado is a 37 y.o.female with:    1. Hypothyroidism (acquired)  Continue current meds.    2. Peripheral edema  Continue  current meds.    3. Obesity, unspecified classification, unspecified obesity type, unspecified whether serious comorbidity present  - phentermine-topiramate (QSYMIA) 7.5-46 mg CM24; Take 1 capsule by mouth once daily.  Dispense: 30 capsule; Refill: 2  Start back on qsymia     Chronic conditions status updated as per HPI.  Other than changes above, cont current medications and maintain follow up with specialists.  Follow up in about 6 months (around 9/3/2023) for Virtual Follow Up.    No future appointments.    Julio Parker MD  [facility]

## 2023-03-03 NOTE — TELEPHONE ENCOUNTER
----- Message from Isidra Segovia sent at 3/3/2023  3:26 PM CST -----  Contact: Patient @ [phone]  Good Afternoon  Patient called and pharmacy need a PA for the QSYMIA to pay     Please call and advise

## 2023-05-12 ENCOUNTER — PATIENT MESSAGE (OUTPATIENT)
Dept: PRIMARY CARE CLINIC | Facility: CLINIC | Age: 38
End: 2023-05-12
Payer: COMMERCIAL

## 2023-05-18 ENCOUNTER — PATIENT MESSAGE (OUTPATIENT)
Dept: PRIMARY CARE CLINIC | Facility: CLINIC | Age: 38
End: 2023-05-18
Payer: COMMERCIAL

## 2023-05-18 DIAGNOSIS — E03.9 HYPOTHYROIDISM (ACQUIRED): Primary | ICD-10-CM

## 2023-05-18 NOTE — TELEPHONE ENCOUNTER
Lab levels looked fine 2/2023 but if she's continued to lose weight since then, it's worth rechecking. Lab orders placed at [facility]

## 2023-05-24 ENCOUNTER — PATIENT MESSAGE (OUTPATIENT)
Dept: PRIMARY CARE CLINIC | Facility: CLINIC | Age: 38
End: 2023-05-24
Payer: COMMERCIAL

## 2023-05-24 LAB
T4 FREE SERPL-MCNC: 1.5 NG/DL (ref 0.8–1.8)
TSH SERPL-ACNC: 0.11 MIU/L

## 2023-05-26 ENCOUNTER — PATIENT MESSAGE (OUTPATIENT)
Dept: PRIMARY CARE CLINIC | Facility: CLINIC | Age: 38
End: 2023-05-26
Payer: COMMERCIAL

## 2023-05-26 DIAGNOSIS — E03.9 HYPOTHYROIDISM (ACQUIRED): Primary | ICD-10-CM

## 2023-05-29 RX ORDER — LEVOTHYROXINE SODIUM 112 UG/1
112 TABLET ORAL
Qty: 90 TABLET | Refills: 3 | Status: SHIPPED | OUTPATIENT
Start: 2023-05-29

## 2023-07-18 ENCOUNTER — PATIENT MESSAGE (OUTPATIENT)
Dept: PRIMARY CARE CLINIC | Facility: CLINIC | Age: 38
End: 2023-07-18
Payer: COMMERCIAL

## 2023-07-18 DIAGNOSIS — M25.531 RIGHT WRIST PAIN: Primary | ICD-10-CM

## 2023-09-05 ENCOUNTER — OFFICE VISIT (OUTPATIENT)
Dept: PRIMARY CARE CLINIC | Facility: CLINIC | Age: 38
End: 2023-09-05
Payer: COMMERCIAL

## 2023-09-05 DIAGNOSIS — E03.9 HYPOTHYROIDISM (ACQUIRED): Primary | ICD-10-CM

## 2023-09-05 PROCEDURE — 99214 PR OFFICE/OUTPT VISIT, EST, LEVL IV, 30-39 MIN: ICD-10-PCS | Mod: 95,,, | Performed by: INTERNAL MEDICINE

## 2023-09-05 PROCEDURE — 99214 OFFICE O/P EST MOD 30 MIN: CPT | Mod: 95,,, | Performed by: INTERNAL MEDICINE

## 2023-09-05 NOTE — PROGRESS NOTES
[facility] Virtual Visit Note    The patient location is: [place]  The chief complaint leading to consultation is:   Follow-up chronic conditions.  Visit type: Virtual visit with synchronous audio and video  Total time spent with patient:   20 minutes  Each patient to whom he or she provides medical services by telemedicine is:  (1) informed of the relationship between the physician and patient and the respective role of any other health care provider with respect to management of the patient; and (2) notified that he or she may decline to receive medical services by telemedicine and may withdraw from such care at any time.      Chief Complaint    No chief complaint on file.      History of Present Illness      Megha Machado is a 37 y.o. female with chronic conditions of hypothyroidism who presents today for: follow-up chronic conditions.   Complains of hair loss and weight loss despite reducing thyroid medication dose with last labs.  Hypothyroidism: TSH at goal on synthroid.  Peripheral edema: Controlled on maxzide as needed.   Flu shot declines.  Td declines.  COVID vaccine UTD.  Mammogram with Dr. Rogel.  PAP smear UTD with DR. Rogel.  Cscope due age 44.        Past Medical History:  No past medical history on file.    Past Surgical History:   has a past surgical history that includes  section (06, 08, 16) and Tubal ligation (16).    Family History:  family history is not on file.     Social History:  Social History     Tobacco Use    Smoking status: Never    Smokeless tobacco: Never   Substance Use Topics    Alcohol use: Yes     Alcohol/week: 1.0 standard drink of alcohol     Types: 1 Glasses of wine per week    Drug use: Never       Review of Systems   Constitutional:  Negative for chills, fever and malaise/fatigue.   HENT:  Negative for hearing loss.    Eyes:  Negative for discharge.   Respiratory:  Negative for shortness of breath and wheezing.    Cardiovascular:   Negative for chest pain and palpitations.   Gastrointestinal:  Negative for blood in stool, constipation, diarrhea, nausea and vomiting.   Genitourinary:  Negative for dysuria and hematuria.   Musculoskeletal:  Negative for neck pain.   Skin:  Negative for rash.   Neurological:  Positive for headaches. Negative for weakness.   Endo/Heme/Allergies:  Negative for polydipsia.   All other systems reviewed and are negative.       Medications:  Outpatient Encounter Medications as of 9/5/2023   Medication Sig Dispense Refill    levothyroxine (SYNTHROID) 112 MCG tablet Take 1 tablet (112 mcg total) by mouth before breakfast. 90 tablet 3    LO LOESTRIN FE 1 mg-10 mcg (24)/10 mcg (2) Tab Take 1 tablet by mouth.      meloxicam (MOBIC) 15 MG tablet TAKE 1 TABLET(15 MG) BY MOUTH EVERY DAY 30 tablet 0    ondansetron (ZOFRAN-ODT) 4 MG TbDL Take 1 tablet (4 mg total) by mouth every 6 (six) hours as needed (Nausea or vomiting). 15 tablet 0    pantoprazole (PROTONIX) 40 MG tablet Take 1 tablet (40 mg total) by mouth once daily. 30 tablet 11    triamterene-hydrochlorothiazide 37.5-25 mg (MAXZIDE-25) 37.5-25 mg per tablet Take 1 tablet by mouth once daily. 30 tablet 11    [DISCONTINUED] phentermine-topiramate (QSYMIA) 7.5-46 mg CM24 Take 1 capsule by mouth once daily. 30 capsule 2     No facility-administered encounter medications on file as of 9/5/2023.       Allergies:  Review of patient's allergies indicates:   Allergen Reactions    Adhesive      tape    Penicillins        Health Maintenance:  Immunization History   Administered Date(s) Administered    COVID-19, MRNA, LN-S, PF (Pfizer) (Purple Cap) 08/05/2021, 09/23/2021    Influenza 02/12/2020      Health Maintenance   Topic Date Due    TETANUS VACCINE  Never done    Hepatitis C Screening  Completed    Lipid Panel  Completed        Physical Exam       ]    Physical Exam  Constitutional:       General: She is not in acute distress.     Appearance: She is well-developed. She is not  diaphoretic.   Eyes:      General: No scleral icterus.        Right eye: No discharge.         Left eye: No discharge.      Conjunctiva/sclera: Conjunctivae normal.   Pulmonary:      Effort: Pulmonary effort is normal. No respiratory distress.   Neurological:      Mental Status: She is alert and oriented to person, place, and time.   Psychiatric:         Mood and Affect: Mood normal.         Behavior: Behavior normal.         Thought Content: Thought content normal.         Judgment: Judgment normal.          Laboratory:  CBC:  Recent Labs   Lab 09/06/22  1047 10/06/22  0844 02/22/23  0726   WBC 7.46 7.82 6.4   RBC 4.93 5.03 4.91   Hemoglobin 14.4 14.6 14.2   Hematocrit 42.8 44.0 42.9   Platelets 289 301 287   MCV 87 88 87.4   MCH 29.2 29.0 28.9   MCHC 33.6 33.2 33.1     CMP:  Recent Labs   Lab 09/06/22  1047 10/06/22  0844 02/22/23  0726   Glucose 81 104 96   Calcium 9.0 9.0 9.0   Albumin 4.0 4.1 4.0   Total Protein 7.5 7.3 6.6   Sodium 138 138 139   Potassium 4.1 4.2 4.5   CO2 23 25 24   Chloride 105 106 106   BUN 13 10 14   Alkaline Phosphatase 94 92  --    ALT 19 19 19   AST 20 17 15   Total Bilirubin 0.7 0.4 0.4     URINALYSIS:  Recent Labs   Lab 09/06/22  1221   Color, UA Yellow   Specific Gravity, UA 1.010   pH, UA 7.0   Protein, UA Negative   Bacteria Many A   Nitrite, UA Negative   Leukocytes, UA 2+ A   Urobilinogen, UA Negative      LIPIDS:  Recent Labs   Lab 02/18/22  0805 08/15/22  0739 08/23/22  0853 02/22/23  0726 05/23/23  0802   TSH  --  21.81 H 6.297 H 0.59 0.11 L   HDL 50 52  --  52  --    Cholesterol 252 H 239 H  --  223 H  --    Triglycerides 146 121  --  139  --    LDL Cholesterol 173 H 163 H  --  145 H  --    HDL/Cholesterol Ratio 5.0 H 4.6  --  4.3  --    Non HDL Chol. (LDL+VLDL) 202 H 187 H  --  171 H  --      TSH:  Recent Labs   Lab 08/23/22  0853 02/22/23  0726 05/23/23  0802   TSH 6.297 H 0.59 0.11 L     A1C:        Assessment/Plan     Megha Carter is a 37 y.o.female with:    1.  Hypothyroidism (acquired)  - Comprehensive Metabolic Panel; Future  - TSH; Future  - T4, Free; Future  - Comprehensive Metabolic Panel  - TSH  - T4, Free  Continue current meds.  Check labs  to see if dose adjustment is required.    Chronic conditions status updated as per HPI.  Other than changes above, cont current medications and maintain follow up with specialists.  No follow-ups on file.    No future appointments.      Julio Parker MD  [facility]                  Answers submitted by the patient for this visit:  Review of Systems Questionnaire (Submitted on 9/4/2023)  activity change: Yes  unexpected weight change: No  rhinorrhea: No  trouble swallowing: No  visual disturbance: No  chest tightness: No  polyuria: No  difficulty urinating: No  menstrual problem: No  joint swelling: No  arthralgias: No  confusion: No  dysphoric mood: No

## 2023-09-13 LAB
ALBUMIN SERPL-MCNC: 4.2 G/DL (ref 3.6–5.1)
ALBUMIN/GLOB SERPL: 1.8 (CALC) (ref 1–2.5)
ALP SERPL-CCNC: 81 U/L (ref 31–125)
ALT SERPL-CCNC: 14 U/L (ref 6–29)
AST SERPL-CCNC: 14 U/L (ref 10–30)
BILIRUB SERPL-MCNC: 0.5 MG/DL (ref 0.2–1.2)
BUN SERPL-MCNC: 12 MG/DL (ref 7–25)
BUN/CREAT SERPL: NORMAL (CALC) (ref 6–22)
CALCIUM SERPL-MCNC: 8.9 MG/DL (ref 8.6–10.2)
CHLORIDE SERPL-SCNC: 104 MMOL/L (ref 98–110)
CO2 SERPL-SCNC: 27 MMOL/L (ref 20–32)
CREAT SERPL-MCNC: 0.82 MG/DL (ref 0.5–0.97)
EGFR: 94 ML/MIN/1.73M2
GLOBULIN SER CALC-MCNC: 2.3 G/DL (CALC) (ref 1.9–3.7)
GLUCOSE SERPL-MCNC: 98 MG/DL (ref 65–99)
POTASSIUM SERPL-SCNC: 4.6 MMOL/L (ref 3.5–5.3)
PROT SERPL-MCNC: 6.5 G/DL (ref 6.1–8.1)
SODIUM SERPL-SCNC: 138 MMOL/L (ref 135–146)
T4 FREE SERPL-MCNC: 1.3 NG/DL (ref 0.8–1.8)
TSH SERPL-ACNC: 2.75 MIU/L

## 2023-10-11 ENCOUNTER — PATIENT MESSAGE (OUTPATIENT)
Dept: PRIMARY CARE CLINIC | Facility: CLINIC | Age: 38
End: 2023-10-11
Payer: COMMERCIAL

## 2023-10-11 NOTE — TELEPHONE ENCOUNTER
I would recommend patient come in to clinic to be seen so we can further evaluate.     Thanks,    Augusta Leonard NP

## 2023-10-20 ENCOUNTER — TELEPHONE (OUTPATIENT)
Dept: PRIMARY CARE CLINIC | Facility: CLINIC | Age: 38
End: 2023-10-20
Payer: COMMERCIAL

## 2023-10-20 DIAGNOSIS — F41.9 ANXIETY: ICD-10-CM

## 2023-10-20 DIAGNOSIS — F33.0 MILD RECURRENT MAJOR DEPRESSION: Primary | ICD-10-CM

## 2023-10-20 RX ORDER — ESCITALOPRAM OXALATE 10 MG/1
10 TABLET ORAL DAILY
Qty: 30 TABLET | Refills: 11 | Status: SHIPPED | OUTPATIENT
Start: 2023-10-20 | End: 2023-12-29

## 2023-10-20 NOTE — TELEPHONE ENCOUNTER
----- Message from Precious Isbell sent at 10/20/2023  1:55 PM CDT -----  Contact: Self/[phone]  [email or web address]     Patient is calling for Medical Advice regarding:pt has a question about being put on an antidepressant       Would like response via [employer]: Would like a return call     Comments: pt stated that she Is calling in regards to she is going through a separation with her spouse, pt stated that the therapy is not helping her, pt stated that she has went from 150pds down to 123pds and she is upset and tired of crying and feeling unwanted , pt stated that she does not have any intentions on harming herself but she would like to Dr Parker would prescribe her antidepressant to help her deal with her situation, pt stated that she has been dealing with this since 10/1 pt only wants to speak with Dr Parker's nurse no outside or triage nurse . Please advise

## 2023-10-20 NOTE — TELEPHONE ENCOUNTER
Sending in lexapro to try.  Low dose, can increase if tolerating well.  Please schedule virtual with me in the near future.

## 2023-11-14 ENCOUNTER — HOSPITAL ENCOUNTER (EMERGENCY)
Facility: HOSPITAL | Age: 38
Discharge: HOME | End: 2023-11-14
Attending: EMERGENCY MEDICINE
Payer: COMMERCIAL

## 2023-11-14 VITALS
HEART RATE: 58 BPM | RESPIRATION RATE: 18 BRPM | OXYGEN SATURATION: 100 % | TEMPERATURE: 99 F | DIASTOLIC BLOOD PRESSURE: 82 MMHG | SYSTOLIC BLOOD PRESSURE: 128 MMHG

## 2023-11-14 DIAGNOSIS — F43.0 STRESS REACTION: Primary | ICD-10-CM

## 2023-11-14 PROCEDURE — 99281 EMR DPT VST MAYX REQ PHY/QHP: CPT

## 2023-11-15 NOTE — ED NOTES
Belongings  1 cardigan  1 pair of red pants  White crocs  Gray shirt  Purse in closet labeled (MAMA)    Valuables  $11.70  Phone    Wallet  Check book   Banking card  SS card  Keys   Id  2 insurance cards

## 2023-11-15 NOTE — ED NOTES
"Megha Machado, a 38 y.o. female presents to the ED w/ complaint of Suicidal    Pt arrived EMS following a facebook post that she made stating "death sounds good right now". Pt states she made the post following a breakup of a 20yr relationship. Denies SI/HI    Triage note:  Chief Complaint   Patient presents with    Suicidal     Patient with SI recently broke up with long term boyfriend of 20 years and posted on FB "Death sounds good right now", denies SI/HI/VH/AH.     Review of patient's allergies indicates:   Allergen Reactions    Adhesive      tape    Penicillins      No past medical history on file.      Patient identifiers for Megha Machado checked and correct.    LOC: The patient is awake, alert and aware of environment with an appropriate affect, the patient is oriented x 4 and speaking appropriately.    APPEARANCE: Patient resting comfortably and in no acute distress, patient is clean and well groomed, patient's clothing is properly fastened.    SKIN: The skin is warm and dry, color consistent with ethnicity, patient has normal skin turgor and moist mucus membranes, skin intact, no breakdown or bruising noted.    MUSCULOSKELETAL: Patient moving all extremities well, no obvious swelling or deformities noted.    RESPIRATORY: Airway is open and patent, respirations are spontaneous and even, patient has a normal effort and rate.    CARDIAC: Patient has a normal rate and rhythm, no periphreal edema noted, capillary refill < 3 seconds.    ABDOMEN: Soft and non tender to palpation, no distention noted. Patient denies any nausea, vomiting, diarrhea, or constipation.     NEUROLOGIC: Eyes open spontaneously, PERRL, behavior appropriate to situation, follows commands, facial expression symmetrical, bilateral hand grasp equal and even, purposeful motor response noted, normal sensation in all extremities.     HEENT: No abnormalities noted. White sclera and pupils equal round and reactive to light. Denies headache, " dizziness.     : Pt voids independently, denies dysuria, hematuria, frequency.

## 2023-11-15 NOTE — ED PROVIDER NOTES
"Encounter Date: 2023       History     Chief Complaint   Patient presents with    Suicidal     Patient with SI recently broke up with long term boyfriend of 20 years and posted on FB "Death sounds good right now", denies SI/HI/VH/AH.     39 y/o F presents to the ED for psych eval.  She reports her  of 20 years told her this evening that he was leaving her for another woman. She saw him get into his truck on the home cameras. After seeing him leaving, she posted on facebook "Death sounds good right now" and tagged him in the post.  She states she made this post only to hurt him and she had no intention of harming herself. After the post, she turned her phone off. Someone was worried and called the police - they brought her to the ED. She is remorseful for the post, states it was a stupid post that was made in an attempt to hurt him. She does not have any plan for SI. She has 3 children at home and would never do anything to hurt herself. She was recently started on lexapro but reports that it made her nauseated so she stopped taking it. No history of inpatient psych hospitalization.     The history is provided by the patient.     Review of patient's allergies indicates:   Allergen Reactions    Adhesive      tape    Penicillins      No past medical history on file.  Past Surgical History:   Procedure Laterality Date     SECTION  06, 08, 16    TUBAL LIGATION  16     No family history on file.  Social History     Tobacco Use    Smoking status: Never    Smokeless tobacco: Never   Substance Use Topics    Alcohol use: Yes     Alcohol/week: 1.0 standard drink of alcohol     Types: 1 Glasses of wine per week    Drug use: Never     Review of Systems    Physical Exam     Initial Vitals [23 1905]   BP Pulse Resp Temp SpO2   128/82 (!) 58 18 98.8 °F (37.1 °C) 100 %      MAP       --         Physical Exam    Nursing note and vitals reviewed.  Constitutional: She appears " well-developed and well-nourished. She is not diaphoretic. No distress.   Pulmonary/Chest: No respiratory distress.   Musculoskeletal:         General: Normal range of motion.     Neurological: She is alert and oriented to person, place, and time.   Skin: Skin is warm and dry.   Psychiatric: Her speech is normal and behavior is normal. Thought content is not paranoid. Cognition and memory are not impaired. She expresses no homicidal and no suicidal ideation.   Tearful         ED Course   Procedures  Labs Reviewed - No data to display       Imaging Results    None          Medications - No data to display  Medical Decision Making  Chart review:  10/20 messaged PCP and started on Lexapro         APC / Resident Notes:   37 y/o F presents to the ED for psych eval.  VSS. She is well appearing. No distress. Tearful. Posted on facebook in retaliation for her  stating he was leaving her for another woman. She had no plan for SI and denies any current SI. States she knows the post was stupid and has since deleted it. She has no intention of hurting herself or anyone else and was only trying to make her spouse upset. She has 3 children at home that she cares for and would never do anything to harm herself. No prior history of psych hospitalization. No history of drug use.     I do not think that she requires PEC at this time. She reports that she knows the post was not smart and did not have any intention to harm herself. She has good support at home. She does have a crisis number and has a therapist that she speaks to.     Dr Monreal also evaluated the patient in the ED and agrees, no indication for PEC at this time. I do not feel that she needs any further labs or imaging at this time. Stable for discharge.     She was discharged without any new prescriptions.  She will follow up with her PCP.  Strict ED return precautions given.  All of the patient's questions were answered.  I reviewed the patient's chart and  discussed the case with my supervising physician.         ED Course as of 11/14/23 2049 Tue Nov 14, 2023 1957 I evaluated the patient at the bedside.  She is tearful and remorseful for her actions.  She denies suicidality.  I am not concerned about increased suicidality in the setting of recent antidepressant initiation as she is not been taking Lexapro due to induced vomiting.  Presentation is consistent with acute stress reaction.  Patient does not have a history of substance abuse.  She is good social support.  She is not have access to firearms.  I do not believe that she requires involuntary psychiatric admission.  The patient showed me her crisis therapist number that she has in her phone.  It was available 247.  She has been seeing a therapist to include today.  We have a crisis plan in place him which the patient will call her crisis line if she experiences any thoughts of self-harm or hopelessness, additionally she knows she can call 911 she feels she needs urgent evaluation.  I counseled her to continue her therapy and to follow up with her primary care for prescription of a different SNRI or SSRI [DS]      ED Course User Index  [DS] Sessions, Tito RAINES MD                    Clinical Impression:   Final diagnoses:  [F43.0] Stress reaction (Primary)        ED Disposition Condition    Discharge Stable          ED Prescriptions    None       Follow-up Information       Follow up With Specialties Details Why Contact Info Additional Information    [facility] - Psych [address] Psychiatry   [address]  [facility] [id number]  [phone] [facility] 4th Floor, [address] Please park in [facility] and use [facility] Medical Office elevator             Karely Ledbetter PA-C  11/14/23 2049

## 2023-12-06 ENCOUNTER — PATIENT MESSAGE (OUTPATIENT)
Dept: PRIMARY CARE CLINIC | Facility: CLINIC | Age: 38
End: 2023-12-06
Payer: COMMERCIAL

## 2023-12-07 NOTE — TELEPHONE ENCOUNTER
Needs to be seen in person to further evaluate. Would advise ER for chest pain and left extremity numbness with fainting spell, if defers still needs to be seen in person.

## 2023-12-29 ENCOUNTER — LAB VISIT (OUTPATIENT)
Dept: LAB | Facility: HOSPITAL | Age: 38
End: 2023-12-29
Attending: NURSE PRACTITIONER
Payer: COMMERCIAL

## 2023-12-29 ENCOUNTER — OFFICE VISIT (OUTPATIENT)
Dept: PRIMARY CARE CLINIC | Facility: CLINIC | Age: 38
End: 2023-12-29
Payer: COMMERCIAL

## 2023-12-29 VITALS
BODY MASS INDEX: 26.12 KG/M2 | OXYGEN SATURATION: 98 % | WEIGHT: 138.25 LBS | SYSTOLIC BLOOD PRESSURE: 122 MMHG | DIASTOLIC BLOOD PRESSURE: 70 MMHG | HEART RATE: 54 BPM

## 2023-12-29 DIAGNOSIS — R00.1 SINUS BRADYCARDIA: ICD-10-CM

## 2023-12-29 DIAGNOSIS — E03.9 HYPOTHYROIDISM (ACQUIRED): ICD-10-CM

## 2023-12-29 DIAGNOSIS — R07.9 CHEST PAIN, UNSPECIFIED TYPE: ICD-10-CM

## 2023-12-29 DIAGNOSIS — R07.9 CHEST PAIN, UNSPECIFIED TYPE: Primary | ICD-10-CM

## 2023-12-29 LAB
ANION GAP SERPL CALC-SCNC: 9 MMOL/L (ref 8–16)
BASOPHILS # BLD AUTO: 0.1 K/UL (ref 0–0.2)
BASOPHILS NFR BLD: 1.3 % (ref 0–1.9)
BNP SERPL-MCNC: 33 PG/ML (ref 0–99)
BUN SERPL-MCNC: 10 MG/DL (ref 6–20)
CALCIUM SERPL-MCNC: 9.5 MG/DL (ref 8.7–10.5)
CHLORIDE SERPL-SCNC: 105 MMOL/L (ref 95–110)
CO2 SERPL-SCNC: 23 MMOL/L (ref 23–29)
CREAT SERPL-MCNC: 0.8 MG/DL (ref 0.5–1.4)
DIFFERENTIAL METHOD BLD: NORMAL
EOSINOPHIL # BLD AUTO: 0.4 K/UL (ref 0–0.5)
EOSINOPHIL NFR BLD: 4.4 % (ref 0–8)
ERYTHROCYTE [DISTWIDTH] IN BLOOD BY AUTOMATED COUNT: 13 % (ref 11.5–14.5)
EST. GFR  (NO RACE VARIABLE): >60 ML/MIN/1.73 M^2
FERRITIN SERPL-MCNC: 55 NG/ML (ref 20–300)
GLUCOSE SERPL-MCNC: 87 MG/DL (ref 70–110)
HCT VFR BLD AUTO: 44 % (ref 37–48.5)
HGB BLD-MCNC: 14.8 G/DL (ref 12–16)
IMM GRANULOCYTES # BLD AUTO: 0.03 K/UL (ref 0–0.04)
IMM GRANULOCYTES NFR BLD AUTO: 0.4 % (ref 0–0.5)
IRON SERPL-MCNC: 74 UG/DL (ref 30–160)
LYMPHOCYTES # BLD AUTO: 2.4 K/UL (ref 1–4.8)
LYMPHOCYTES NFR BLD: 29.9 % (ref 18–48)
MCH RBC QN AUTO: 29.7 PG (ref 27–31)
MCHC RBC AUTO-ENTMCNC: 33.6 G/DL (ref 32–36)
MCV RBC AUTO: 88 FL (ref 82–98)
MONOCYTES # BLD AUTO: 0.8 K/UL (ref 0.3–1)
MONOCYTES NFR BLD: 9.8 % (ref 4–15)
NEUTROPHILS # BLD AUTO: 4.3 K/UL (ref 1.8–7.7)
NEUTROPHILS NFR BLD: 54.2 % (ref 38–73)
NRBC BLD-RTO: 0 /100 WBC
PLATELET # BLD AUTO: 253 K/UL (ref 150–450)
PMV BLD AUTO: 11.5 FL (ref 9.2–12.9)
POTASSIUM SERPL-SCNC: 4.2 MMOL/L (ref 3.5–5.1)
RBC # BLD AUTO: 4.98 M/UL (ref 4–5.4)
SATURATED IRON: 22 % (ref 20–50)
SODIUM SERPL-SCNC: 137 MMOL/L (ref 136–145)
T4 FREE SERPL-MCNC: 0.9 NG/DL (ref 0.71–1.51)
TOTAL IRON BINDING CAPACITY: 330 UG/DL (ref 250–450)
TRANSFERRIN SERPL-MCNC: 223 MG/DL (ref 200–375)
TSH SERPL DL<=0.005 MIU/L-ACNC: 2.72 UIU/ML (ref 0.4–4)
WBC # BLD AUTO: 7.92 K/UL (ref 3.9–12.7)

## 2023-12-29 PROCEDURE — 36415 COLL VENOUS BLD VENIPUNCTURE: CPT | Performed by: NURSE PRACTITIONER

## 2023-12-29 PROCEDURE — 84443 ASSAY THYROID STIM HORMONE: CPT | Performed by: NURSE PRACTITIONER

## 2023-12-29 PROCEDURE — 3078F DIAST BP <80 MM HG: CPT | Mod: CPTII,S$GLB,, | Performed by: NURSE PRACTITIONER

## 2023-12-29 PROCEDURE — 80048 BASIC METABOLIC PNL TOTAL CA: CPT | Performed by: NURSE PRACTITIONER

## 2023-12-29 PROCEDURE — 93005 ELECTROCARDIOGRAM TRACING: CPT | Mod: S$GLB,,, | Performed by: NURSE PRACTITIONER

## 2023-12-29 PROCEDURE — 83880 ASSAY OF NATRIURETIC PEPTIDE: CPT | Performed by: NURSE PRACTITIONER

## 2023-12-29 PROCEDURE — 85025 COMPLETE CBC W/AUTO DIFF WBC: CPT | Performed by: NURSE PRACTITIONER

## 2023-12-29 PROCEDURE — 83540 ASSAY OF IRON: CPT | Performed by: NURSE PRACTITIONER

## 2023-12-29 PROCEDURE — 93010 ELECTROCARDIOGRAM REPORT: CPT | Mod: S$GLB,,, | Performed by: INTERNAL MEDICINE

## 2023-12-29 PROCEDURE — 3074F SYST BP LT 130 MM HG: CPT | Mod: CPTII,S$GLB,, | Performed by: NURSE PRACTITIONER

## 2023-12-29 PROCEDURE — 1159F MED LIST DOCD IN RCRD: CPT | Mod: CPTII,S$GLB,, | Performed by: NURSE PRACTITIONER

## 2023-12-29 PROCEDURE — 84439 ASSAY OF FREE THYROXINE: CPT | Performed by: NURSE PRACTITIONER

## 2023-12-29 PROCEDURE — 99999 PR PBB SHADOW E&M-EST. PATIENT-LVL IV: CPT | Mod: PBBFAC,,, | Performed by: NURSE PRACTITIONER

## 2023-12-29 PROCEDURE — 82728 ASSAY OF FERRITIN: CPT | Performed by: NURSE PRACTITIONER

## 2023-12-29 PROCEDURE — 99214 OFFICE O/P EST MOD 30 MIN: CPT | Mod: S$GLB,,, | Performed by: NURSE PRACTITIONER

## 2023-12-29 PROCEDURE — 3008F BODY MASS INDEX DOCD: CPT | Mod: CPTII,S$GLB,, | Performed by: NURSE PRACTITIONER

## 2023-12-29 NOTE — PROGRESS NOTES
[facility] Note    Chief Complaint      Chief Complaint   Patient presents with    Numbness    Chest Pain       History of Present Illness       Megha Machado is a 38 y.o. female with chronic conditions of hypothyroidism, obesity, peripheral edema who presents today for: started with chest pain waking her up out of sleep, more so left side, describes numbness in left hand and left foot. Constantly cold. Does endorse shortness of breath with the chest pain X2 months. Has had emotional stress,  has been having emotional affair. Does not happen every night.   No chest pain or shortness of breath with exercise.   No leg swelling or pain.   HR 50s at home per patient  Nonsmoker.   No history of asthma.   Biological mothers have CHF.     HLD: borderline.   Anxiety: did not tolerate lexapro, made her violently sick.     Past Medical History:  History reviewed. No pertinent past medical history.    Past Surgical History:   has a past surgical history that includes  section (06, 08, 16) and Tubal ligation (16).    Family History:  family history is not on file.     Social History:  Social History     Tobacco Use    Smoking status: Never    Smokeless tobacco: Never   Substance Use Topics    Alcohol use: Yes     Alcohol/week: 1.0 standard drink of alcohol     Types: 1 Glasses of wine per week    Drug use: Never       Review of Systems   Constitutional:  Negative for chills and fever.   HENT:  Negative for hearing loss.    Eyes:  Negative for discharge.   Respiratory:  Negative for cough, shortness of breath and wheezing.    Cardiovascular:  Positive for chest pain. Negative for palpitations.   Gastrointestinal:  Negative for blood in stool, constipation, diarrhea, nausea and vomiting.   Genitourinary:  Negative for dysuria and hematuria.   Musculoskeletal:  Negative for falls and neck pain.   Neurological:  Positive for tingling. Negative for weakness and headaches.    Endo/Heme/Allergies:  Negative for polydipsia.        Medications:  Outpatient Encounter Medications as of 12/29/2023   Medication Sig Dispense Refill    levothyroxine (SYNTHROID) 112 MCG tablet Take 1 tablet (112 mcg total) by mouth before breakfast. 90 tablet 3    LO LOESTRIN FE 1 mg-10 mcg (24)/10 mcg (2) Tab Take 1 tablet by mouth.      meloxicam (MOBIC) 15 MG tablet TAKE 1 TABLET(15 MG) BY MOUTH EVERY DAY 30 tablet 0    ondansetron (ZOFRAN-ODT) 4 MG TbDL Take 1 tablet (4 mg total) by mouth every 6 (six) hours as needed (Nausea or vomiting). 15 tablet 0    pantoprazole (PROTONIX) 40 MG tablet Take 1 tablet (40 mg total) by mouth once daily. (Patient taking differently: Take 40 mg by mouth as needed.) 30 tablet 11    triamterene-hydrochlorothiazide 37.5-25 mg (MAXZIDE-25) 37.5-25 mg per tablet Take 1 tablet by mouth once daily. (Patient taking differently: Take 1 tablet by mouth as needed.) 30 tablet 11    [DISCONTINUED] EScitalopram oxalate (LEXAPRO) 10 MG tablet Take 1 tablet (10 mg total) by mouth once daily. (Patient not taking: Reported on 12/29/2023) 30 tablet 11     No facility-administered encounter medications on file as of 12/29/2023.       Allergies:  Review of patient's allergies indicates:   Allergen Reactions    Adhesive      tape    Penicillins        Health Maintenance:  Immunization History   Administered Date(s) Administered    COVID-19, MRNA, LN-S, PF (Pfizer) (Purple Cap) 08/05/2021, 09/23/2021    Influenza 02/12/2020      Health Maintenance   Topic Date Due    TETANUS VACCINE  Never done    Hepatitis C Screening  Completed    Lipid Panel  Completed        Physical Exam      Vital Signs  Pulse: (!) 54  SpO2: 98 %  BP: 122/70  BP Location: Left arm  Pain Score:   2  Height and Weight  Weight: 62.7 kg (138 lb 3.7 oz)]    Physical Exam  Constitutional:       Appearance: She is well-developed.   HENT:      Head: Normocephalic and atraumatic.   Eyes:      Pupils: Pupils are equal, round, and  reactive to light.   Neck:      Vascular: No carotid bruit.   Cardiovascular:      Rate and Rhythm: Normal rate and regular rhythm.      Heart sounds: Normal heart sounds. No murmur heard.  Pulmonary:      Effort: Pulmonary effort is normal. No respiratory distress.      Breath sounds: Normal breath sounds.   Abdominal:      General: There is no distension.      Palpations: Abdomen is soft.      Tenderness: There is no abdominal tenderness. There is no guarding.   Musculoskeletal:         General: No tenderness. Normal range of motion.      Cervical back: Normal range of motion.      Right lower leg: No edema.      Left lower leg: No edema.   Skin:     General: Skin is warm and dry.   Neurological:      General: No focal deficit present.      Mental Status: She is alert. Mental status is at baseline.   Psychiatric:         Behavior: Behavior normal.          Laboratory:  CBC:  Recent Labs   Lab 09/06/22  1047 10/06/22  0844 02/22/23  0726   WBC 7.46 7.82 6.4   RBC 4.93 5.03 4.91   Hemoglobin 14.4 14.6 14.2   Hematocrit 42.8 44.0 42.9   Platelets 289 301 287   MCV 87 88 87.4   MCH 29.2 29.0 28.9   MCHC 33.6 33.2 33.1     CMP:  Recent Labs   Lab 10/06/22  0844 02/22/23  0726 09/12/23  0800   Glucose 104 96 98   Calcium 9.0 9.0 8.9   Albumin 4.1 4.0 4.2   Total Protein 7.3 6.6 6.5   Sodium 138 139 138   Potassium 4.2 4.5 4.6   CO2 25 24 27   Chloride 106 106 104   BUN 10 14 12   Alkaline Phosphatase 92  --   --    ALT 19 19 14   AST 17 15 14   Total Bilirubin 0.4 0.4 0.5     URINALYSIS:  Recent Labs   Lab 09/06/22  1221   Color, UA Yellow   Specific Gravity, UA 1.010   pH, UA 7.0   Protein, UA Negative   Bacteria Many A   Nitrite, UA Negative   Leukocytes, UA 2+ A   Urobilinogen, UA Negative      LIPIDS:  Recent Labs   Lab 02/18/22  0805 08/15/22  0739 08/23/22  0853 02/22/23  0726 05/23/23  0802 09/12/23  0800   TSH  --  21.81 H   < > 0.59 0.11 L 2.75   HDL 50 52  --  52  --   --    Cholesterol 252 H 239 H  --  223 H   --   --    Triglycerides 146 121  --  139  --   --    LDL Cholesterol 173 H 163 H  --  145 H  --   --    HDL/Cholesterol Ratio 5.0 H 4.6  --  4.3  --   --    Non HDL Chol. (LDL+VLDL) 202 H 187 H  --  171 H  --   --     < > = values in this interval not displayed.     TSH:  Recent Labs   Lab 02/22/23  0726 05/23/23  0802 09/12/23  0800   TSH 0.59 0.11 L 2.75     A1C:        Assessment/Plan     Megha Machado is a 38 y.o.female with:    1. Chest pain, unspecified type  - CBC W/ AUTO DIFFERENTIAL; Future  - Iron and TIBC; Future  - Ferritin; Future  - Ambulatory referral/consult to Cardiology; Future  - IN OFFICE EKG 12-LEAD (to Muse)    2. Hypothyroidism (acquired)  - TSH; Future  - T4, FREE; Future     Instructed to go to ER if chest pain or sob worsens    Chronic conditions status updated as per HPI.  Other than changes above, cont current medications and maintain follow up with specialists.  No follow-ups on file.    No future appointments.    Adreinne Cotaya, FNP [facility]

## 2024-01-02 ENCOUNTER — HOSPITAL ENCOUNTER (OUTPATIENT)
Dept: CARDIOLOGY | Facility: HOSPITAL | Age: 39
Discharge: HOME | End: 2024-01-02
Attending: NURSE PRACTITIONER
Payer: COMMERCIAL

## 2024-01-02 VITALS
SYSTOLIC BLOOD PRESSURE: 138 MMHG | HEART RATE: 60 BPM | HEIGHT: 61 IN | BODY MASS INDEX: 26.06 KG/M2 | WEIGHT: 138 LBS | DIASTOLIC BLOOD PRESSURE: 87 MMHG

## 2024-01-02 DIAGNOSIS — R00.1 SINUS BRADYCARDIA: ICD-10-CM

## 2024-01-02 DIAGNOSIS — R07.9 CHEST PAIN, UNSPECIFIED TYPE: ICD-10-CM

## 2024-01-02 PROCEDURE — 93306 TTE W/DOPPLER COMPLETE: CPT | Mod: 26,,, | Performed by: INTERNAL MEDICINE

## 2024-01-02 PROCEDURE — 93306 TTE W/DOPPLER COMPLETE: CPT

## 2024-01-03 DIAGNOSIS — R07.9 CHEST PAIN, UNSPECIFIED TYPE: Primary | ICD-10-CM

## 2024-01-03 LAB
ASCENDING AORTA: 2.55 CM
AV INDEX (PROSTH): 0.78
AV MEAN GRADIENT: 4 MMHG
AV PEAK GRADIENT: 7 MMHG
AV VALVE AREA BY VELOCITY RATIO: 2.23 CM²
AV VALVE AREA: 2.06 CM²
AV VELOCITY RATIO: 0.85
BSA FOR ECHO PROCEDURE: 1.64 M2
CV ECHO LV RWT: 0.32 CM
DOP CALC AO PEAK VEL: 1.31 M/S
DOP CALC AO VTI: 29.46 CM
DOP CALC LVOT AREA: 2.6 CM2
DOP CALC LVOT DIAMETER: 1.83 CM
DOP CALC LVOT PEAK VEL: 1.11 M/S
DOP CALC LVOT STROKE VOLUME: 60.78 CM3
DOP CALCLVOT PEAK VEL VTI: 23.12 CM
E WAVE DECELERATION TIME: 150.72 MSEC
E/A RATIO: 1.36
E/E' RATIO: 6.28 M/S
ECHO LV POSTERIOR WALL: 0.72 CM (ref 0.6–1.1)
FRACTIONAL SHORTENING: 35 % (ref 28–44)
INTERVENTRICULAR SEPTUM: 0.62 CM (ref 0.6–1.1)
LA MAJOR: 4.29 CM
LA MINOR: 4.35 CM
LA WIDTH: 3.44 CM
LEFT ATRIUM SIZE: 2.87 CM
LEFT ATRIUM VOLUME INDEX MOD: 19.9 ML/M2
LEFT ATRIUM VOLUME INDEX: 22.5 ML/M2
LEFT ATRIUM VOLUME MOD: 32.1 CM3
LEFT ATRIUM VOLUME: 36.25 CM3
LEFT INTERNAL DIMENSION IN SYSTOLE: 2.91 CM (ref 2.1–4)
LEFT VENTRICLE DIASTOLIC VOLUME INDEX: 56.53 ML/M2
LEFT VENTRICLE DIASTOLIC VOLUME: 91.02 ML
LEFT VENTRICLE MASS INDEX: 56 G/M2
LEFT VENTRICLE SYSTOLIC VOLUME INDEX: 20.1 ML/M2
LEFT VENTRICLE SYSTOLIC VOLUME: 32.35 ML
LEFT VENTRICULAR INTERNAL DIMENSION IN DIASTOLE: 4.47 CM (ref 3.5–6)
LEFT VENTRICULAR MASS: 89.46 G
LV LATERAL E/E' RATIO: 5.35 M/S
LV SEPTAL E/E' RATIO: 7.58 M/S
MV A" WAVE DURATION": 10.28 MSEC
MV PEAK A VEL: 0.67 M/S
MV PEAK E VEL: 0.91 M/S
MV STENOSIS PRESSURE HALF TIME: 43.71 MS
MV VALVE AREA P 1/2 METHOD: 5.03 CM2
PISA TR MAX VEL: 1.96 M/S
PULM VEIN S/D RATIO: 0.95
PV PEAK D VEL: 0.62 M/S
PV PEAK S VEL: 0.59 M/S
RA MAJOR: 4.11 CM
RA PRESSURE ESTIMATED: 3 MMHG
RA WIDTH: 3.3 CM
RIGHT VENTRICULAR END-DIASTOLIC DIMENSION: 3.57 CM
RV TB RVSP: 5 MMHG
SINUS: 2.5 CM
STJ: 2.21 CM
TDI LATERAL: 0.17 M/S
TDI SEPTAL: 0.12 M/S
TDI: 0.15 M/S
TR MAX PG: 15 MMHG
TV REST PULMONARY ARTERY PRESSURE: 18 MMHG
Z-SCORE OF LEFT VENTRICULAR DIMENSION IN END DIASTOLE: -0.22
Z-SCORE OF LEFT VENTRICULAR DIMENSION IN END SYSTOLE: 0.21

## 2024-01-09 ENCOUNTER — OFFICE VISIT (OUTPATIENT)
Dept: CARDIOLOGY | Facility: CLINIC | Age: 39
End: 2024-01-09
Payer: COMMERCIAL

## 2024-01-09 VITALS
DIASTOLIC BLOOD PRESSURE: 84 MMHG | BODY MASS INDEX: 25.97 KG/M2 | HEART RATE: 69 BPM | SYSTOLIC BLOOD PRESSURE: 126 MMHG | WEIGHT: 137.56 LBS | HEIGHT: 61 IN

## 2024-01-09 DIAGNOSIS — Z13.6 ENCOUNTER FOR SCREENING FOR CARDIOVASCULAR DISORDERS: ICD-10-CM

## 2024-01-09 DIAGNOSIS — R07.9 CHEST PAIN, UNSPECIFIED TYPE: Primary | ICD-10-CM

## 2024-01-09 DIAGNOSIS — E66.9 OBESITY, UNSPECIFIED CLASSIFICATION, UNSPECIFIED OBESITY TYPE, UNSPECIFIED WHETHER SERIOUS COMORBIDITY PRESENT: ICD-10-CM

## 2024-01-09 PROBLEM — R60.0 PERIPHERAL EDEMA: Status: RESOLVED | Noted: 2020-08-05 | Resolved: 2024-01-09

## 2024-01-09 PROCEDURE — 3074F SYST BP LT 130 MM HG: CPT | Mod: CPTII,S$GLB,, | Performed by: INTERNAL MEDICINE

## 2024-01-09 PROCEDURE — 3008F BODY MASS INDEX DOCD: CPT | Mod: CPTII,S$GLB,, | Performed by: INTERNAL MEDICINE

## 2024-01-09 PROCEDURE — 3079F DIAST BP 80-89 MM HG: CPT | Mod: CPTII,S$GLB,, | Performed by: INTERNAL MEDICINE

## 2024-01-09 PROCEDURE — 99999 PR PBB SHADOW E&M-EST. PATIENT-LVL III: CPT | Mod: PBBFAC,,, | Performed by: INTERNAL MEDICINE

## 2024-01-09 PROCEDURE — 1159F MED LIST DOCD IN RCRD: CPT | Mod: CPTII,S$GLB,, | Performed by: INTERNAL MEDICINE

## 2024-01-09 PROCEDURE — 1160F RVW MEDS BY RX/DR IN RCRD: CPT | Mod: CPTII,S$GLB,, | Performed by: INTERNAL MEDICINE

## 2024-01-09 PROCEDURE — 99204 OFFICE O/P NEW MOD 45 MIN: CPT | Mod: S$GLB,,, | Performed by: INTERNAL MEDICINE

## 2024-01-09 NOTE — PROGRESS NOTES
HISTORY:    38-year-old female with a history of hypertension, hypothyroidism, obesity presenting for initial evaluation by me.      Started having chest discomfort a few months ago in the setting of life stressors. Going through difficult times with her .    Occurs a few times/week. Lasts 15-20 minutes at a time. Can be any time of the day. Has woken her up in the middle of night.     Patient exercises- runs, jogs, walks and lifts weights. Has lost 65 pounds over the last year through diet and exercise. No CV limitations.     PHYSICAL EXAM:    Vitals:    01/09/24 0856   BP: 126/84   Pulse: 69       NAD, A+Ox3.  No jvd, no bruit.  RRR nml s1,s2. No murmurs.  CTA B no wheezes or crackles.  No edema.    LABS/STUDIES (imaging reviewed during clinic visit):    December 2023 CBC and CMP normal.  TSH and BNP normal.   and HDL 52.  Triglycerides 139.   EKG December 2023 sinus rhythm no Q-waves or ST changes.    TTE January 2024 normal LV size and function with EF of 55-60%.  Normal diastology.  CVP 3.     ASSESSMENT & PLAN:    1. Chest pain, unspecified type    2. Obesity, unspecified classification, unspecified obesity type, unspecified whether serious comorbidity present    3. Encounter for screening for cardiovascular disorders        Orders Placed This Encounter    CT Calcium Scoring Cardiac        Likely non-cardiac CP. Likely related to stress given issues with her . Good exercise capacity and has done fantastic with weight loss over the last year.     Given FH of CV ds will proceed with CT calcium score.     Myles Rivas MD

## 2024-01-10 ENCOUNTER — HOSPITAL ENCOUNTER (OUTPATIENT)
Dept: RADIOLOGY | Facility: HOSPITAL | Age: 39
Discharge: HOME | End: 2024-01-10
Attending: INTERNAL MEDICINE
Payer: COMMERCIAL

## 2024-01-10 DIAGNOSIS — Z13.6 ENCOUNTER FOR SCREENING FOR CARDIOVASCULAR DISORDERS: ICD-10-CM

## 2024-01-10 PROCEDURE — 75571 CT HRT W/O DYE W/CA TEST: CPT | Mod: 26,,, | Performed by: STUDENT IN AN ORGANIZED HEALTH CARE EDUCATION/TRAINING PROGRAM

## 2024-01-10 PROCEDURE — 75571 CT HRT W/O DYE W/CA TEST: CPT | Mod: TC

## 2024-01-29 ENCOUNTER — HOSPITAL ENCOUNTER (EMERGENCY)
Facility: HOSPITAL | Age: 39
Discharge: HOME | End: 2024-01-29
Attending: EMERGENCY MEDICINE
Payer: COMMERCIAL

## 2024-01-29 VITALS
DIASTOLIC BLOOD PRESSURE: 72 MMHG | HEIGHT: 61 IN | RESPIRATION RATE: 18 BRPM | WEIGHT: 130 LBS | OXYGEN SATURATION: 100 % | BODY MASS INDEX: 24.55 KG/M2 | HEART RATE: 57 BPM | TEMPERATURE: 98 F | SYSTOLIC BLOOD PRESSURE: 138 MMHG

## 2024-01-29 DIAGNOSIS — R07.9 CHEST PAIN: ICD-10-CM

## 2024-01-29 LAB
ALBUMIN SERPL BCP-MCNC: 3.8 G/DL (ref 3.5–5.2)
ALP SERPL-CCNC: 74 U/L (ref 55–135)
ALT SERPL W/O P-5'-P-CCNC: 13 U/L (ref 10–44)
ANION GAP SERPL CALC-SCNC: 6 MMOL/L (ref 8–16)
AST SERPL-CCNC: 16 U/L (ref 10–40)
B-HCG UR QL: NEGATIVE
BASOPHILS # BLD AUTO: 0.07 K/UL (ref 0–0.2)
BASOPHILS NFR BLD: 1.1 % (ref 0–1.9)
BILIRUB SERPL-MCNC: 0.5 MG/DL (ref 0.1–1)
BUN SERPL-MCNC: 11 MG/DL (ref 6–20)
CALCIUM SERPL-MCNC: 8.5 MG/DL (ref 8.7–10.5)
CHLORIDE SERPL-SCNC: 107 MMOL/L (ref 95–110)
CO2 SERPL-SCNC: 25 MMOL/L (ref 23–29)
CREAT SERPL-MCNC: 0.8 MG/DL (ref 0.5–1.4)
CTP QC/QA: YES
D DIMER PPP IA.FEU-MCNC: 0.71 MG/L FEU
DIFFERENTIAL METHOD BLD: NORMAL
EOSINOPHIL # BLD AUTO: 0.3 K/UL (ref 0–0.5)
EOSINOPHIL NFR BLD: 4 % (ref 0–8)
ERYTHROCYTE [DISTWIDTH] IN BLOOD BY AUTOMATED COUNT: 12.6 % (ref 11.5–14.5)
EST. GFR  (NO RACE VARIABLE): >60 ML/MIN/1.73 M^2
GLUCOSE SERPL-MCNC: 86 MG/DL (ref 70–110)
HCT VFR BLD AUTO: 40.8 % (ref 37–48.5)
HGB BLD-MCNC: 13.6 G/DL (ref 12–16)
IMM GRANULOCYTES # BLD AUTO: 0.01 K/UL (ref 0–0.04)
IMM GRANULOCYTES NFR BLD AUTO: 0.2 % (ref 0–0.5)
LIPASE SERPL-CCNC: 28 U/L (ref 4–60)
LYMPHOCYTES # BLD AUTO: 1.8 K/UL (ref 1–4.8)
LYMPHOCYTES NFR BLD: 28.3 % (ref 18–48)
MAGNESIUM SERPL-MCNC: 2 MG/DL (ref 1.6–2.6)
MCH RBC QN AUTO: 29.2 PG (ref 27–31)
MCHC RBC AUTO-ENTMCNC: 33.3 G/DL (ref 32–36)
MCV RBC AUTO: 88 FL (ref 82–98)
MONOCYTES # BLD AUTO: 0.6 K/UL (ref 0.3–1)
MONOCYTES NFR BLD: 10 % (ref 4–15)
NEUTROPHILS # BLD AUTO: 3.5 K/UL (ref 1.8–7.7)
NEUTROPHILS NFR BLD: 56.4 % (ref 38–73)
NRBC BLD-RTO: 0 /100 WBC
PLATELET # BLD AUTO: 235 K/UL (ref 150–450)
PMV BLD AUTO: 11.1 FL (ref 9.2–12.9)
POTASSIUM SERPL-SCNC: 4 MMOL/L (ref 3.5–5.1)
PROT SERPL-MCNC: 6.6 G/DL (ref 6–8.4)
RBC # BLD AUTO: 4.66 M/UL (ref 4–5.4)
SODIUM SERPL-SCNC: 138 MMOL/L (ref 136–145)
TROPONIN I SERPL DL<=0.01 NG/ML-MCNC: <0.006 NG/ML (ref 0–0.03)
TSH SERPL DL<=0.005 MIU/L-ACNC: 2.91 UIU/ML (ref 0.4–4)
WBC # BLD AUTO: 6.28 K/UL (ref 3.9–12.7)

## 2024-01-29 PROCEDURE — 84443 ASSAY THYROID STIM HORMONE: CPT | Performed by: EMERGENCY MEDICINE

## 2024-01-29 PROCEDURE — 81025 URINE PREGNANCY TEST: CPT | Performed by: EMERGENCY MEDICINE

## 2024-01-29 PROCEDURE — 80053 COMPREHEN METABOLIC PANEL: CPT | Performed by: EMERGENCY MEDICINE

## 2024-01-29 PROCEDURE — 85379 FIBRIN DEGRADATION QUANT: CPT | Performed by: EMERGENCY MEDICINE

## 2024-01-29 PROCEDURE — 25500020 PHARM REV CODE 255: Performed by: EMERGENCY MEDICINE

## 2024-01-29 PROCEDURE — 85025 COMPLETE CBC W/AUTO DIFF WBC: CPT | Performed by: EMERGENCY MEDICINE

## 2024-01-29 PROCEDURE — 83735 ASSAY OF MAGNESIUM: CPT | Performed by: EMERGENCY MEDICINE

## 2024-01-29 PROCEDURE — 93005 ELECTROCARDIOGRAM TRACING: CPT

## 2024-01-29 PROCEDURE — 83690 ASSAY OF LIPASE: CPT | Performed by: EMERGENCY MEDICINE

## 2024-01-29 PROCEDURE — 99285 EMERGENCY DEPT VISIT HI MDM: CPT | Mod: 25

## 2024-01-29 PROCEDURE — 84484 ASSAY OF TROPONIN QUANT: CPT | Performed by: EMERGENCY MEDICINE

## 2024-01-29 PROCEDURE — 93010 ELECTROCARDIOGRAM REPORT: CPT | Mod: ,,, | Performed by: INTERNAL MEDICINE

## 2024-01-29 RX ADMIN — IOHEXOL 100 ML: 350 INJECTION, SOLUTION INTRAVENOUS at 03:01

## 2024-01-29 NOTE — DISCHARGE INSTRUCTIONS
Your CTA chest did not demonstrate any findings to suggest pulmonary embolism per the final radiology read.    Please follow up with your primary care physician.    In light of your presentation and complaints, I have placed an ambulatory referral to cardiology.

## 2024-01-29 NOTE — ED TRIAGE NOTES
"PT arrived with c/o intermittent CP to L breast and "under L armpit" x 2 months, worse the morning at about 04:30, woke her up out of her sleep.  Pt endorses SOB and tingling to L arm with the CP.  Pain described as "tight."  Took tylenol and baby ASA this AM with minimal relief.  Pt answering questions appropriately, speaking in complete sentences, respirations even and unlabored.  Aao x 4.    "

## 2024-01-29 NOTE — ED PROVIDER NOTES
"Encounter Date: 2024       History     Chief Complaint   Patient presents with    chest pain resolved     Intermittent cp x 4 months. Pain is currently resolved. Pain usually occurs at night.  Pain is located to left lateral chest and is described as "crushing"     38-year-old female presents emergency department complaining of chest pain.  Reports intermittent chest pain for several months.  Was seen a week or 2 ago by a cardiologist with a relatively benign evaluation.  However her intermittent chest pain persists.  She had some last night, states it is random when it comes.  She also had some work an hour to prior to arrival.  Denies any exertional component.  Denies any associated shortness of breath.  No other symptoms reported at this time.      Review of patient's allergies indicates:   Allergen Reactions    Adhesive      tape    Penicillins      History reviewed. No pertinent past medical history.  Past Surgical History:   Procedure Laterality Date     SECTION  06, 08, 16    D&C      TUBAL LIGATION  2016     Family History   Problem Relation Age of Onset    Heart disease Maternal Grandmother     Heart disease Paternal Grandmother      Social History     Tobacco Use    Smoking status: Never    Smokeless tobacco: Never   Substance Use Topics    Alcohol use: Yes     Alcohol/week: 1.0 standard drink of alcohol     Types: 1 Glasses of wine per week    Drug use: Never     Review of Systems   Constitutional:  Negative for chills and fever.   HENT:  Negative for congestion.    Respiratory:  Negative for cough and shortness of breath.    Cardiovascular:  Positive for chest pain.   Gastrointestinal:  Negative for abdominal pain.   Musculoskeletal:  Negative for back pain.   Neurological:  Negative for light-headedness and headaches.       Physical Exam     Initial Vitals [24 1259]   BP Pulse Resp Temp SpO2   (!) 147/80 65 18 97.8 °F (36.6 °C) 100 %      MAP       --     "     Physical Exam    Nursing note and vitals reviewed.  Constitutional: She appears well-developed and well-nourished. No distress.   HENT:   Head: Normocephalic and atraumatic.   Eyes: Conjunctivae and EOM are normal. Pupils are equal, round, and reactive to light.   Neck: Neck supple. No tracheal deviation present.   Normal range of motion.  Cardiovascular:  Normal rate and intact distal pulses.           Pulmonary/Chest: No respiratory distress.   Musculoskeletal:         General: No tenderness or edema. Normal range of motion.      Cervical back: Normal range of motion and neck supple.     Neurological: She is alert and oriented to person, place, and time. She has normal strength. No cranial nerve deficit. GCS score is 15. GCS eye subscore is 4. GCS verbal subscore is 5. GCS motor subscore is 6.   Skin: Skin is warm and dry.         ED Course   Procedures  Labs Reviewed   COMPREHENSIVE METABOLIC PANEL - Abnormal; Notable for the following components:       Result Value    Calcium 8.5 (*)     Anion Gap 6 (*)     All other components within normal limits   D DIMER, QUANTITATIVE - Abnormal; Notable for the following components:    D-Dimer 0.71 (*)     All other components within normal limits   CBC W/ AUTO DIFFERENTIAL   TROPONIN I   LIPASE   MAGNESIUM   TSH   POCT URINE PREGNANCY     EKG Readings: (Independently Interpreted)   Initial Reading: No STEMI. Previous EKG: Compared with most recent EKG Previous EKG Date: 12/29/2023 (Minimal change). Rhythm: Sinus Bradycardia. Heart Rate: 53. Ectopy: No Ectopy. ST Segments: Normal ST Segments. Axis: Normal.   EKG independently interpreted by me pending Cardiology review     ECG Results              EKG 12-lead (In process)  Result time 01/29/24 14:17:47      In process by Interface, Lab In [facility] (01/29/24 14:17:47)                   Narrative:    Test Reason : R07.9,    Vent. Rate : 053 BPM     Atrial Rate : 053 BPM     P-R Int : 168 ms          QRS Dur : 084 ms       QT Int : 384 ms       P-R-T Axes : 072 086 074 degrees     QTc Int : 360 ms    Sinus bradycardia  Otherwise normal ECG  When compared with ECG of 29-DEC-2023 11:27,  Questionable change in The axis  T wave amplitude has decreased in Lateral leads    Referred By: [email or web address]   SELF           Confirmed By:                                       X-Rays:   Independently Interpreted Readings:   Other Readings:  Chest x-ray independently interpreted by me pending radiology review:  No acute infiltrate, no pneumothorax, no effusion    Imaging Results              CTA Chest Non-Coronary (PE Studies) (Final result)  Result time 01/29/24 16:53:08      Final result by Rashard Lund MD (01/29/24 16:53:08)                   Impression:      1. No evidence of pulmonary embolism.  2. No acute abnormality.      Electronically signed by: Rashard Lund  Date:    01/29/2024  Time:    16:53               Narrative:    EXAMINATION:  CTA CHEST NON CORONARY (PE STUDIES)    CLINICAL HISTORY:  Pulmonary embolism (PE) suspected, positive D-dimer;    TECHNIQUE:  Low dose axial images, sagittal and coronal reformations were obtained from the thoracic inlet to the lung bases following the IV administration of 100 mL of Omnipaque 350.  Contrast timing was optimized to evaluate the pulmonary arteries.  MIP images were performed.    COMPARISON:  None    FINDINGS:  Pulmonary arteries:Pulmonary arteries are adequately enhanced.No filling defects to indicate pulmonary embolism.    Thoracic soft tissues: Unremarkable.    Aorta: Left-sided aortic arch.  No aneurysm or dissection.    Heart: Normal size. No effusion.    Dayami/Mediastinum: No pathologic tristen enlargement.    Airways: Patent.    Lungs/Pleura: Clear lungs. No pleural effusion or thickening.  Calcified granuloma in the right upper lobe.    Esophagus: Unremarkable.    Upper Abdomen: No abnormality of the partially imaged upper abdomen.    Bones: No acute fracture. No suspicious lytic or  sclerotic lesions.                                       X-Ray Chest AP Portable (Final result)  Result time 01/29/24 14:20:01      Final result by Cristofer Vinson MD (01/29/24 14:20:01)                   Impression:      No radiographic acute intrathoracic process seen on this single view.      Electronically signed by: Cristofer Vinson MD  Date:    01/29/2024  Time:    14:20               Narrative:    EXAMINATION:  XR CHEST AP PORTABLE    CLINICAL HISTORY:  Chest pain, unspecified    TECHNIQUE:  Single frontal view of the chest was performed.    COMPARISON:  Chest radiograph 08/20/2021, CT calcium scoring 01/10/2024    FINDINGS:  No detrimental change.  Trachea is relatively midline and patent.  Minimal biapical pleuroparenchymal scarring.The lungs are symmetrically well expanded without consolidation, pleural effusion or pneumothorax    The cardiac silhouette is normal in size. The hilar and mediastinal contours are within normal limits.    Bones are intact.  Imaged upper abdomen is within normal limits.                                      Medications   iohexoL (OMNIPAQUE 350) injection 100 mL (100 mLs Intravenous Given 1/29/24 1545)     Medical Decision Making  38-year-old female presents emergency department complaining of intermittent chest pain      Differential: ACS, dissection, pneumonia, pneumothorax musculoskeletal, GERD, pulmonary embolism      Labs fairly benign except for mildly elevated D Dimer. I've ordered a CTA Chest. Patient will be handed off to Dr. Pérez for f/u of pending CTA and reevaluation/disposition. Patient has been asymptomatic throughout her emergency department visit.     Problems Addressed:  Chest pain: chronic illness or injury    Amount and/or Complexity of Data Reviewed  External Data Reviewed: ECG and notes.     Details: Reviewed most recent EKG for comparison     Reviewed most recent cardiology note documenting baseline medications and past medical history  Labs: ordered.      Details: CBC without leukocytosis, normal H&H; CMP with normal renal and liver function tests, normal electrolytes; troponin negative; D-dimer somewhat elevated  Radiology: ordered. Decision-making details documented in ED Course.  ECG/medicine tests: ordered and independent interpretation performed. Decision-making details documented in ED Course.    Risk  OTC drugs.  Prescription drug management.  Parenteral controlled substances.                                      Clinical Impression:  Final diagnoses:  [R07.9] Chest pain          ED Disposition Condition    Discharge Stable          ED Prescriptions    None       Follow-up Information       Follow up With Specialties Details Why Contact Info    Julio Parker MD Internal Medicine Schedule an appointment as soon as possible for a visit   [address]  [place] [address]  [phone]      Kevon Gaytan MD Cardiology, Interventional Cardiology Schedule an appointment as soon as possible for a visit   [address]  [address]  [facility] [address]  [phone]               Con Mendoza MD  01/30/24 0603

## 2024-01-29 NOTE — ED PROVIDER NOTES
==== ASSUMPTION OF CARE NOTE ====    Care of patient assumed by self, from Dr. Pasha Mendoza, as of shift change.      Patient presents with several months of intermittent chest pain.  Initial workup including EKG, troponin unremarkable for acute abnormality.  Patient's D-dimer was mildly elevated so the decision was made to order CTA of the chest to rule out or rule in a pulmonary embolism or other intrathoracic pathology.    CTA PE protocol demonstrates no acute abnormality per the final radiology read.    I discussed the ED workup results, at length, with the patient who verbalized understanding.  I do feel the patient would benefit from follow up with Cardiology as an outpatient for possible additional testing at this time.    Patient verbalized understanding of this.  She is comfortable plan for discharge and outpatient follow-up as indicated above.    No further intervention is indicated at this time after having taken into account the patient's history, physical exam findings, and empirical and objective data obtained during the patient's emergency department workup.     The patient is at low risk for an emergent medical condition at this time, and I am of the belief that that it is safe to discharge the patient from the emergency department.     The patient is instructed to follow up as outpatient as indicated on the discharge paperwork.      I have discussed the specifics of the workup with the patient and the patient has verbalized understanding of the details of the workup, the diagnosis, the treatment plan, and the need for outpatient follow-up.      Although the patient has no emergent etiology today this does not preclude the development of an emergent condition so, in addition, I have advised the patient that they can return to the ED and/or activate EMS at any time with worsening of their symptoms, change of their symptoms, or with any other medical complaint.      The patient remained comfortable and  stable during their visit in the ED.      Discharge and follow-up instructions discussed with the patient who expressed understanding and willingness to comply with my recommendations.    Results of all emergency department tests  discussed thoroughly with patient; all patient questions answered; pt in agreement with plan    Pt instructed to follow up with PCP in 2-3 days for recheck of today's complaints    Pt given strict emergency department return precautions for any new or worsening of symptoms    Pt discharged from the emergency department in stable condition, in no acute distress.      Shamar Pérez MD, FACEP   Emergency Medicine       Shamar Pérez MD  01/29/24 9073

## 2024-02-05 ENCOUNTER — TELEPHONE (OUTPATIENT)
Dept: CARDIOLOGY | Facility: CLINIC | Age: 39
End: 2024-02-05
Payer: COMMERCIAL

## 2024-02-05 NOTE — TELEPHONE ENCOUNTER
Pt on Dr. Gomez schedule for 2/6/2024 and saw Dr. Rivas 1/9/2024 for Chest Pain;  Pt stated she has CP that wakes her from her sleep and while relaxing;   Pt stated was told CP is stress related;  Reports CP during 3 pregnancies;   After going to ER, was told to seek 2nd opinion;   Scheduled virtual appt with Dr. Gomez;  Advised pt 2nd opinion is not done within the office with the 2 physicians;  Offered pt an appointment with Dr. Rivas to further discuss CP, pt refused and stated she does not believe her CP is stress;  Pt will look for another physican;

## 2024-02-10 ENCOUNTER — PATIENT MESSAGE (OUTPATIENT)
Dept: PRIMARY CARE CLINIC | Facility: CLINIC | Age: 39
End: 2024-02-10
Payer: COMMERCIAL

## 2024-02-10 DIAGNOSIS — Z00.00 ANNUAL PHYSICAL EXAM: Primary | ICD-10-CM

## 2024-02-14 ENCOUNTER — OFFICE VISIT (OUTPATIENT)
Dept: CARDIOLOGY | Facility: CLINIC | Age: 39
End: 2024-02-14
Payer: COMMERCIAL

## 2024-02-14 DIAGNOSIS — R29.818 SUSPECTED SLEEP APNEA: ICD-10-CM

## 2024-02-14 DIAGNOSIS — R07.9 CHEST PAIN, UNSPECIFIED TYPE: Primary | ICD-10-CM

## 2024-02-14 DIAGNOSIS — R07.89 OTHER CHEST PAIN: ICD-10-CM

## 2024-02-14 DIAGNOSIS — E03.9 HYPOTHYROIDISM (ACQUIRED): ICD-10-CM

## 2024-02-14 PROCEDURE — 1159F MED LIST DOCD IN RCRD: CPT | Mod: CPTII,95,, | Performed by: PHYSICIAN ASSISTANT

## 2024-02-14 PROCEDURE — 1160F RVW MEDS BY RX/DR IN RCRD: CPT | Mod: CPTII,95,, | Performed by: PHYSICIAN ASSISTANT

## 2024-02-14 PROCEDURE — 99204 OFFICE O/P NEW MOD 45 MIN: CPT | Mod: 95,,, | Performed by: PHYSICIAN ASSISTANT

## 2024-02-14 NOTE — PROGRESS NOTES
General Cardiology Clinic Note  Reason for Visit: Chest Pain   Last Clinic Visit: New Patient  The patient location is: [place]   The chief complaint leading to consultation is: Chest pain     Visit type: audiovisual    Face to Face time with patient: 25  40 minutes of total time spent on the encounter, which includes face to face time and non-face to face time preparing to see the patient (eg, review of tests), Obtaining and/or reviewing separately obtained history, Documenting clinical information in the electronic or other health record, Independently interpreting results (not separately reported) and communicating results to the patient/family/caregiver, or Care coordination (not separately reported).         Each patient to whom he or she provides medical services by telemedicine is:  (1) informed of the relationship between the physician and patient and the respective role of any other health care provider with respect to management of the patient; and (2) notified that he or she may decline to receive medical services by telemedicine and may withdraw from such care at any time.    Notes:       HPI:   Megha Machado is a 38 y.o. female who presents for chest pain.     Problems:  Hypothyroidism   FH of cardiac disease    HPI  Patient was previously seen by Dr. Rivas last month for atypical chest pain. She underwent coronary calcium scoring which was 0. She subsequently presented to the ER on 1/29/2024 for chest pain. Workup including EKG, troponin unremarkable for acute abnormality. D-dimer was mildly elevated. Underwent chest CTA which was negative.     Presents for chest pain x 2-3 months. She states episodes occur sporadically without any pattern or triggers. Chest pain is nonexertional, most recent episodes have occurred while lying down in bed, and occasionally have woken her up out of a deep sleep. The pain is located under her left arm radiating into axilla and left breast. She states it is a  severe stabbing pain and also like someone is sitting on her chest. Episodes last 15-20  minutes. No relieving factors. Pain is not reproducible to palpation. It is not related to food intake. She has not noticed any lumps in her breast. She denies symptoms of CHF, arrhythmia, hypotension. She does reports she always sleeps with head elevated because she has difficulty breathing lying flat. She goes on walks for exercise. Occasionally has a mild tightness in chest with activity but no significant chest pain. She is adopted but her biological mother has told her she has heart disease on both sides of the family     ROS:      Review of Systems   Constitutional: Negative for diaphoresis, malaise/fatigue, weight gain and weight loss.   HENT:  Negative for nosebleeds.    Eyes:  Negative for vision loss in left eye, vision loss in right eye and visual disturbance.   Cardiovascular:  Positive for chest pain. Negative for claudication, dyspnea on exertion, irregular heartbeat, leg swelling, near-syncope, orthopnea, palpitations, paroxysmal nocturnal dyspnea and syncope.   Respiratory:  Negative for cough, shortness of breath, sleep disturbances due to breathing, snoring and wheezing.    Hematologic/Lymphatic: Negative for bleeding problem. Does not bruise/bleed easily.   Skin:  Negative for poor wound healing and rash.   Musculoskeletal:  Negative for muscle cramps and myalgias.   Gastrointestinal:  Negative for bloating, abdominal pain, diarrhea, heartburn, melena, nausea and vomiting.   Genitourinary:  Negative for hematuria and nocturia.   Neurological:  Negative for brief paralysis, dizziness, headaches, light-headedness, numbness and weakness.   Psychiatric/Behavioral:  Negative for depression.    Allergic/Immunologic: Negative for hives.       PMH:   No past medical history on file.  Past Surgical History:   Procedure Laterality Date     SECTION  06, 08, 16    D&C      TUBAL LIGATION  2016  "    Allergies:     Review of patient's allergies indicates:   Allergen Reactions    Adhesive      tape    Penicillins      Medications:     Current Outpatient Medications on File Prior to Visit   Medication Sig Dispense Refill    levothyroxine (SYNTHROID) 112 MCG tablet Take 1 tablet (112 mcg total) by mouth before breakfast. 90 tablet 3    LO LOESTRIN FE 1 mg-10 mcg (24)/10 mcg (2) Tab Take 1 tablet by mouth.      meloxicam (MOBIC) 15 MG tablet TAKE 1 TABLET(15 MG) BY MOUTH EVERY DAY 30 tablet 0    ondansetron (ZOFRAN-ODT) 4 MG TbDL Take 1 tablet (4 mg total) by mouth every 6 (six) hours as needed (Nausea or vomiting). 15 tablet 0    pantoprazole (PROTONIX) 40 MG tablet Take 1 tablet (40 mg total) by mouth once daily. (Patient taking differently: Take 40 mg by mouth as needed.) 30 tablet 11     No current facility-administered medications on file prior to visit.     Social History:     Social History     Tobacco Use    Smoking status: Never    Smokeless tobacco: Never   Substance Use Topics    Alcohol use: Yes     Alcohol/week: 1.0 standard drink of alcohol     Types: 1 Glasses of wine per week     Family History:     Family History   Problem Relation Age of Onset    Heart disease Maternal Grandmother     Heart disease Paternal Grandmother      Physical Exam:   There were no vitals taken for this visit.       Physical Exam   Physical exam not performed since this was a virtual visit   Labs:     Lab Results   Component Value Date     01/29/2024    K 4.0 01/29/2024     01/29/2024    CO2 25 01/29/2024    BUN 11 01/29/2024    CREATININE 0.8 01/29/2024    ANIONGAP 6 (L) 01/29/2024     No results found for: "HGBA1C"  Lab Results   Component Value Date    BNP 33 12/29/2023    Lab Results   Component Value Date    WBC 6.28 01/29/2024    HGB 13.6 01/29/2024    HCT 40.8 01/29/2024     01/29/2024    GRAN 3.5 01/29/2024    GRAN 56.4 01/29/2024     Lab Results   Component Value Date    CHOL 223 (H) 02/22/2023 "    HDL 52 02/22/2023    LDLCALC 145 (H) 02/22/2023    TRIG 139 02/22/2023          Imaging:   Echocardiograms:   TTE 1/2/2024    Left Ventricle: The left ventricle is normal in size. Normal wall thickness. Normal wall motion. There is normal systolic function with a visually estimated ejection fraction of 55 - 60%. There is normal diastolic function.    Right Ventricle: Normal right ventricular cavity size. Wall thickness is normal. Right ventricle wall motion  is normal. Systolic function is normal.    Pulmonary Artery: The estimated pulmonary artery systolic pressure is 18 mmHg.    IVC/SVC: Normal venous pressure at 3 mmHg.    Stress Tests:   None    Caths:   None    Other:  Chest CTA 1/29/2024    1. No evidence of pulmonary embolism.  2. No acute abnormality.    CT Coronary calcium score 1/10/2024  Your total calcium score is 0. The total calcium score of 0 is between the 0 and 25th percentile for females between the ages of 0 and 39.     EKG 1/29/2024: Sinus bradycardia, otherwise normal     EKG 12/29/2023:  Sinus bradycardia, otherwise normal     Assessment:     1. Chest pain, unspecified type    2. Hypothyroidism (acquired)      Plan:     Chest Pain  Recurrent nonexertional chest pain. Recent work up including EKG, CXR, Chest CTA, Troponin, TTE, Coronary calcium score all normal. Chest pain noncardiac. No further cardiac work up indicated at this point. Pain is located under left axilla and into breast, so recommend following up with OBGYN. Other differentials include chest wall pain or GERD.       Follow up PRN    Signed:  Alexus Trevino PA-C  Cardiology   [phone] - General

## 2024-03-19 ENCOUNTER — PATIENT MESSAGE (OUTPATIENT)
Dept: PRIMARY CARE CLINIC | Facility: CLINIC | Age: 39
End: 2024-03-19

## 2024-03-19 ENCOUNTER — OFFICE VISIT (OUTPATIENT)
Dept: PRIMARY CARE CLINIC | Facility: CLINIC | Age: 39
End: 2024-03-19
Payer: COMMERCIAL

## 2024-03-19 DIAGNOSIS — F41.9 ANXIETY: ICD-10-CM

## 2024-03-19 DIAGNOSIS — R11.2 NAUSEA AND VOMITING, UNSPECIFIED VOMITING TYPE: Primary | ICD-10-CM

## 2024-03-19 DIAGNOSIS — F33.0 MILD RECURRENT MAJOR DEPRESSION: ICD-10-CM

## 2024-03-19 DIAGNOSIS — R63.4 UNINTENTIONAL WEIGHT LOSS: ICD-10-CM

## 2024-03-19 PROCEDURE — 99214 OFFICE O/P EST MOD 30 MIN: CPT | Mod: 95,,, | Performed by: INTERNAL MEDICINE

## 2024-03-19 RX ORDER — FLUOXETINE 10 MG/1
10 CAPSULE ORAL DAILY
Qty: 30 CAPSULE | Refills: 11 | Status: SHIPPED | OUTPATIENT
Start: 2024-03-19 | End: 2024-05-06 | Stop reason: SDUPTHER

## 2024-03-19 NOTE — PROGRESS NOTES
[facility] Virtual Visit Note    The patient location is: [place]  The chief complaint leading to consultation is: decreased appetite  Visit type: Virtual visit with synchronous audio and video  Total time spent with patient: 20 min  Each patient to whom he or she provides medical services by telemedicine is:  (1) informed of the relationship between the physician and patient and the respective role of any other health care provider with respect to management of the patient; and (2) notified that he or she may decline to receive medical services by telemedicine and may withdraw from such care at any time.      Chief Complaint    No chief complaint on file.      History of Present Illness      Megha Machado is a 38 y.o. female with chronic conditions of hypothyroidism who presents today for: severe weight loss (60 lbs in 6 months), decreased appetite, nausea, vomiting.  Has been having significant life stress/anxiety/depression due to family issues.  Also having chest pains intermittently, not related to exertion.  Saw cardiology, Dr. Rivas, who ordered CT calcium score (0) and pt presented to ER during a severe episode.  CTA chest did not show any concerning findings.  Flu shot declines.  Td declines.  COVID vaccine UTD.  Mammogram with Dr. Rogel.  PAP smear UTD with DR. Rogel.  Cscope due age 44.     Past Medical History:  No past medical history on file.    Past Surgical History:   has a past surgical history that includes  section (06, 08, 16); Tubal ligation (2016); and D&C.    Family History:  family history includes Heart disease in her maternal grandmother and paternal grandmother.     Social History:  Social History     Tobacco Use    Smoking status: Never    Smokeless tobacco: Never   Substance Use Topics    Alcohol use: Yes     Alcohol/week: 1.0 standard drink of alcohol     Types: 1 Glasses of wine per week    Drug use: Never       Review of Systems   HENT:   Negative for hearing loss.    Eyes:  Negative for discharge.   Respiratory:  Negative for wheezing.    Cardiovascular:  Positive for chest pain and palpitations.   Gastrointestinal:  Positive for vomiting. Negative for blood in stool, constipation and diarrhea.   Genitourinary:  Negative for dysuria and hematuria.   Musculoskeletal:  Negative for neck pain.   Neurological:  Negative for weakness and headaches.   Endo/Heme/Allergies:  Negative for polydipsia.        Medications:  Outpatient Encounter Medications as of 3/19/2024   Medication Sig Dispense Refill    FLUoxetine 10 MG capsule Take 1 capsule (10 mg total) by mouth once daily. 30 capsule 11    levothyroxine (SYNTHROID) 112 MCG tablet Take 1 tablet (112 mcg total) by mouth before breakfast. 90 tablet 3    meloxicam (MOBIC) 15 MG tablet TAKE 1 TABLET(15 MG) BY MOUTH EVERY DAY 30 tablet 0    ondansetron (ZOFRAN-ODT) 4 MG TbDL Take 1 tablet (4 mg total) by mouth every 6 (six) hours as needed (Nausea or vomiting). 15 tablet 0     No facility-administered encounter medications on file as of 3/19/2024.       Allergies:  Review of patient's allergies indicates:   Allergen Reactions    Adhesive      tape    Penicillins        Health Maintenance:  Immunization History   Administered Date(s) Administered    COVID-19, MRNA, LN-S, PF (Pfizer) (Purple Cap) 08/05/2021, 09/23/2021    Influenza 02/12/2020      Health Maintenance   Topic Date Due    TETANUS VACCINE  Never done    Hepatitis C Screening  Completed    Lipid Panel  Completed        Physical Exam       ]    Physical Exam  Constitutional:       General: She is not in acute distress.     Appearance: She is well-developed. She is not diaphoretic.   Eyes:      General: No scleral icterus.        Right eye: No discharge.         Left eye: No discharge.      Conjunctiva/sclera: Conjunctivae normal.   Pulmonary:      Effort: Pulmonary effort is normal. No respiratory distress.   Neurological:      Mental Status: She is  alert and oriented to person, place, and time.   Psychiatric:         Mood and Affect: Mood normal.         Behavior: Behavior normal.         Thought Content: Thought content normal.         Judgment: Judgment normal.          Laboratory:  CBC:  Recent Labs   Lab 02/22/23  0726 12/29/23  1211 01/29/24  1339   WBC 6.4 7.92 6.28   RBC 4.91 4.98 4.66   Hemoglobin 14.2 14.8 13.6   Hematocrit 42.9 44.0 40.8   Platelets 287 253 235   MCV 87.4 88 88   MCH 28.9 29.7 29.2   MCHC 33.1 33.6 33.3     CMP:  Recent Labs   Lab 02/22/23  0726 09/12/23  0800 12/29/23  1211 01/29/24  1339   Glucose 96 98   < > 86   Calcium 9.0 8.9   < > 8.5 L   Albumin 4.0 4.2  --  3.8   Total Protein 6.6 6.5  --  6.6   Sodium 139 138   < > 138   Potassium 4.5 4.6   < > 4.0   CO2 24 27   < > 25   Chloride 106 104   < > 107   BUN 14 12   < > 11   Alkaline Phosphatase  --   --   --  74   ALT 19 14  --  13   AST 15 14  --  16   Total Bilirubin 0.4 0.5  --  0.5    < > = values in this interval not displayed.     URINALYSIS:  Recent Labs   Lab 09/06/22  1221   Color, UA Yellow   Specific Gravity, UA 1.010   pH, UA 7.0   Protein, UA Negative   Bacteria Many A   Nitrite, UA Negative   Leukocytes, UA 2+ A   Urobilinogen, UA Negative      LIPIDS:  Recent Labs   Lab 02/18/22  0805 08/15/22  0739 08/23/22  0853 02/22/23  0726 05/23/23  0802 09/12/23  0800 12/29/23  1211 01/29/24  1339   TSH  --  21.81 H   < > 0.59   < > 2.75 2.723 2.912   HDL 50 52  --  52  --   --   --   --    Cholesterol 252 H 239 H  --  223 H  --   --   --   --    Triglycerides 146 121  --  139  --   --   --   --    LDL Cholesterol 173 H 163 H  --  145 H  --   --   --   --    HDL/Cholesterol Ratio 5.0 H 4.6  --  4.3  --   --   --   --    Non HDL Chol. (LDL+VLDL) 202 H 187 H  --  171 H  --   --   --   --     < > = values in this interval not displayed.     TSH:  Recent Labs   Lab 09/12/23  0800 12/29/23  1211 01/29/24  1339   TSH 2.75 2.723 2.912     A1C:        Assessment/Plan     Megha  Carter is a 38 y.o.female with:    1. Nausea and vomiting, unspecified vomiting type  - CBC Auto Differential; Future  - Comprehensive Metabolic Panel; Future  - TSH; Future  - T4, Free; Future  - AMYLASE; Future  - LIPASE; Future  - CT Abdomen Pelvis With IV Contrast Routine Oral Contrast; Future  - Ambulatory referral/consult to Gastroenterology; Future    2. Unintentional weight loss  - CBC Auto Differential; Future  - Comprehensive Metabolic Panel; Future  - TSH; Future  - T4, Free; Future  - AMYLASE; Future  - LIPASE; Future  - CT Abdomen Pelvis With IV Contrast Routine Oral Contrast; Future  - Ambulatory referral/consult to Gastroenterology; Future    3. Mild recurrent major depression  - FLUoxetine 10 MG capsule; Take 1 capsule (10 mg total) by mouth once daily.  Dispense: 30 capsule; Refill: 11    4. Anxiety  - FLUoxetine 10 MG capsule; Take 1 capsule (10 mg total) by mouth once daily.  Dispense: 30 capsule; Refill: 11       Chronic conditions status updated as per HPI.  Other than changes above, cont current medications and maintain follow up with specialists.  Follow up in about 4 weeks (around 4/16/2024) for Virtual Follow Up.    No future appointments.      Julio Parker MD  [facility]                  Answers submitted by the patient for this visit:  Review of Systems Questionnaire (Submitted on 3/19/2024)  activity change: Yes  unexpected weight change: Yes  rhinorrhea: No  trouble swallowing: No  visual disturbance: No  chest tightness: Yes  polyuria: No  difficulty urinating: No  menstrual problem: No  joint swelling: No  arthralgias: No  confusion: No  dysphoric mood: Yes

## 2024-03-23 ENCOUNTER — PATIENT MESSAGE (OUTPATIENT)
Dept: PRIMARY CARE CLINIC | Facility: CLINIC | Age: 39
End: 2024-03-23
Payer: COMMERCIAL

## 2024-03-23 ENCOUNTER — HOSPITAL ENCOUNTER (OUTPATIENT)
Dept: RADIOLOGY | Facility: HOSPITAL | Age: 39
Discharge: HOME | End: 2024-03-23
Attending: INTERNAL MEDICINE
Payer: COMMERCIAL

## 2024-03-23 DIAGNOSIS — R63.4 UNINTENTIONAL WEIGHT LOSS: ICD-10-CM

## 2024-03-23 DIAGNOSIS — R11.2 NAUSEA AND VOMITING, UNSPECIFIED VOMITING TYPE: ICD-10-CM

## 2024-03-23 PROCEDURE — 74177 CT ABD & PELVIS W/CONTRAST: CPT | Mod: TC

## 2024-03-23 PROCEDURE — 25500020 PHARM REV CODE 255: Performed by: INTERNAL MEDICINE

## 2024-03-23 PROCEDURE — 74177 CT ABD & PELVIS W/CONTRAST: CPT | Mod: 26,,, | Performed by: INTERNAL MEDICINE

## 2024-03-23 PROCEDURE — A9698 NON-RAD CONTRAST MATERIALNOC: HCPCS | Performed by: INTERNAL MEDICINE

## 2024-03-23 RX ADMIN — BARIUM SULFATE 450 ML: 20 SUSPENSION ORAL at 01:03

## 2024-03-23 RX ADMIN — IOHEXOL 75 ML: 350 INJECTION, SOLUTION INTRAVENOUS at 01:03

## 2024-03-26 ENCOUNTER — PATIENT MESSAGE (OUTPATIENT)
Dept: PRIMARY CARE CLINIC | Facility: CLINIC | Age: 39
End: 2024-03-26
Payer: COMMERCIAL

## 2024-04-24 ENCOUNTER — OFFICE VISIT (OUTPATIENT)
Dept: PRIMARY CARE CLINIC | Facility: CLINIC | Age: 39
End: 2024-04-24
Payer: COMMERCIAL

## 2024-04-24 DIAGNOSIS — F33.0 MILD RECURRENT MAJOR DEPRESSION: ICD-10-CM

## 2024-04-24 DIAGNOSIS — F41.9 ANXIETY: Primary | ICD-10-CM

## 2024-04-24 PROCEDURE — 99213 OFFICE O/P EST LOW 20 MIN: CPT | Mod: 95,,, | Performed by: INTERNAL MEDICINE

## 2024-04-24 NOTE — PROGRESS NOTES
[facility] Virtual Visit Note    The patient location is: [place]  The chief complaint leading to consultation is: follow up depression  Visit type: Virtual visit with synchronous audio and video  Total time spent with patient: 20 min  Each patient to whom he or she provides medical services by telemedicine is:  (1) informed of the relationship between the physician and patient and the respective role of any other health care provider with respect to management of the patient; and (2) notified that he or she may decline to receive medical services by telemedicine and may withdraw from such care at any time.      Chief Complaint    No chief complaint on file.      History of Present Illness      Megha Machado is a 38 y.o. female with chronic conditions of hypothyroidism, depression who presents today for: follow up medication change from last visit.  Pt started on fluoxetine 10 mg last visit due to stress/anxiety from family issues causing severe nausea/emesis/weight loss.  Reports symptoms have improved but not fully resolved since starting medication.    Past Medical History:  No past medical history on file.    Past Surgical History:   has a past surgical history that includes  section (06, 08, 16); Tubal ligation (2016); and D&C.    Family History:  family history includes Heart disease in her maternal grandmother and paternal grandmother.     Social History:  Social History     Tobacco Use    Smoking status: Never    Smokeless tobacco: Never   Substance Use Topics    Alcohol use: Yes     Alcohol/week: 1.0 standard drink of alcohol     Types: 1 Glasses of wine per week    Drug use: Never       Review of Systems   Constitutional:  Negative for chills, fever and malaise/fatigue.   HENT:  Negative for hearing loss.    Eyes:  Negative for discharge.   Respiratory:  Negative for shortness of breath and wheezing.    Cardiovascular:  Negative for chest pain and palpitations.    Gastrointestinal:  Positive for vomiting. Negative for blood in stool, constipation, diarrhea and nausea.   Genitourinary:  Negative for dysuria and hematuria.   Musculoskeletal:  Negative for neck pain.   Skin:  Negative for rash.   Neurological:  Negative for weakness and headaches.   Endo/Heme/Allergies:  Negative for polydipsia.   All other systems reviewed and are negative.       Medications:  Outpatient Encounter Medications as of 4/24/2024   Medication Sig Dispense Refill    FLUoxetine 10 MG capsule Take 1 capsule (10 mg total) by mouth once daily. 30 capsule 11    levothyroxine (SYNTHROID) 112 MCG tablet Take 1 tablet (112 mcg total) by mouth before breakfast. 90 tablet 3    meloxicam (MOBIC) 15 MG tablet TAKE 1 TABLET(15 MG) BY MOUTH EVERY DAY 30 tablet 0    ondansetron (ZOFRAN-ODT) 4 MG TbDL Take 1 tablet (4 mg total) by mouth every 6 (six) hours as needed (Nausea or vomiting). 15 tablet 0     No facility-administered encounter medications on file as of 4/24/2024.       Allergies:  Review of patient's allergies indicates:   Allergen Reactions    Adhesive      tape    Penicillins        Health Maintenance:  Immunization History   Administered Date(s) Administered    COVID-19, MRNA, LN-S, PF (Pfizer) (Purple Cap) 08/05/2021, 09/23/2021    Influenza 02/12/2020      Health Maintenance   Topic Date Due    TETANUS VACCINE  Never done    Hepatitis C Screening  Completed    Lipid Panel  Completed        Physical Exam       ]    Physical Exam  Vitals reviewed.   Constitutional:       Appearance: She is well-developed.   HENT:      Head: Normocephalic and atraumatic.      Right Ear: External ear normal.      Left Ear: External ear normal.   Cardiovascular:      Rate and Rhythm: Normal rate and regular rhythm.      Heart sounds: Normal heart sounds. No murmur heard.  Pulmonary:      Effort: Pulmonary effort is normal.      Breath sounds: Normal breath sounds. No wheezing or rales.   Abdominal:      General: Bowel  sounds are normal. There is no distension.      Palpations: Abdomen is soft.      Tenderness: There is no abdominal tenderness.          Laboratory:  CBC:  Recent Labs   Lab 12/29/23  1211 01/29/24  1339 03/23/24  1125   WBC 7.92 6.28 7.76   RBC 4.98 4.66 4.77   Hemoglobin 14.8 13.6 13.8   Hematocrit 44.0 40.8 42.0   Platelets 253 235 258   MCV 88 88 88   MCH 29.7 29.2 28.9   MCHC 33.6 33.3 32.9     CMP:  Recent Labs   Lab 09/12/23  0800 12/29/23  1211 01/29/24  1339 03/23/24  1125   Glucose 98   < > 86 88   Calcium 8.9   < > 8.5 L 9.3   Albumin 4.2  --  3.8 4.0   Total Protein 6.5  --  6.6 6.6   Sodium 138   < > 138 138   Potassium 4.6   < > 4.0 3.9   CO2 27   < > 25 25   Chloride 104   < > 107 105   BUN 12   < > 11 12   Alkaline Phosphatase  --   --  74 73   ALT 14  --  13 12   AST 14  --  16 15   Total Bilirubin 0.5  --  0.5 0.8    < > = values in this interval not displayed.     URINALYSIS:  Recent Labs   Lab 09/06/22  1221   Color, UA Yellow   Specific Gravity, UA 1.010   pH, UA 7.0   Protein, UA Negative   Bacteria Many A   Nitrite, UA Negative   Leukocytes, UA 2+ A   Urobilinogen, UA Negative      LIPIDS:  Recent Labs   Lab 02/18/22  0805 08/15/22  0739 08/23/22  0853 02/22/23  0726 05/23/23  0802 12/29/23  1211 01/29/24  1339 03/23/24  1125   TSH  --  21.81 H   < > 0.59   < > 2.723 2.912 1.986   HDL 50 52  --  52  --   --   --   --    Cholesterol 252 H 239 H  --  223 H  --   --   --   --    Triglycerides 146 121  --  139  --   --   --   --    LDL Cholesterol 173 H 163 H  --  145 H  --   --   --   --    HDL/Cholesterol Ratio 5.0 H 4.6  --  4.3  --   --   --   --    Non HDL Chol. (LDL+VLDL) 202 H 187 H  --  171 H  --   --   --   --     < > = values in this interval not displayed.     TSH:  Recent Labs   Lab 12/29/23  1211 01/29/24  1339 03/23/24  1125   TSH 2.723 2.912 1.986     A1C:        Assessment/Plan     Megha Machado is a 38 y.o.female with:    1. Anxiety    2. Mild recurrent major  depression  Continue current meds.      Chronic conditions status updated as per HPI.  Other than changes above, cont current medications and maintain follow up with specialists.  No follow-ups on file.    Future Appointments   Date Time Provider Department Center   5/23/2024  8:40 AM Sebastián Franklin MD [facility] [place]       Julio Parker MD  [facility]                  Answers submitted by the patient for this visit:  Review of Systems Questionnaire (Submitted on 4/24/2024)  activity change: No  unexpected weight change: No  rhinorrhea: No  trouble swallowing: No  visual disturbance: No  chest tightness: No  polyuria: No  difficulty urinating: No  menstrual problem: No  joint swelling: No  arthralgias: No  confusion: No  dysphoric mood: No

## 2024-05-03 ENCOUNTER — TELEPHONE (OUTPATIENT)
Dept: PRIMARY CARE CLINIC | Facility: CLINIC | Age: 39
End: 2024-05-03
Payer: COMMERCIAL

## 2024-05-03 DIAGNOSIS — F41.9 ANXIETY: ICD-10-CM

## 2024-05-03 DIAGNOSIS — F33.0 MILD RECURRENT MAJOR DEPRESSION: ICD-10-CM

## 2024-05-03 NOTE — TELEPHONE ENCOUNTER
Pt states this is not working for her anymore she states she is so tired of crying and she is needing some help she states she is going through a divorce as well.     Please advise.

## 2024-05-03 NOTE — TELEPHONE ENCOUNTER
----- Message from Ale Dorsey sent at 5/3/2024  4:33 PM CDT -----  Contact: [phone]  [email or web address]     Patient is calling for Medical Advice regarding:prozac is not working     How long has patient had these symptoms:    Pharmacy name and phone#:    Would like response via Tweetwallt:  no     Comments:  Pt states this is not working for her anymore she states she is so tired of crying and she is needing some help she states she is going through a divorce as well

## 2024-05-06 RX ORDER — FLUOXETINE HYDROCHLORIDE 40 MG/1
40 CAPSULE ORAL DAILY
Qty: 30 CAPSULE | Refills: 11 | Status: SHIPPED | OUTPATIENT
Start: 2024-05-06 | End: 2025-05-06

## 2024-10-01 ENCOUNTER — HOSPITAL ENCOUNTER (EMERGENCY)
Facility: HOSPITAL | Age: 39
Discharge: HOME | End: 2024-10-01
Attending: EMERGENCY MEDICINE
Payer: COMMERCIAL

## 2024-10-01 VITALS
OXYGEN SATURATION: 98 % | TEMPERATURE: 98 F | WEIGHT: 120 LBS | DIASTOLIC BLOOD PRESSURE: 76 MMHG | BODY MASS INDEX: 22.66 KG/M2 | HEIGHT: 61 IN | SYSTOLIC BLOOD PRESSURE: 122 MMHG | HEART RATE: 74 BPM | RESPIRATION RATE: 17 BRPM

## 2024-10-01 DIAGNOSIS — M25.539 WRIST PAIN: ICD-10-CM
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
## 2024-10-01 LAB
B-HCG UR QL: NEGATIVE
CTP QC/QA: YES

## 2024-10-01 PROCEDURE — 63600175 PHARM REV CODE 636 W HCPCS: Performed by: PHYSICIAN ASSISTANT

## 2024-10-01 PROCEDURE — 99284 EMERGENCY DEPT VISIT MOD MDM: CPT | Mod: 25

## 2024-10-01 PROCEDURE — 90471 IMMUNIZATION ADMIN: CPT | Performed by: PHYSICIAN ASSISTANT

## 2024-10-01 PROCEDURE — 90715 TDAP VACCINE 7 YRS/> IM: CPT | Performed by: PHYSICIAN ASSISTANT

## 2024-10-01 PROCEDURE — 81025 URINE PREGNANCY TEST: CPT | Performed by: NURSE PRACTITIONER

## 2024-10-01 RX ORDER — CLINDAMYCIN HYDROCHLORIDE 150 MG/1
450 CAPSULE ORAL EVERY 8 HOURS
Qty: 63 CAPSULE | Refills: 0 | Status: SHIPPED | OUTPATIENT
Start: 2024-10-01 | End: 2024-10-08

## 2024-10-01 RX ORDER — CIPROFLOXACIN 500 MG/1
500 TABLET ORAL 2 TIMES DAILY
Qty: 14 TABLET | Refills: 0 | Status: SHIPPED | OUTPATIENT
Start: 2024-10-01 | End: 2024-10-08

## 2024-10-01 RX ADMIN — TETANUS TOXOID, REDUCED DIPHTHERIA TOXOID AND ACELLULAR PERTUSSIS VACCINE, ADSORBED 0.5 ML: 5; 2.5; 8; 8; 2.5 SUSPENSION INTRAMUSCULAR at 04:10

## 2024-10-01 NOTE — ED PROVIDER NOTES
Encounter Date: 10/1/2024       History     Chief Complaint   Patient presents with    Animal Bite     Pt reports being bitten by brother's pit bull to R lower forearm. To has 2 puncture wounds on arm. +contusion, +swelling, -bleeding. Denies other sx or complaints.      38-year-old female presents to ED with concern of dog bite to right wrist/forearm that occurred this afternoon after she was bit by her brother's dog.  Denying concern for rabies.  Unsure of last tetanus.  Bleeding controlled pressure.  Denying numbness or focal weakness.  No other acute complaints or other acute injuries reported at this time    The history is provided by the patient.     Review of patient's allergies indicates:   Allergen Reactions    Adhesive      tape    Penicillins      History reviewed. No pertinent past medical history.  Past Surgical History:   Procedure Laterality Date     SECTION  06, 08, 16    D&C      TUBAL LIGATION  2016     Family History   Problem Relation Name Age of Onset    Heart disease Maternal Grandmother      Heart disease Paternal Grandmother       Social History     Tobacco Use    Smoking status: Never    Smokeless tobacco: Never   Substance Use Topics    Alcohol use: Yes     Alcohol/week: 1.0 standard drink of alcohol     Types: 1 Glasses of wine per week    Drug use: Never     Review of Systems   Skin:  Positive for wound.   Neurological:  Negative for weakness and numbness.       Physical Exam     Initial Vitals [10/01/24 1529]   BP Pulse Resp Temp SpO2   138/71 70 16 98.3 °F (36.8 °C) 98 %      MAP       --         Physical Exam    Vitals reviewed.  Constitutional: She appears well-developed and well-nourished. She is active. She does not have a sickly appearance. She does not appear ill. No distress.   HENT:   Head: Normocephalic and atraumatic.   Neck:   Normal range of motion.  Musculoskeletal:      Cervical back: Normal range of motion.     Neurological: She is alert. GCS  eye subscore is 4. GCS verbal subscore is 5. GCS motor subscore is 6.   Skin: Skin is warm and dry.   Puncture wound x2 to right distal forearm.  Bleeding controlled.  No palpable deformities.   Psychiatric: She has a normal mood and affect. Her speech is normal and behavior is normal.         ED Course   Procedures  Labs Reviewed   POCT URINE PREGNANCY - Normal       Result Value    POC Preg Test, Ur Negative       Acceptable Yes            Imaging Results              X-Ray Wrist Complete Right (Final result)  Result time 10/01/24 16:41:54      Final result by Mateo Guerra MD (10/01/24 16:41:54)                   Impression:      No evidence for acute fracture, bone destruction, or dislocation.    Soft tissue swelling with small amount of soft tissue air in this patient with history of dog bite.  No radiopaque foreign bodies are identified.      Electronically signed by: Mateo Guerra MD  Date:    10/01/2024  Time:    16:41               Narrative:    EXAMINATION:  XR WRIST COMPLETE 3 VIEWS RIGHT; XR FOREARM RIGHT    CLINICAL HISTORY:  Pain in unspecified wrist; Bitten by dog, initial encounter    TECHNIQUE:  PA, lateral, and oblique views of the right wrist were performed.  AP and lateral radiographs of the right forearm were also obtained.    COMPARISON:  None    FINDINGS:  The bones are intact.  There is no evidence for acute fracture or bone destruction.  There is no evidence for dislocation.  No bony erosions are identified.  There is soft tissue swelling at the distal left forearm and there is a small amount of soft tissue air present in this patient with history of dog bite.  No radiopaque foreign bodies are identified.                                       X-Ray Forearm Right (Final result)  Result time 10/01/24 16:41:54      Final result by Mateo Guerra MD (10/01/24 16:41:54)                   Impression:      No evidence for acute fracture, bone destruction, or  dislocation.    Soft tissue swelling with small amount of soft tissue air in this patient with history of dog bite.  No radiopaque foreign bodies are identified.      Electronically signed by: Mateo Guerra MD  Date:    10/01/2024  Time:    16:41               Narrative:    EXAMINATION:  XR WRIST COMPLETE 3 VIEWS RIGHT; XR FOREARM RIGHT    CLINICAL HISTORY:  Pain in unspecified wrist; Bitten by dog, initial encounter    TECHNIQUE:  PA, lateral, and oblique views of the right wrist were performed.  AP and lateral radiographs of the right forearm were also obtained.    COMPARISON:  None    FINDINGS:  The bones are intact.  There is no evidence for acute fracture or bone destruction.  There is no evidence for dislocation.  No bony erosions are identified.  There is soft tissue swelling at the distal left forearm and there is a small amount of soft tissue air present in this patient with history of dog bite.  No radiopaque foreign bodies are identified.                                       Medications   Tdap (BOOSTRIX) vaccine injection 0.5 mL (0.5 mLs Intramuscular Given 10/1/24 1608)     Medical Decision Making  Patient presents with concern of dog bite to right forearm that occurred today.  Afebrile with vitals WNL.  Patient in no distress on exam    DDx:  Including but not limited to dog bite, laceration, puncture wound, abrasion, wound check    Amount and/or Complexity of Data Reviewed  Radiology:  Decision-making details documented in ED Course.    Risk  Prescription drug management.               ED Course as of 10/01/24 1825 Tue Oct 01, 2024   1615 POCT urine pregnancy  Urine pregnancy negative. [BJ]   1645 BP: 138/71 [KS]   1645 Temp: 98.3 °F (36.8 °C) [KS]   1645 Pulse: 70 [KS]   1645 Resp: 16 [KS]   1645 SpO2: 98 % [KS]   1645 Vitals WNL [KS]   1645 X-Ray Forearm Right  X-ray right forearm per my interpretation and Radiology review showing no radiopaque foreign body or acute bony abnormality [KS]   1645  X-Ray Wrist Complete Right  X-ray right wrist per my interpretation and Radiology review showing no radiopaque foreign body or acute bony abnormality [KS]   1753 Wound site was thoroughly cleaned and sterile dressings were applied.  Tetanus updated.  Patient reporting anaphylactic reaction to penicillin; will defer Augmentin.  Prescription written for clindamycin and ciprofloxacin.  Patient was educated on potential side effects of these antibiotics.  Encouraged to continue monitoring wound healing closely with close outpatient follow-up.  ED return precautions were discussed at length.  Patient states her understanding and agrees with plan [KS]      ED Course User Index  [BJ] Shannan Cardenas PA-C  [KS] Crescencio Dang PA-C                           Clinical Impression:  Final diagnoses:  [W54.0XXA] Dog bite (Primary)  [M25.539] Wrist pain          ED Disposition Condition    Discharge Stable          ED Prescriptions       Medication Sig Dispense Start Date End Date Auth. Provider    clindamycin (CLEOCIN) 150 MG capsule Take 3 capsules (450 mg total) by mouth every 8 (eight) hours. for 7 days 63 capsule 10/1/2024 10/8/2024 Crescencio Dang PA-C    ciprofloxacin HCl (CIPRO) 500 MG tablet Take 1 tablet (500 mg total) by mouth 2 (two) times daily. for 7 days 14 tablet 10/1/2024 10/8/2024 Crescencio Dang PA-C          Follow-up Information       Follow up With Specialties Details Why Contact Info    Julio Parker MD Internal Medicine   [id number] [facility]  [place] [address]  [phone]               Crescencio Dang PA-C  10/01/24 2154

## 2024-10-01 NOTE — ED TRIAGE NOTES
Dog bite to right forearm that occurred earlier today. States dog belongs to her brother who is currently incarcerated. Unsure if dog is up to date on vaccines. Puncture wound x 2 noted to right forearm. No active bleeding.

## 2024-10-01 NOTE — FIRST PROVIDER EVALUATION
Emergency Department TeleTriage Encounter Note      CHIEF COMPLAINT    Chief Complaint   Patient presents with    Animal Bite     Pt reports being bitten by brother's pit bull to R lower forearm. To has 2 puncture wounds on arm. +contusion, +swelling, -bleeding. Denies other sx or complaints.        VITAL SIGNS   Initial Vitals [10/01/24 1529]   BP Pulse Resp Temp SpO2   138/71 70 16 98.3 °F (36.8 °C) 98 %      MAP       --            ALLERGIES    Review of patient's allergies indicates:   Allergen Reactions    Adhesive      tape    Penicillins        PROVIDER TRIAGE NOTE  Bitten by dog two hours prior to arrival. Brother's dog got nervous about large amount of people around and bit her. Tetanus greater than ten years ago. Bite is on right forearm/wrist.     Limited physical exam via telehealth: The patient is awake, alert, answering questions appropriately and is not in respiratory distress.  As the Teletriage provider, I performed an initial assessment and ordered appropriate labs and imaging studies, if any, to facilitate the patient's care once placed in the ED. Once a room is available, care and a full evaluation will be completed by an alternate ED provider.  Any additional orders and the final disposition will be determined by that provider.  All imaging and labs will not be followed-up by the Teletriage Team, including myself.          ORDERS  Labs Reviewed - No data to display    ED Orders (720h ago, onward)      Start Ordered     Status Ordering Provider    10/01/24 1637 10/01/24 1537  Tdap (BOOSTRIX) vaccine injection 0.5 mL  vaccine x 1 dose         Ordered NESS MAURICIO    10/01/24 1538 10/01/24 1537  X-Ray Wrist Complete Right  1 time imaging         Ordered NESS MAURICIO    10/01/24 1538 10/01/24 1537  POCT urine pregnancy  Once         Ordered NESS MAURICIO    10/01/24 1538 10/01/24 1537  Wound care routine (specify)  Daily      Order ID Start Status Ordering Provider   [id number]  10/01/24 1538 Ordered DARDAR, NESS A.    10/02/24 1400 Scheduled DARDAR, NESS A.    10/03/24 1400 Scheduled DARDAR, NESS A.    10/04/24 1400 Scheduled DARDAR, NESS A.       Ordered DARDAR, NESS A.    10/01/24 1537 10/01/24 1537  X-Ray Forearm Right  1 time imaging         Ordered ADRIANADAR, NESS A.              Virtual Visit Note: The provider triage portion of this emergency department evaluation and documentation was performed via [employer], a HIPAA-compliant telemedicine application, in concert with a tele-presenter in the room. A face to face patient evaluation with one of my colleagues will occur once the patient is placed in an emergency department room.      DISCLAIMER: This note was prepared with [employer] voice recognition transcription software. Garbled syntax, mangled pronouns, and other bizarre constructions may be attributed to that software system.

## 2024-10-01 NOTE — DISCHARGE INSTRUCTIONS

## 2024-11-05 ENCOUNTER — PATIENT MESSAGE (OUTPATIENT)
Dept: PRIMARY CARE CLINIC | Facility: CLINIC | Age: 39
End: 2024-11-05
Payer: COMMERCIAL

## 2024-11-06 DIAGNOSIS — Z12.31 OTHER SCREENING MAMMOGRAM: ICD-10-CM

## 2024-11-19 ENCOUNTER — HOSPITAL ENCOUNTER (OUTPATIENT)
Dept: RADIOLOGY | Facility: HOSPITAL | Age: 39
Discharge: HOME | End: 2024-11-19
Attending: INTERNAL MEDICINE
Payer: COMMERCIAL

## 2024-11-19 VITALS — WEIGHT: 120 LBS | BODY MASS INDEX: 22.66 KG/M2 | HEIGHT: 61 IN

## 2024-11-19 DIAGNOSIS — Z12.31 OTHER SCREENING MAMMOGRAM: ICD-10-CM

## 2024-11-19 PROCEDURE — 77067 SCR MAMMO BI INCL CAD: CPT | Mod: TC

## 2024-12-26 ENCOUNTER — HOSPITAL ENCOUNTER (EMERGENCY)
Facility: HOSPITAL | Age: 39
Discharge: HOME | End: 2024-12-26
Attending: EMERGENCY MEDICINE
Payer: COMMERCIAL

## 2024-12-26 VITALS
TEMPERATURE: 98 F | HEART RATE: 67 BPM | RESPIRATION RATE: 16 BRPM | DIASTOLIC BLOOD PRESSURE: 65 MMHG | WEIGHT: 130 LBS | BODY MASS INDEX: 24.55 KG/M2 | OXYGEN SATURATION: 97 % | HEIGHT: 61 IN | SYSTOLIC BLOOD PRESSURE: 115 MMHG

## 2024-12-26 DIAGNOSIS — R10.32 LEFT GROIN PAIN: Primary | ICD-10-CM

## 2024-12-26 LAB
ALBUMIN SERPL BCP-MCNC: 3.9 G/DL (ref 3.5–5.2)
ALP SERPL-CCNC: 96 U/L (ref 40–150)
ALT SERPL W/O P-5'-P-CCNC: 8 U/L (ref 10–44)
ANION GAP SERPL CALC-SCNC: 8 MMOL/L (ref 8–16)
AST SERPL-CCNC: 15 U/L (ref 10–40)
B-HCG UR QL: NEGATIVE
BACTERIA #/AREA URNS HPF: ABNORMAL /HPF
BASOPHILS # BLD AUTO: 0.08 K/UL (ref 0–0.2)
BASOPHILS NFR BLD: 0.6 % (ref 0–1.9)
BILIRUB SERPL-MCNC: 0.3 MG/DL (ref 0.1–1)
BILIRUB UR QL STRIP: NEGATIVE
BUN SERPL-MCNC: 12 MG/DL (ref 6–20)
CALCIUM SERPL-MCNC: 9 MG/DL (ref 8.7–10.5)
CHLORIDE SERPL-SCNC: 105 MMOL/L (ref 95–110)
CLARITY UR: ABNORMAL
CO2 SERPL-SCNC: 26 MMOL/L (ref 23–29)
COLOR UR: YELLOW
CREAT SERPL-MCNC: 0.9 MG/DL (ref 0.5–1.4)
CTP QC/QA: YES
DIFFERENTIAL METHOD BLD: ABNORMAL
EOSINOPHIL # BLD AUTO: 0.2 K/UL (ref 0–0.5)
EOSINOPHIL NFR BLD: 1.5 % (ref 0–8)
ERYTHROCYTE [DISTWIDTH] IN BLOOD BY AUTOMATED COUNT: 12.6 % (ref 11.5–14.5)
EST. GFR  (NO RACE VARIABLE): >60 ML/MIN/1.73 M^2
GLUCOSE SERPL-MCNC: 110 MG/DL (ref 70–110)
GLUCOSE UR QL STRIP: NEGATIVE
HCT VFR BLD AUTO: 40.3 % (ref 37–48.5)
HGB BLD-MCNC: 13.4 G/DL (ref 12–16)
HGB UR QL STRIP: ABNORMAL
IMM GRANULOCYTES # BLD AUTO: 0.1 K/UL (ref 0–0.04)
IMM GRANULOCYTES NFR BLD AUTO: 0.8 % (ref 0–0.5)
KETONES UR QL STRIP: NEGATIVE
LEUKOCYTE ESTERASE UR QL STRIP: ABNORMAL
LIPASE SERPL-CCNC: 35 U/L (ref 4–60)
LYMPHOCYTES # BLD AUTO: 1.7 K/UL (ref 1–4.8)
LYMPHOCYTES NFR BLD: 13.1 % (ref 18–48)
MCH RBC QN AUTO: 29.3 PG (ref 27–31)
MCHC RBC AUTO-ENTMCNC: 33.3 G/DL (ref 32–36)
MCV RBC AUTO: 88 FL (ref 82–98)
MICROSCOPIC COMMENT: ABNORMAL
MONOCYTES # BLD AUTO: 1.1 K/UL (ref 0.3–1)
MONOCYTES NFR BLD: 8.3 % (ref 4–15)
NEUTROPHILS # BLD AUTO: 9.9 K/UL (ref 1.8–7.7)
NEUTROPHILS NFR BLD: 75.7 % (ref 38–73)
NITRITE UR QL STRIP: NEGATIVE
NRBC BLD-RTO: 0 /100 WBC
PH UR STRIP: 8 [PH] (ref 5–8)
PLATELET # BLD AUTO: 287 K/UL (ref 150–450)
PMV BLD AUTO: 9.8 FL (ref 9.2–12.9)
POTASSIUM SERPL-SCNC: 3.8 MMOL/L (ref 3.5–5.1)
PROT SERPL-MCNC: 7.3 G/DL (ref 6–8.4)
PROT UR QL STRIP: ABNORMAL
RBC # BLD AUTO: 4.57 M/UL (ref 4–5.4)
RBC #/AREA URNS HPF: 2 /HPF (ref 0–4)
SODIUM SERPL-SCNC: 139 MMOL/L (ref 136–145)
SP GR UR STRIP: 1.02 (ref 1–1.03)
SQUAMOUS #/AREA URNS HPF: 10 /HPF
URN SPEC COLLECT METH UR: ABNORMAL
UROBILINOGEN UR STRIP-ACNC: NEGATIVE EU/DL
WBC # BLD AUTO: 13.13 K/UL (ref 3.9–12.7)
WBC #/AREA URNS HPF: 7 /HPF (ref 0–5)

## 2024-12-26 PROCEDURE — 25500020 PHARM REV CODE 255

## 2024-12-26 PROCEDURE — 81025 URINE PREGNANCY TEST: CPT

## 2024-12-26 PROCEDURE — 96375 TX/PRO/DX INJ NEW DRUG ADDON: CPT

## 2024-12-26 PROCEDURE — 85025 COMPLETE CBC W/AUTO DIFF WBC: CPT

## 2024-12-26 PROCEDURE — 83690 ASSAY OF LIPASE: CPT

## 2024-12-26 PROCEDURE — 99285 EMERGENCY DEPT VISIT HI MDM: CPT | Mod: 25

## 2024-12-26 PROCEDURE — 80053 COMPREHEN METABOLIC PANEL: CPT

## 2024-12-26 PROCEDURE — 63600175 PHARM REV CODE 636 W HCPCS

## 2024-12-26 PROCEDURE — 81000 URINALYSIS NONAUTO W/SCOPE: CPT

## 2024-12-26 PROCEDURE — 96374 THER/PROPH/DIAG INJ IV PUSH: CPT | Mod: 59

## 2024-12-26 RX ORDER — ONDANSETRON 4 MG/1
4 TABLET, ORALLY DISINTEGRATING ORAL EVERY 6 HOURS PRN
Qty: 28 TABLET | Refills: 0 | Status: SHIPPED | OUTPATIENT
Start: 2024-12-26

## 2024-12-26 RX ORDER — PREDNISONE 20 MG/1
40 TABLET ORAL DAILY
Qty: 10 TABLET | Refills: 0 | Status: SHIPPED | OUTPATIENT
Start: 2024-12-26 | End: 2024-12-31

## 2024-12-26 RX ORDER — ONDANSETRON HYDROCHLORIDE 2 MG/ML
4 INJECTION, SOLUTION INTRAVENOUS
Status: COMPLETED | OUTPATIENT
Start: 2024-12-26 | End: 2024-12-26

## 2024-12-26 RX ORDER — KETOROLAC TROMETHAMINE 10 MG/1
10 TABLET, FILM COATED ORAL EVERY 6 HOURS
Qty: 20 TABLET | Refills: 0 | Status: SHIPPED | OUTPATIENT
Start: 2024-12-26 | End: 2024-12-31

## 2024-12-26 RX ORDER — KETOROLAC TROMETHAMINE 30 MG/ML
15 INJECTION, SOLUTION INTRAMUSCULAR; INTRAVENOUS
Status: COMPLETED | OUTPATIENT
Start: 2024-12-26 | End: 2024-12-26

## 2024-12-26 RX ADMIN — IOHEXOL 75 ML: 350 INJECTION, SOLUTION INTRAVENOUS at 08:12

## 2024-12-26 RX ADMIN — ONDANSETRON 4 MG: 2 INJECTION INTRAMUSCULAR; INTRAVENOUS at 08:12

## 2024-12-26 RX ADMIN — KETOROLAC TROMETHAMINE 15 MG: 30 INJECTION, SOLUTION INTRAMUSCULAR; INTRAVENOUS at 08:12

## 2024-12-26 NOTE — Clinical Note
"Megha Mcnamarajordan Machado was seen and treated in our emergency department on 12/26/2024.  She may return to work on 12/28/2024.       If you have any questions or concerns, please don't hesitate to call.      Shannan Cardenas PA-C"

## 2024-12-27 NOTE — ED NOTES
Pt laying in bed, chest rising and falling. PT states she is cold and requests a blanket at this time. Pt brought blanket at this time.   Pt is updated on POC. Pt has no other complaints or requests at this time.

## 2024-12-27 NOTE — DISCHARGE INSTRUCTIONS
Today you were seen in the emergency room for abdominal pain. Abdominal pain has many possible causes. Today your lab work and imaging did not show that the pain is from a serious condition, and it may get better within a few days. However, you will likely need more testing and treatment; therefore, it is important to follow up with your primary care doctor or see a gastroenterologist for recheck and further workup/treatment.     If you have been prescribed medications today, please take them as directed. Otherwise care for yourself at home by doing the following:  Rest until you feel better.  To prevent dehydration, drink plenty of fluids. Choose water and other clear liquids until you feel better. If you have kidney, heart, or liver disease and have to limit fluids, talk with your doctor before you increase the amount of fluids you drink.  When you feel like eating, start with small amounts. Do not have alcohol, caffeine, or spicy, hot, or high-fat foods for a day or two.    Thank you for allowing me and my emergency team to take care of you here today! I hope you feel better soon. Please do not hesitate to return with any additional concerns that may arise from this or any new problem you encounter.    Our goal in the emergency department is to always give you outstanding care and exceptional service. If you receive a survey by mail or e-mail in the next week regarding your experience in our ED, we would greatly appreciate you completing it. Your feedback provides us with a way to recognize our staff who give very good care and it helps us learn how to improve when your experience was below the excellence we aspire to be!    Brook Juneau, PA-C [facility]   Emergency Room Physician Assistant

## 2024-12-27 NOTE — ED NOTES
PT came to ED with CC of groin pain. PT states she was experiencing pain in her left groin that she describes as a pinching feeling. Pt states that she has a high pain tolerance, but the pain was so high that she vomited. Pt also states that when she vomited, she vomited blood. Pt also states she has been very congested and is unsure if the blood came from that, or how hard she vomited.

## 2024-12-27 NOTE — ED PROVIDER NOTES
"Encounter Date: 2024       History     Chief Complaint   Patient presents with    Groin Pain     Sharp Left groin pain x a few hours, +vomiting but denies abdominal pain. Denies urinary complaints.      Patient is a 39-year-old female with no significant past medical history who presents to emergency room for left lower quadrant abdominal/groin pain that onset about 2 hours prior to arrival.  Patient states it initially started as a "light pinch" however it isn't worsening in severity.  Patient states "it has gotten so bad that it has made me vomit multiple times." She has had 2 episodes of vomiting with the scant bright red blood.  No dysuria, hematuria, changes in bowel movements, rectal bleeding, lightheadedness, weakness, numbness, tingling, chest pain, shortness of breath, vaginal pain, abnormal vaginal discharge, or others at this time.  No medications taken prior to arrival.    The history is provided by the patient. No  was used.     Review of patient's allergies indicates:   Allergen Reactions    Adhesive      tape    Penicillins      History reviewed. No pertinent past medical history.  Past Surgical History:   Procedure Laterality Date     SECTION  06, 08, 16    D&C      TUBAL LIGATION  2016     Family History   Problem Relation Name Age of Onset    Heart disease Maternal Grandmother      Heart disease Paternal Grandmother       Social History     Tobacco Use    Smoking status: Never    Smokeless tobacco: Never   Substance Use Topics    Alcohol use: Yes     Alcohol/week: 1.0 standard drink of alcohol     Types: 1 Glasses of wine per week    Drug use: Never     Review of Systems   Constitutional:  Negative for chills, diaphoresis, fatigue and fever.   HENT:  Negative for congestion, sore throat and trouble swallowing.    Respiratory:  Negative for cough and shortness of breath.    Cardiovascular:  Negative for chest pain and palpitations. "   Gastrointestinal:  Positive for abdominal pain (left lower quadrant), nausea and vomiting. Negative for blood in stool, constipation and diarrhea.   Genitourinary:  Negative for difficulty urinating, dysuria, frequency and urgency.   Musculoskeletal:  Negative for back pain and myalgias.   Skin:  Negative for rash and wound.   Neurological:  Negative for weakness, light-headedness, numbness and headaches.       Physical Exam     Initial Vitals [12/26/24 1851]   BP Pulse Resp Temp SpO2   (!) 155/86 82 18 97.9 °F (36.6 °C) 98 %      MAP       --         Physical Exam    Nursing note and vitals reviewed.  Constitutional: She appears well-developed and well-nourished. She is not diaphoretic. No distress.   Patient well-appearing.  Awake and alert.  No acute distress.  Maintaining airway appropriately.  Speaking in complete sentences.   HENT:   Head: Normocephalic and atraumatic.   Right Ear: External ear normal.   Left Ear: External ear normal.   Eyes: Conjunctivae and EOM are normal. Pupils are equal, round, and reactive to light.   Neck: Neck supple.   Normal range of motion.  Cardiovascular:  Normal rate, regular rhythm and normal heart sounds.     Exam reveals no friction rub.       No murmur heard.  Pulmonary/Chest: Breath sounds normal. No respiratory distress. She has no wheezes. She has no rhonchi. She has no rales.   Abdominal: Abdomen is soft. Bowel sounds are normal. She exhibits no distension. There is abdominal tenderness in the left lower quadrant.     There is no rebound and no guarding.   Musculoskeletal:         General: No tenderness or edema. Normal range of motion.      Cervical back: Normal range of motion and neck supple.     Neurological: She is alert and oriented to person, place, and time. She has normal strength. GCS score is 15. GCS eye subscore is 4. GCS verbal subscore is 5. GCS motor subscore is 6.   Skin: Skin is warm and dry.   Psychiatric: She has a normal mood and affect. Her behavior  is normal. Thought content normal.         ED Course   Procedures  Labs Reviewed   CBC W/ AUTO DIFFERENTIAL - Abnormal       Result Value    WBC 13.13 (*)     RBC 4.57      Hemoglobin 13.4      Hematocrit 40.3      MCV 88      MCH 29.3      MCHC 33.3      RDW 12.6      Platelets 287      MPV 9.8      Immature Granulocytes 0.8 (*)     Gran # (ANC) 9.9 (*)     Immature Grans (Abs) 0.10 (*)     Lymph # 1.7      Mono # 1.1 (*)     Eos # 0.2      Baso # 0.08      nRBC 0      Gran % 75.7 (*)     Lymph % 13.1 (*)     Mono % 8.3      Eosinophil % 1.5      Basophil % 0.6      Differential Method Automated     COMPREHENSIVE METABOLIC PANEL - Abnormal    Sodium 139      Potassium 3.8      Chloride 105      CO2 26      Glucose 110      BUN 12      Creatinine 0.9      Calcium 9.0      Total Protein 7.3      Albumin 3.9      Total Bilirubin 0.3      Alkaline Phosphatase 96      AST 15      ALT 8 (*)     eGFR >60      Anion Gap 8     URINALYSIS, REFLEX TO URINE CULTURE - Abnormal    Specimen UA Urine, Clean Catch      Color, UA Yellow      Appearance, UA Hazy (*)     pH, UA 8.0      Specific Gravity, UA 1.020      Protein, UA Trace (*)     Glucose, UA Negative      Ketones, UA Negative      Bilirubin (UA) Negative      Occult Blood UA Trace (*)     Nitrite, UA Negative      Urobilinogen, UA Negative      Leukocytes, UA 1+ (*)     Narrative:     Specimen Source->Urine   URINALYSIS MICROSCOPIC - Abnormal    RBC, UA 2      WBC, UA 7 (*)     Bacteria Rare      Squam Epithel, UA 10      Microscopic Comment SEE COMMENT      Narrative:     Specimen Source->Urine   LIPASE    Lipase 35     POCT URINE PREGNANCY    POC Preg Test, Ur Negative       Acceptable Yes            Imaging Results              CT Abdomen Pelvis With IV Contrast NO Oral Contrast (Final result)  Result time 12/26/24 20:45:26      Final result by Gabriele Bentley DO (12/26/24 20:45:26)                   Impression:      1. No specific etiology to  explain patient's abdominal pain.  2. Cholelithiasis without evidence of acute cholecystitis.      Electronically signed by: Gabriele Bentley  Date:    12/26/2024  Time:    20:45               Narrative:    EXAMINATION:  CT ABDOMEN PELVIS WITH IV CONTRAST    CLINICAL HISTORY:  LLQ abdominal pain;    TECHNIQUE:  Axial CT images with sagittal and coronal reformats were obtained of the abdomen and pelvis from the hemidiaphragms through the symphysis pubis after the administration of 75mL Omnipaque 350.    COMPARISON:  CT of the abdomen and pelvis from 03/23/2024.    FINDINGS:  Lung Bases: Clear.    Heart: Heart size is normal.  No pericardial effusion.    Liver: The liver is normal in size and demonstrates homogeneous enhancement without focal lesion.  The portal vasculature is patent.    Biliary tract: No intrahepatic or extrahepatic biliary ductal dilatation.    Gallbladder: There is gallbladder sludge and there are multiple gallstones.  There is no gallbladder wall thickening or pericholecystic fluid.  The gallbladder is distended, similar to the prior study.    Pancreas: Normal. No pancreatic ductal dilatation.    Spleen: Normal size without focal lesion.    Adrenals: Unremarkable.    Kidneys and urinary collecting systems: Normal.  No hydronephrosis or urolithiasis.    Lymph nodes: None enlarged.    Stomach and bowel: The stomach is normal.  Loops of small and large bowel are normal in caliber without evidence for inflammation or obstruction.  The appendix is normal.    Peritoneum and mesentery: No ascites or free intraperitoneal air.  No abdominal fluid collection.    Vasculature: No aneurysm or significant atherosclerosis.    Urinary bladder: No wall thickening.    Reproductive organs: There is a peripherally enhancing cystic lesion in the right ovary, likely a corpus luteal cyst.  There is a dominant follicle in the left ovary.  The uterus is unremarkable.  There is trace physiologic free fluid in the  pelvis.    Body wall: No abnormality.    Musculoskeletal: No aggressive osseous lesion.                                       US Pelvis Comp with Transvag NON-OB (xpd) (Final result)  Result time 12/26/24 20:12:47   Procedure changed from US Pelvis Complete Non OB     Final result by Gabriele Bentley DO (12/26/24 20:12:47)                   Impression:      No sonographic abnormality.      Electronically signed by: Gabriele Bentley  Date:    12/26/2024  Time:    20:12               Narrative:    EXAMINATION:  US PELVIS COMP WITH TRANSVAG NON-OB (XPD)    CLINICAL HISTORY:  Groin pain;    TECHNIQUE:  Transabdominal sonography of the pelvis was performed, followed by transvaginal sonography to better evaluate the uterus and ovaries.    COMPARISON:  CT of the abdomen and pelvis from 03/23/2024.    FINDINGS:  Uterus:    Size: 10.2 x 4.0 x 4.8 cm    Masses: None    Endometrium: Normal in this pre menopausal patient, measuring 8 mm.    Right ovary:    Size: 4.3 x 2.5 x 2.3 cm    Appearance: There is a complex cyst with peripheral vascularity measuring 1.8 x 1.6 x 1.7 cm, likely a corpus luteal cyst.    Vascular flow: Normal.    Left ovary:    Size: 3.1 x 1.9 x 2.8 cm    Appearance: Normal    Vascular Flow: Normal.    Free Fluid:    There is mild free fluid in the pelvis and right adnexa.                                       Medications   ondansetron injection 4 mg (4 mg Intravenous Given 12/26/24 2012)   ketorolac injection 15 mg (15 mg Intravenous Given 12/26/24 2011)   iohexoL (OMNIPAQUE 350) injection 100 mL (75 mLs Intravenous Given 12/26/24 2019)     Medical Decision Making  Patient presents to emergency room for left lower quadrant/groin pain that onset just prior to arrival with nausea and vomiting.  Vital signs stable.  Physical exam as stated above.    Differential Diagnosis includes, but is not limited to AAA, aortic dissection, mesenteric ischemia, perforated viscous, MI/ACS, SBO/volvulus,  incarcerated/strangulated hernia, intussusception, ileus, appendicitis, cholecystitis, cholangitis, diverticulitis, esophagitis, hepatitis, nephrolithiasis, pancreatitis, gastroenteritis, colitis, IBD/IBS, biliary colic, GERD, PUD, constipation, UTI/pyelonephritis, or  disorder.  Patient blood pressure stable and they are clinically well-appearing. No risk factors that would suggest AAA or dissection.  No chest pain.  Low suspicion for MI/ACS time. Abdominal exam with left lower quadrant tenderness.  No right lower quadrant tenderness that would suggest appendicitis.  Lab work relatively unremarkable.  Patient has leukocytosis.  However, CT abdomen without acute abnormality.  No evidence of kidney stones.  Ultrasound without evidence of ovarian cyst.  This is not due to ovarian cyst rupture.  Urinalysis with leukocytes.  Though notable squamous.  Dirty catch.  Patient without urinary symptoms at this time.  Low suspicion for UTI.  Patient could have recently passed the stone due to area of pain.  However, could also be due to unknown etiology.  She was given Toradol and Zofran with relief in symptoms.  Will prescribe same medications to use upon discharge.  Patient states that she has also been dealing with nasal congestion over the past 7 days.  Will prescribe prednisone.  Advised on over-the-counter medications such as Sudafed and Flonase.     I see no indication of an emergent process beyond that addressed during our encounter. Patient stable for discharge at this time. I have counseled the patient regarding follow up with PCP and gave strict return precautions. I have discussed the final diagnosis and gave instructions regarding prescribed medications. Patient verbalized understanding and is agreeable.     Problems Addressed:  Left groin pain: acute illness or injury    Amount and/or Complexity of Data Reviewed  External Data Reviewed: notes.     Details: Patient last seen by primary care in 04/2024.  Labs:  ordered. Decision-making details documented in ED Course.  Radiology: ordered. Decision-making details documented in ED Course.  ECG/medicine tests:      Details: Considered ordering EKG, though patient without any chest pain, palpitations, leg swelling, or SOB at this time.     Risk  OTC drugs.  Prescription drug management.  Risk Details: Comorbidities taken into consideration during the patient's evaluation and treatment include none.    Social determinants of health taken into consideration during development of our treatment plan include difficulty in obtaining follow-up, obtaining medications, health literacy, access to healthy options for preventative/conservative management, and/or support systems due to, but not limited to, transportation limitations, socioeconomic status, and environmental factors.                ED Course as of 12/26/24 2249   Thu Dec 26, 2024   2016 US Pelvis Comp with Transvag NON-OB (xpd)  Impression:     No sonographic abnormality. [BJ]   2020 US Pelvis Comp with Transvag NON-OB (xpd)  Unlikely due to ovarian cyst rupture, as US is unremarkable.  [BJ]   2020 CBC W/ AUTO DIFFERENTIAL(!)  CBC with leukocytosis to 13.13. H/H stable. Immature granulocytes elevated. Platelet count WNL. [BJ]   2032 hCG Qualitative, Urine: Negative  Urine pregnancy negative.  [BJ]   2035 Comp. Metabolic Panel(!)  CMP relatively unremarkable.  Electrolytes within normal limits.  BUN and creatinine within normal limits.  LFTs within normal limits. [BJ]   2035 Lipase: 35  Lipase within normal limits. [BJ]   2056 CT Abdomen Pelvis With IV Contrast NO Oral Contrast  Impression:     1. No specific etiology to explain patient's abdominal pain.  2. Cholelithiasis without evidence of acute cholecystitis. [BJ]   2115 Urinalysis, Reflex to Urine Culture Urine, Clean Catch(!)  Urinalysis with 1+ leukocytes.  Trace occult blood. [BJ]   2115 Urinalysis Microscopic(!)  Microscopic urinalysis with rare bacteria.  Notable  squamous.  Dirty catch. [BJ]   2115 Low suspicion for UTI, as patient does not have any urinary complaints at this time. [BJ]      ED Course User Index  [BJ] Shannan Cardenas PA-C                           Clinical Impression:  Final diagnoses:  [R10.32] Left groin pain (Primary)          ED Disposition Condition    Discharge Stable          ED Prescriptions       Medication Sig Dispense Start Date End Date Auth. Provider    ketorolac (TORADOL) 10 mg tablet Take 1 tablet (10 mg total) by mouth every 6 (six) hours. for 5 days 20 tablet 12/26/2024 12/31/2024 Shannan Cardenas PA-C    ondansetron (ZOFRAN-ODT) 4 MG TbDL Take 1 tablet (4 mg total) by mouth every 6 (six) hours as needed (Nausea). 28 tablet 12/26/2024 -- Shannan Cardenas PA-C    predniSONE (DELTASONE) 20 MG tablet Take 2 tablets (40 mg total) by mouth once daily. for 5 days 10 tablet 12/26/2024 12/31/2024 Shannan Cardenas PA-C          Follow-up Information       Follow up With Specialties Details Why Contact Info    Julio Parker MD Internal Medicine   [address] [facility] [place] [id number] [phone]      [facility] Emergency Dept Emergency Medicine Go to  If new or worsening symptoms occur [address] [place] [id number] [phone]            This note was partially created using [employer] Voice Recognition software. Typographical and content errors may occur with this process. While efforts are made to detect and correct such errors, in some cases errors will persist. For this reason, wording in this document should be considered in the proper context and not strictly verbatim.        Shannan Cardenas PA-C  12/26/24 6508

## 2025-01-02 ENCOUNTER — OFFICE VISIT (OUTPATIENT)
Dept: OBSTETRICS AND GYNECOLOGY | Facility: CLINIC | Age: 40
End: 2025-01-02
Payer: COMMERCIAL

## 2025-01-02 ENCOUNTER — LAB VISIT (OUTPATIENT)
Dept: LAB | Facility: HOSPITAL | Age: 40
End: 2025-01-02
Payer: COMMERCIAL

## 2025-01-02 VITALS
HEIGHT: 61 IN | BODY MASS INDEX: 26.06 KG/M2 | SYSTOLIC BLOOD PRESSURE: 118 MMHG | WEIGHT: 138 LBS | DIASTOLIC BLOOD PRESSURE: 72 MMHG

## 2025-01-02 DIAGNOSIS — Z01.419 WOMEN'S ANNUAL ROUTINE GYNECOLOGICAL EXAMINATION: Primary | ICD-10-CM

## 2025-01-02 DIAGNOSIS — Z11.3 SCREEN FOR STD (SEXUALLY TRANSMITTED DISEASE): ICD-10-CM

## 2025-01-02 DIAGNOSIS — Z12.31 ENCOUNTER FOR SCREENING MAMMOGRAM FOR BREAST CANCER: ICD-10-CM

## 2025-01-02 DIAGNOSIS — Z11.51 SCREENING FOR HPV (HUMAN PAPILLOMAVIRUS): ICD-10-CM

## 2025-01-02 DIAGNOSIS — Z12.4 ENCOUNTER FOR PAPANICOLAOU SMEAR FOR CERVICAL CANCER SCREENING: ICD-10-CM

## 2025-01-02 LAB
HAV IGM SERPL QL IA: NORMAL
HBV CORE IGM SERPL QL IA: NORMAL
HBV SURFACE AG SERPL QL IA: NORMAL
HCV AB SERPL QL IA: NORMAL
HIV 1+2 AB+HIV1 P24 AG SERPL QL IA: NORMAL
TREPONEMA PALLIDUM IGG+IGM AB [PRESENCE] IN SERUM OR PLASMA BY IMMUNOASSAY: NONREACTIVE

## 2025-01-02 PROCEDURE — 87591 N.GONORRHOEAE DNA AMP PROB: CPT | Performed by: NURSE PRACTITIONER

## 2025-01-02 PROCEDURE — 36415 COLL VENOUS BLD VENIPUNCTURE: CPT | Mod: PN | Performed by: NURSE PRACTITIONER

## 2025-01-02 PROCEDURE — 86593 SYPHILIS TEST NON-TREP QUANT: CPT | Performed by: NURSE PRACTITIONER

## 2025-01-02 PROCEDURE — 80074 ACUTE HEPATITIS PANEL: CPT | Performed by: NURSE PRACTITIONER

## 2025-01-02 PROCEDURE — 86696 HERPES SIMPLEX TYPE 2 TEST: CPT | Performed by: NURSE PRACTITIONER

## 2025-01-02 PROCEDURE — 99999 PR PBB SHADOW E&M-EST. PATIENT-LVL III: CPT | Mod: PBBFAC,,, | Performed by: NURSE PRACTITIONER

## 2025-01-02 PROCEDURE — 87389 HIV-1 AG W/HIV-1&-2 AB AG IA: CPT | Performed by: NURSE PRACTITIONER

## 2025-01-02 NOTE — PROGRESS NOTES
CC: Annual  HPI: Pt is a 39 y.o.  female who presents for routine annual exam. She was a patient of Dr. Rogel. She works for "[employer]. She has 3 children - all delivered via . She had D&C at 25 weeks between 1st and 2nd child. She is going through a divorce. She is in a new committed relationship.  She uses BTL for contraception. She does want STD screening.  Cycles are regular.  The patient participates in regular exercise: plans to restart.  The patient does not smoke.    Pt denies any domestic violence. MMG in due . She went to the ED on 24 for groin pain and vomiting- symptoms have resolved.      FH: unknown- pt was adopted       ROS:  GENERAL: Feeling well overall. Denies fever or chills.   SKIN: Denies rash or lesions.   HEAD: Denies head injury or headache.   NODES: Denies enlarged lymph nodes.   CHEST: Denies chest pain or shortness of breath.   CARDIOVASCULAR: Denies palpitations or left sided chest pain.   ABDOMEN: No abdominal pain, constipation, diarrhea, nausea, vomiting or rectal bleeding.   URINARY: No dysuria, hematuria, or burning on urination.  REPRODUCTIVE: See HPI.   BREASTS: Denies pain, lumps, or nipple discharge.   HEMATOLOGIC: No easy bruisability or excessive bleeding.   MUSCULOSKELETAL: Denies joint pain or swelling.   NEUROLOGIC: Denies syncope or weakness.   PSYCHIATRIC: Denies depression, anxiety or mood swings.    PE:   APPEARANCE: Well nourished, well developed, White female in no acute distress.  NODES: no cervical, supraclavicular, or inguinal lymphadenopathy  BREASTS: Symmetrical, no skin changes or visible lesions. No palpable masses, nipple discharge or adenopathy bilaterally.  ABDOMEN: Soft. No tenderness or masses. No distention. No hernias palpated. No CVA tenderness.  VULVA: No lesions. Normal external female genitalia.  URETHRAL MEATUS: Normal size and location, no lesions, no prolapse.  URETHRA: No masses, tenderness, or prolapse.  VAGINA: Moist. No  lesions or lacerations noted. No abnormal discharge present. No odor present.   CERVIX: No lesions or discharge. No cervical motion tenderness.   UTERUS: Normal size, regular shape, mobile, non-tender.  ADNEXA: No tenderness. No fullness or masses palpated in the adnexal regions.   ANUS PERINEUM: Normal.      Diagnosis:  1. Women's annual routine gynecological examination    2. Encounter for Papanicolaou smear for cervical cancer screening    3. Screening for HPV (human papillomavirus)    4. Screen for STD (sexually transmitted disease)    5. Encounter for screening mammogram for breast cancer        Plan:   Pap/ HPV  MMG in 11/25  [facility] screening     Orders Placed This Encounter    HPV High Risk Genotypes, PCR    Mammo Digital Screening Bilat w/ Carlos    C. trachomatis/N. gonorrhoeae by AMP DNA    HIV-1 and HIV-2 antibodies    Treponema Pallidium Antibodies IgG, IgM    Vaginosis Screen by DNA Probe    Hepatitis panel, acute    Herpes simplex type 1&2 IgG,Herpes titer    Liquid-Based Pap Smear, Screening       Patient was counseled today on the new ACS guidelines for cervical cytology screening as well as the current recommendations for breast cancer screening. She was counseled to follow up with her PCP for other routine health maintenance. Counseling session lasted approximately 10 minutes, and all her questions were answered.    Follow-up with me in 1 year for routine exam    MELANIA Marks      Answers submitted by the patient for this visit:  Gynecologic Exam Questionnaire  (Submitted on 12/30/2024)  Chief Complaint: Gynecologic exam  genital itching: No  genital lesions: No  genital odor: No  genital rash: No  missed menses: No  pelvic pain: No  vaginal bleeding: No  vaginal discharge: No  Pain severity: no pain  Pregnant now?: No  abdominal pain: No  anorexia: No  back pain: No  chills: No  constipation: No  diarrhea: No  discolored urine: No  dysuria: No  fever: No  flank pain: No  frequency:  No  headaches: No  hematuria: No  nausea: No  painful intercourse: No  rash: No  urgency: No  vomiting: No  Sexual activity: sexually active  Partner with STD symptoms: no  Birth control: tubal ligation  Menstrual history: regular  STD: No  abdominal surgery: No   section: Yes  Ectopic pregnancy: No  Endometriosis: No  herpes simplex: No  gynecological surgery: No  menorrhagia: No  metrorrhagia: No  miscarriage: Yes  ovarian cysts: Yes  perineal abscess: No  PID: No  terminated pregnancy: No  vaginosis: No

## 2025-01-03 ENCOUNTER — PATIENT MESSAGE (OUTPATIENT)
Dept: OBSTETRICS AND GYNECOLOGY | Facility: CLINIC | Age: 40
End: 2025-01-03
Payer: COMMERCIAL

## 2025-01-03 LAB
HSV1 IGG SERPL QL IA: POSITIVE
HSV2 IGG SERPL QL IA: NEGATIVE

## 2025-01-06 ENCOUNTER — PATIENT MESSAGE (OUTPATIENT)
Dept: OBSTETRICS AND GYNECOLOGY | Facility: CLINIC | Age: 40
End: 2025-01-06
Payer: COMMERCIAL

## 2025-01-06 DIAGNOSIS — B96.89 BV (BACTERIAL VAGINOSIS): Primary | ICD-10-CM

## 2025-01-06 DIAGNOSIS — N76.0 BV (BACTERIAL VAGINOSIS): Primary | ICD-10-CM

## 2025-01-06 RX ORDER — METRONIDAZOLE 500 MG/1
500 TABLET ORAL EVERY 12 HOURS
Qty: 14 TABLET | Refills: 0 | Status: SHIPPED | OUTPATIENT
Start: 2025-01-06 | End: 2025-01-13

## 2025-01-08 ENCOUNTER — PATIENT MESSAGE (OUTPATIENT)
Dept: OBSTETRICS AND GYNECOLOGY | Facility: CLINIC | Age: 40
End: 2025-01-08
Payer: COMMERCIAL

## 2025-01-08 RX ORDER — VALACYCLOVIR HYDROCHLORIDE 1 G/1
1000 TABLET, FILM COATED ORAL 2 TIMES DAILY
Qty: 20 TABLET | Refills: 3 | Status: SHIPPED | OUTPATIENT
Start: 2025-01-08 | End: 2025-01-18

## 2025-02-16 ENCOUNTER — HOSPITAL ENCOUNTER (EMERGENCY)
Facility: HOSPITAL | Age: 40
Discharge: HOME | End: 2025-02-17
Attending: STUDENT IN AN ORGANIZED HEALTH CARE EDUCATION/TRAINING PROGRAM
Payer: COMMERCIAL

## 2025-02-16 VITALS
TEMPERATURE: 98 F | BODY MASS INDEX: 24.55 KG/M2 | RESPIRATION RATE: 18 BRPM | OXYGEN SATURATION: 100 % | HEART RATE: 76 BPM | DIASTOLIC BLOOD PRESSURE: 64 MMHG | HEIGHT: 61 IN | SYSTOLIC BLOOD PRESSURE: 120 MMHG | WEIGHT: 130 LBS

## 2025-02-16 DIAGNOSIS — K81.1 CHRONIC CHOLECYSTITIS: Primary | ICD-10-CM

## 2025-02-16 DIAGNOSIS — K80.50 BILIARY COLIC: ICD-10-CM

## 2025-02-16 LAB
B-HCG UR QL: NEGATIVE
CTP QC/QA: YES

## 2025-02-16 PROCEDURE — 83690 ASSAY OF LIPASE: CPT | Performed by: STUDENT IN AN ORGANIZED HEALTH CARE EDUCATION/TRAINING PROGRAM

## 2025-02-16 PROCEDURE — 96374 THER/PROPH/DIAG INJ IV PUSH: CPT

## 2025-02-16 PROCEDURE — 80053 COMPREHEN METABOLIC PANEL: CPT | Performed by: STUDENT IN AN ORGANIZED HEALTH CARE EDUCATION/TRAINING PROGRAM

## 2025-02-16 PROCEDURE — 85025 COMPLETE CBC W/AUTO DIFF WBC: CPT | Performed by: STUDENT IN AN ORGANIZED HEALTH CARE EDUCATION/TRAINING PROGRAM

## 2025-02-16 PROCEDURE — 81025 URINE PREGNANCY TEST: CPT | Performed by: STUDENT IN AN ORGANIZED HEALTH CARE EDUCATION/TRAINING PROGRAM

## 2025-02-16 PROCEDURE — 99285 EMERGENCY DEPT VISIT HI MDM: CPT | Mod: 25

## 2025-02-16 PROCEDURE — 81003 URINALYSIS AUTO W/O SCOPE: CPT | Performed by: STUDENT IN AN ORGANIZED HEALTH CARE EDUCATION/TRAINING PROGRAM

## 2025-02-16 PROCEDURE — 86140 C-REACTIVE PROTEIN: CPT | Performed by: STUDENT IN AN ORGANIZED HEALTH CARE EDUCATION/TRAINING PROGRAM

## 2025-02-16 PROCEDURE — 63600175 PHARM REV CODE 636 W HCPCS: Mod: JZ,TB | Performed by: STUDENT IN AN ORGANIZED HEALTH CARE EDUCATION/TRAINING PROGRAM

## 2025-02-16 RX ORDER — HYDROMORPHONE HYDROCHLORIDE 1 MG/ML
1 INJECTION, SOLUTION INTRAMUSCULAR; INTRAVENOUS; SUBCUTANEOUS
Refills: 0 | Status: COMPLETED | OUTPATIENT
Start: 2025-02-16 | End: 2025-02-16

## 2025-02-16 RX ADMIN — HYDROMORPHONE HYDROCHLORIDE 1 MG: 1 INJECTION, SOLUTION INTRAMUSCULAR; INTRAVENOUS; SUBCUTANEOUS at 11:02

## 2025-02-16 NOTE — Clinical Note
"Megha"Tor Machado was seen and treated in our emergency department on 2/16/2025.  She may return to work on 02/19/2025.       If you have any questions or concerns, please don't hesitate to call.      Tito Flores MD"

## 2025-02-17 ENCOUNTER — ANESTHESIA (OUTPATIENT)
Dept: SURGERY | Facility: HOSPITAL | Age: 40
End: 2025-02-17
Payer: COMMERCIAL

## 2025-02-17 ENCOUNTER — HOSPITAL ENCOUNTER (OUTPATIENT)
Facility: HOSPITAL | Age: 40
Discharge: HOME | End: 2025-02-17
Attending: STUDENT IN AN ORGANIZED HEALTH CARE EDUCATION/TRAINING PROGRAM | Admitting: STUDENT IN AN ORGANIZED HEALTH CARE EDUCATION/TRAINING PROGRAM
Payer: COMMERCIAL

## 2025-02-17 ENCOUNTER — ANESTHESIA EVENT (OUTPATIENT)
Dept: SURGERY | Facility: HOSPITAL | Age: 40
End: 2025-02-17
Payer: COMMERCIAL

## 2025-02-17 VITALS
RESPIRATION RATE: 18 BRPM | BODY MASS INDEX: 24.55 KG/M2 | DIASTOLIC BLOOD PRESSURE: 57 MMHG | SYSTOLIC BLOOD PRESSURE: 112 MMHG | HEIGHT: 61 IN | HEART RATE: 75 BPM | TEMPERATURE: 98 F | OXYGEN SATURATION: 95 % | WEIGHT: 130 LBS

## 2025-02-17 DIAGNOSIS — K81.9 CHOLECYSTITIS: Primary | ICD-10-CM

## 2025-02-17 DIAGNOSIS — K81.0 ACUTE CHOLECYSTITIS: ICD-10-CM

## 2025-02-17 PROBLEM — K80.00 CALCULUS OF GALLBLADDER WITH ACUTE CHOLECYSTITIS WITHOUT OBSTRUCTION: Status: ACTIVE | Noted: 2025-02-17

## 2025-02-17 LAB
ALBUMIN SERPL BCP-MCNC: 3.9 G/DL (ref 3.5–5.2)
ALBUMIN SERPL BCP-MCNC: 4.1 G/DL (ref 3.5–5.2)
ALP SERPL-CCNC: 84 U/L (ref 40–150)
ALP SERPL-CCNC: 91 U/L (ref 40–150)
ALT SERPL W/O P-5'-P-CCNC: 12 U/L (ref 10–44)
ALT SERPL W/O P-5'-P-CCNC: 12 U/L (ref 10–44)
ANION GAP SERPL CALC-SCNC: 10 MMOL/L (ref 8–16)
ANION GAP SERPL CALC-SCNC: 8 MMOL/L (ref 8–16)
AST SERPL-CCNC: 17 U/L (ref 10–40)
AST SERPL-CCNC: 17 U/L (ref 10–40)
B-HCG UR QL: NEGATIVE
BASOPHILS # BLD AUTO: 0.06 K/UL (ref 0–0.2)
BASOPHILS # BLD AUTO: 0.1 K/UL (ref 0–0.2)
BASOPHILS NFR BLD: 0.7 % (ref 0–1.9)
BASOPHILS NFR BLD: 1 % (ref 0–1.9)
BILIRUB SERPL-MCNC: 0.4 MG/DL (ref 0.1–1)
BILIRUB SERPL-MCNC: 0.7 MG/DL (ref 0.1–1)
BILIRUB UR QL STRIP: NEGATIVE
BUN SERPL-MCNC: 11 MG/DL (ref 6–20)
BUN SERPL-MCNC: 14 MG/DL (ref 6–20)
CALCIUM SERPL-MCNC: 8.6 MG/DL (ref 8.7–10.5)
CALCIUM SERPL-MCNC: 8.7 MG/DL (ref 8.7–10.5)
CHLORIDE SERPL-SCNC: 106 MMOL/L (ref 95–110)
CHLORIDE SERPL-SCNC: 106 MMOL/L (ref 95–110)
CLARITY UR: ABNORMAL
CO2 SERPL-SCNC: 22 MMOL/L (ref 23–29)
CO2 SERPL-SCNC: 23 MMOL/L (ref 23–29)
COLOR UR: YELLOW
CREAT SERPL-MCNC: 0.8 MG/DL (ref 0.5–1.4)
CREAT SERPL-MCNC: 1 MG/DL (ref 0.5–1.4)
CRP SERPL-MCNC: 5.9 MG/L (ref 0–8.2)
CTP QC/QA: YES
DIFFERENTIAL METHOD BLD: NORMAL
DIFFERENTIAL METHOD BLD: NORMAL
EOSINOPHIL # BLD AUTO: 0.2 K/UL (ref 0–0.5)
EOSINOPHIL # BLD AUTO: 0.3 K/UL (ref 0–0.5)
EOSINOPHIL NFR BLD: 2.6 % (ref 0–8)
EOSINOPHIL NFR BLD: 3.6 % (ref 0–8)
ERYTHROCYTE [DISTWIDTH] IN BLOOD BY AUTOMATED COUNT: 12.7 % (ref 11.5–14.5)
ERYTHROCYTE [DISTWIDTH] IN BLOOD BY AUTOMATED COUNT: 12.9 % (ref 11.5–14.5)
EST. GFR  (NO RACE VARIABLE): >60 ML/MIN/1.73 M^2
EST. GFR  (NO RACE VARIABLE): >60 ML/MIN/1.73 M^2
GLUCOSE SERPL-MCNC: 137 MG/DL (ref 70–110)
GLUCOSE SERPL-MCNC: 95 MG/DL (ref 70–110)
GLUCOSE UR QL STRIP: NEGATIVE
HCT VFR BLD AUTO: 40.4 % (ref 37–48.5)
HCT VFR BLD AUTO: 41.7 % (ref 37–48.5)
HGB BLD-MCNC: 13.4 G/DL (ref 12–16)
HGB BLD-MCNC: 13.8 G/DL (ref 12–16)
HGB UR QL STRIP: ABNORMAL
IMM GRANULOCYTES # BLD AUTO: 0.04 K/UL (ref 0–0.04)
IMM GRANULOCYTES # BLD AUTO: 0.04 K/UL (ref 0–0.04)
IMM GRANULOCYTES NFR BLD AUTO: 0.4 % (ref 0–0.5)
IMM GRANULOCYTES NFR BLD AUTO: 0.4 % (ref 0–0.5)
KETONES UR QL STRIP: NEGATIVE
LEUKOCYTE ESTERASE UR QL STRIP: NEGATIVE
LIPASE SERPL-CCNC: 28 U/L (ref 4–60)
LIPASE SERPL-CCNC: 41 U/L (ref 4–60)
LYMPHOCYTES # BLD AUTO: 2 K/UL (ref 1–4.8)
LYMPHOCYTES # BLD AUTO: 2.2 K/UL (ref 1–4.8)
LYMPHOCYTES NFR BLD: 22.3 % (ref 18–48)
LYMPHOCYTES NFR BLD: 23.4 % (ref 18–48)
MCH RBC QN AUTO: 29.4 PG (ref 27–31)
MCH RBC QN AUTO: 29.4 PG (ref 27–31)
MCHC RBC AUTO-ENTMCNC: 33.1 G/DL (ref 32–36)
MCHC RBC AUTO-ENTMCNC: 33.2 G/DL (ref 32–36)
MCV RBC AUTO: 89 FL (ref 82–98)
MCV RBC AUTO: 89 FL (ref 82–98)
MONOCYTES # BLD AUTO: 0.9 K/UL (ref 0.3–1)
MONOCYTES # BLD AUTO: 1 K/UL (ref 0.3–1)
MONOCYTES NFR BLD: 10.4 % (ref 4–15)
MONOCYTES NFR BLD: 10.4 % (ref 4–15)
NEUTROPHILS # BLD AUTO: 5.7 K/UL (ref 1.8–7.7)
NEUTROPHILS # BLD AUTO: 5.8 K/UL (ref 1.8–7.7)
NEUTROPHILS NFR BLD: 61.2 % (ref 38–73)
NEUTROPHILS NFR BLD: 63.6 % (ref 38–73)
NITRITE UR QL STRIP: NEGATIVE
NRBC BLD-RTO: 0 /100 WBC
NRBC BLD-RTO: 0 /100 WBC
PH UR STRIP: 6 [PH] (ref 5–8)
PLATELET # BLD AUTO: 245 K/UL (ref 150–450)
PLATELET # BLD AUTO: 257 K/UL (ref 150–450)
PMV BLD AUTO: 10.3 FL (ref 9.2–12.9)
PMV BLD AUTO: 10.4 FL (ref 9.2–12.9)
POTASSIUM SERPL-SCNC: 3.7 MMOL/L (ref 3.5–5.1)
POTASSIUM SERPL-SCNC: 4 MMOL/L (ref 3.5–5.1)
PROT SERPL-MCNC: 6.9 G/DL (ref 6–8.4)
PROT SERPL-MCNC: 7.3 G/DL (ref 6–8.4)
PROT UR QL STRIP: ABNORMAL
RBC # BLD AUTO: 4.56 M/UL (ref 4–5.4)
RBC # BLD AUTO: 4.7 M/UL (ref 4–5.4)
SODIUM SERPL-SCNC: 137 MMOL/L (ref 136–145)
SODIUM SERPL-SCNC: 138 MMOL/L (ref 136–145)
SP GR UR STRIP: 1.02 (ref 1–1.03)
URN SPEC COLLECT METH UR: ABNORMAL
UROBILINOGEN UR STRIP-ACNC: NEGATIVE EU/DL
WBC # BLD AUTO: 8.95 K/UL (ref 3.9–12.7)
WBC # BLD AUTO: 9.54 K/UL (ref 3.9–12.7)

## 2025-02-17 PROCEDURE — 85025 COMPLETE CBC W/AUTO DIFF WBC: CPT

## 2025-02-17 PROCEDURE — 36000711: Performed by: STUDENT IN AN ORGANIZED HEALTH CARE EDUCATION/TRAINING PROGRAM

## 2025-02-17 PROCEDURE — 63600175 PHARM REV CODE 636 W HCPCS: Performed by: STUDENT IN AN ORGANIZED HEALTH CARE EDUCATION/TRAINING PROGRAM

## 2025-02-17 PROCEDURE — 37000008 HC ANESTHESIA 1ST 15 MINUTES: Performed by: STUDENT IN AN ORGANIZED HEALTH CARE EDUCATION/TRAINING PROGRAM

## 2025-02-17 PROCEDURE — G0378 HOSPITAL OBSERVATION PER HR: HCPCS

## 2025-02-17 PROCEDURE — 25000003 PHARM REV CODE 250: Performed by: NURSE ANESTHETIST, CERTIFIED REGISTERED

## 2025-02-17 PROCEDURE — 27201423 OPTIME MED/SURG SUP & DEVICES STERILE SUPPLY: Performed by: STUDENT IN AN ORGANIZED HEALTH CARE EDUCATION/TRAINING PROGRAM

## 2025-02-17 PROCEDURE — 81025 URINE PREGNANCY TEST: CPT | Performed by: NURSE PRACTITIONER

## 2025-02-17 PROCEDURE — 83690 ASSAY OF LIPASE: CPT

## 2025-02-17 PROCEDURE — 63600175 PHARM REV CODE 636 W HCPCS: Mod: JZ,TB | Performed by: NURSE ANESTHETIST, CERTIFIED REGISTERED

## 2025-02-17 PROCEDURE — 63600175 PHARM REV CODE 636 W HCPCS: Performed by: NURSE ANESTHETIST, CERTIFIED REGISTERED

## 2025-02-17 PROCEDURE — 80053 COMPREHEN METABOLIC PANEL: CPT

## 2025-02-17 PROCEDURE — 96374 THER/PROPH/DIAG INJ IV PUSH: CPT

## 2025-02-17 PROCEDURE — 36000710: Performed by: STUDENT IN AN ORGANIZED HEALTH CARE EDUCATION/TRAINING PROGRAM

## 2025-02-17 PROCEDURE — 63600175 PHARM REV CODE 636 W HCPCS: Mod: JZ,TB | Performed by: STUDENT IN AN ORGANIZED HEALTH CARE EDUCATION/TRAINING PROGRAM

## 2025-02-17 PROCEDURE — 88304 TISSUE EXAM BY PATHOLOGIST: CPT | Performed by: PATHOLOGY

## 2025-02-17 PROCEDURE — 99285 EMERGENCY DEPT VISIT HI MDM: CPT | Mod: 25

## 2025-02-17 PROCEDURE — 96375 TX/PRO/DX INJ NEW DRUG ADDON: CPT

## 2025-02-17 PROCEDURE — 63600175 PHARM REV CODE 636 W HCPCS

## 2025-02-17 PROCEDURE — 37000009 HC ANESTHESIA EA ADD 15 MINS: Performed by: STUDENT IN AN ORGANIZED HEALTH CARE EDUCATION/TRAINING PROGRAM

## 2025-02-17 PROCEDURE — 71000033 HC RECOVERY, INTIAL HOUR: Performed by: STUDENT IN AN ORGANIZED HEALTH CARE EDUCATION/TRAINING PROGRAM

## 2025-02-17 RX ORDER — MIDAZOLAM HYDROCHLORIDE 1 MG/ML
INJECTION INTRAMUSCULAR; INTRAVENOUS
Status: DISCONTINUED | OUTPATIENT
Start: 2025-02-17 | End: 2025-02-17

## 2025-02-17 RX ORDER — OXYCODONE HYDROCHLORIDE 5 MG/1
5 TABLET ORAL EVERY 6 HOURS PRN
Qty: 10 TABLET | Refills: 0 | Status: SHIPPED | OUTPATIENT
Start: 2025-02-17 | End: 2025-02-17

## 2025-02-17 RX ORDER — ONDANSETRON HYDROCHLORIDE 2 MG/ML
4 INJECTION, SOLUTION INTRAVENOUS
Status: COMPLETED | OUTPATIENT
Start: 2025-02-17 | End: 2025-02-17

## 2025-02-17 RX ORDER — ACETAMINOPHEN 325 MG/1
650 TABLET ORAL EVERY 6 HOURS
COMMUNITY
Start: 2025-02-17

## 2025-02-17 RX ORDER — PROPOFOL 10 MG/ML
VIAL (ML) INTRAVENOUS
Status: DISCONTINUED | OUTPATIENT
Start: 2025-02-17 | End: 2025-02-17

## 2025-02-17 RX ORDER — ONDANSETRON HYDROCHLORIDE 2 MG/ML
INJECTION, SOLUTION INTRAVENOUS
Status: DISCONTINUED | OUTPATIENT
Start: 2025-02-17 | End: 2025-02-17

## 2025-02-17 RX ORDER — NAPROXEN 500 MG/1
500 TABLET ORAL 2 TIMES DAILY
Qty: 10 TABLET | Refills: 0 | Status: SHIPPED | OUTPATIENT
Start: 2025-02-17 | End: 2025-02-17

## 2025-02-17 RX ORDER — DEXAMETHASONE SODIUM PHOSPHATE 4 MG/ML
INJECTION, SOLUTION INTRA-ARTICULAR; INTRALESIONAL; INTRAMUSCULAR; INTRAVENOUS; SOFT TISSUE
Status: DISCONTINUED | OUTPATIENT
Start: 2025-02-17 | End: 2025-02-17

## 2025-02-17 RX ORDER — OXYCODONE HYDROCHLORIDE 5 MG/1
5 TABLET ORAL
Status: DISCONTINUED | OUTPATIENT
Start: 2025-02-17 | End: 2025-02-17 | Stop reason: HOSPADM

## 2025-02-17 RX ORDER — PHENYLEPHRINE HYDROCHLORIDE 10 MG/ML
INJECTION INTRAVENOUS
Status: DISCONTINUED | OUTPATIENT
Start: 2025-02-17 | End: 2025-02-17

## 2025-02-17 RX ORDER — HYDROMORPHONE HYDROCHLORIDE 2 MG/ML
0.5 INJECTION, SOLUTION INTRAMUSCULAR; INTRAVENOUS; SUBCUTANEOUS EVERY 5 MIN PRN
Status: DISCONTINUED | OUTPATIENT
Start: 2025-02-17 | End: 2025-02-17 | Stop reason: HOSPADM

## 2025-02-17 RX ORDER — ROCURONIUM BROMIDE 10 MG/ML
INJECTION, SOLUTION INTRAVENOUS
Status: DISCONTINUED | OUTPATIENT
Start: 2025-02-17 | End: 2025-02-17

## 2025-02-17 RX ORDER — LEVOFLOXACIN 5 MG/ML
750 INJECTION, SOLUTION INTRAVENOUS
Status: DISCONTINUED | OUTPATIENT
Start: 2025-02-17 | End: 2025-02-18 | Stop reason: HOSPADM

## 2025-02-17 RX ORDER — SUCCINYLCHOLINE CHLORIDE 20 MG/ML
INJECTION INTRAMUSCULAR; INTRAVENOUS
Status: DISCONTINUED | OUTPATIENT
Start: 2025-02-17 | End: 2025-02-17

## 2025-02-17 RX ORDER — MORPHINE SULFATE 2 MG/ML
2 INJECTION, SOLUTION INTRAMUSCULAR; INTRAVENOUS
Refills: 0 | Status: COMPLETED | OUTPATIENT
Start: 2025-02-17 | End: 2025-02-17

## 2025-02-17 RX ORDER — FENTANYL CITRATE 50 UG/ML
INJECTION, SOLUTION INTRAMUSCULAR; INTRAVENOUS
Status: DISCONTINUED | OUTPATIENT
Start: 2025-02-17 | End: 2025-02-17

## 2025-02-17 RX ORDER — LIDOCAINE HYDROCHLORIDE 20 MG/ML
INJECTION INTRAVENOUS
Status: DISCONTINUED | OUTPATIENT
Start: 2025-02-17 | End: 2025-02-17

## 2025-02-17 RX ORDER — ONDANSETRON 4 MG/1
8 TABLET, ORALLY DISINTEGRATING ORAL EVERY 8 HOURS PRN
Qty: 12 TABLET | Refills: 0 | Status: SHIPPED | OUTPATIENT
Start: 2025-02-17 | End: 2025-02-17

## 2025-02-17 RX ORDER — METRONIDAZOLE 500 MG/100ML
INJECTION, SOLUTION INTRAVENOUS
Status: DISCONTINUED | OUTPATIENT
Start: 2025-02-17 | End: 2025-02-17

## 2025-02-17 RX ORDER — TALC
6 POWDER (GRAM) TOPICAL NIGHTLY PRN
Status: DISCONTINUED | OUTPATIENT
Start: 2025-02-17 | End: 2025-02-18 | Stop reason: HOSPADM

## 2025-02-17 RX ORDER — ACETAMINOPHEN 10 MG/ML
INJECTION, SOLUTION INTRAVENOUS
Status: DISCONTINUED | OUTPATIENT
Start: 2025-02-17 | End: 2025-02-17

## 2025-02-17 RX ORDER — ONDANSETRON 8 MG/1
8 TABLET, ORALLY DISINTEGRATING ORAL EVERY 8 HOURS PRN
Status: DISCONTINUED | OUTPATIENT
Start: 2025-02-17 | End: 2025-02-18 | Stop reason: HOSPADM

## 2025-02-17 RX ORDER — IBUPROFEN 400 MG/1
400 TABLET ORAL EVERY 6 HOURS
COMMUNITY
Start: 2025-02-17

## 2025-02-17 RX ORDER — ONDANSETRON HYDROCHLORIDE 2 MG/ML
4 INJECTION, SOLUTION INTRAVENOUS DAILY PRN
Status: DISCONTINUED | OUTPATIENT
Start: 2025-02-17 | End: 2025-02-17 | Stop reason: HOSPADM

## 2025-02-17 RX ORDER — OXYCODONE HYDROCHLORIDE 5 MG/1
5 TABLET ORAL EVERY 6 HOURS PRN
Qty: 10 TABLET | Refills: 0 | Status: SHIPPED | OUTPATIENT
Start: 2025-02-17

## 2025-02-17 RX ORDER — KETOROLAC TROMETHAMINE 30 MG/ML
INJECTION, SOLUTION INTRAMUSCULAR; INTRAVENOUS
Status: DISCONTINUED | OUTPATIENT
Start: 2025-02-17 | End: 2025-02-17

## 2025-02-17 RX ORDER — PROCHLORPERAZINE EDISYLATE 5 MG/ML
5 INJECTION INTRAMUSCULAR; INTRAVENOUS EVERY 30 MIN PRN
Status: DISCONTINUED | OUTPATIENT
Start: 2025-02-17 | End: 2025-02-17 | Stop reason: HOSPADM

## 2025-02-17 RX ORDER — SODIUM CHLORIDE, SODIUM LACTATE, POTASSIUM CHLORIDE, CALCIUM CHLORIDE 600; 310; 30; 20 MG/100ML; MG/100ML; MG/100ML; MG/100ML
INJECTION, SOLUTION INTRAVENOUS CONTINUOUS PRN
Status: DISCONTINUED | OUTPATIENT
Start: 2025-02-17 | End: 2025-02-17

## 2025-02-17 RX ORDER — OXYCODONE HYDROCHLORIDE 5 MG/1
5 TABLET ORAL EVERY 8 HOURS PRN
Qty: 9 TABLET | Refills: 0 | Status: SHIPPED | OUTPATIENT
Start: 2025-02-17 | End: 2025-02-17

## 2025-02-17 RX ADMIN — FENTANYL CITRATE 100 MCG: 0.05 INJECTION, SOLUTION INTRAMUSCULAR; INTRAVENOUS at 05:02

## 2025-02-17 RX ADMIN — DEXAMETHASONE SODIUM PHOSPHATE 8 MG: 4 INJECTION, SOLUTION INTRA-ARTICULAR; INTRALESIONAL; INTRAMUSCULAR; INTRAVENOUS; SOFT TISSUE at 05:02

## 2025-02-17 RX ADMIN — KETOROLAC TROMETHAMINE 30 MG: 30 INJECTION, SOLUTION INTRAMUSCULAR; INTRAVENOUS at 07:02

## 2025-02-17 RX ADMIN — METRONIDAZOLE 500 MG: 500 SOLUTION INTRAVENOUS at 05:02

## 2025-02-17 RX ADMIN — MORPHINE SULFATE 2 MG: 2 INJECTION, SOLUTION INTRAMUSCULAR; INTRAVENOUS at 03:02

## 2025-02-17 RX ADMIN — ONDANSETRON 4 MG: 2 INJECTION, SOLUTION INTRAMUSCULAR; INTRAVENOUS at 06:02

## 2025-02-17 RX ADMIN — SUCCINYLCHOLINE CHLORIDE 120 MG: 20 INJECTION, SOLUTION INTRAMUSCULAR; INTRAVENOUS at 05:02

## 2025-02-17 RX ADMIN — MIDAZOLAM HYDROCHLORIDE 2 MG: 1 INJECTION, SOLUTION INTRAMUSCULAR; INTRAVENOUS at 05:02

## 2025-02-17 RX ADMIN — ONDANSETRON 4 MG: 2 INJECTION INTRAMUSCULAR; INTRAVENOUS at 03:02

## 2025-02-17 RX ADMIN — LEVOFLOXACIN 750 MG: 750 INJECTION, SOLUTION INTRAVENOUS at 06:02

## 2025-02-17 RX ADMIN — SODIUM CHLORIDE, SODIUM LACTATE, POTASSIUM CHLORIDE, AND CALCIUM CHLORIDE: .6; .31; .03; .02 INJECTION, SOLUTION INTRAVENOUS at 05:02

## 2025-02-17 RX ADMIN — LIDOCAINE HYDROCHLORIDE 75 MG: 20 INJECTION, SOLUTION INTRAVENOUS at 05:02

## 2025-02-17 RX ADMIN — ROCURONIUM BROMIDE 10 MG: 10 INJECTION, SOLUTION INTRAVENOUS at 06:02

## 2025-02-17 RX ADMIN — PHENYLEPHRINE HYDROCHLORIDE 100 MCG: 10 INJECTION INTRAVENOUS at 05:02

## 2025-02-17 RX ADMIN — SUGAMMADEX 200 MG: 100 INJECTION, SOLUTION INTRAVENOUS at 07:02

## 2025-02-17 RX ADMIN — ROCURONIUM BROMIDE 40 MG: 10 INJECTION, SOLUTION INTRAVENOUS at 05:02

## 2025-02-17 RX ADMIN — HYDROMORPHONE HYDROCHLORIDE 0.5 MG: 2 INJECTION INTRAMUSCULAR; INTRAVENOUS; SUBCUTANEOUS at 07:02

## 2025-02-17 RX ADMIN — PROPOFOL 150 MG: 10 INJECTION, EMULSION INTRAVENOUS at 05:02

## 2025-02-17 RX ADMIN — PROCHLORPERAZINE EDISYLATE 5 MG: 5 INJECTION INTRAMUSCULAR; INTRAVENOUS at 07:02

## 2025-02-17 RX ADMIN — ACETAMINOPHEN 1000 MG: 10 INJECTION, SOLUTION INTRAVENOUS at 05:02

## 2025-02-17 NOTE — ANESTHESIA PREPROCEDURE EVALUATION
[facility]  Anesthesia Pre-Operative Evaluation         Patient Name: Megha Machado  YOB: 1985  MRN: [id number]    SUBJECTIVE:     Pre-operative evaluation for Procedure(s) (LRB):  ROBOTIC CHOLECYSTECTOMY (N/A)     2025    Megha Machado is a 39 y.o. female w/ a significant PMHx of hypothyroidism, gallstones. Last ate 830 am and last liquid at 1130.  She denies any nausea or vomiting.    Patient now presents for the above procedure(s).    TTE:   No echocardiogram results found for the past 12 months     Problem List[1]    Review of patient's allergies indicates:   Allergen Reactions    Adhesive      tape    Penicillins        Current Inpatient Medications:   levoFLOXacin  750 mg Intravenous Q24H       Medications Ordered Prior to Encounter[2]    Past Surgical History:   Procedure Laterality Date     SECTION  06, 08, 16    D&C      TUBAL LIGATION  2016       OBJECTIVE:     Vital Signs Range (Last 24H):  Temp:  [36.4 °C (97.6 °F)-36.7 °C (98 °F)]   Pulse:  [62-79]   Resp:  [16-22]   BP: (120-165)/(64-79)   SpO2:  [95 %-100 %]       Significant Labs:  Lab Results   Component Value Date    WBC 8.95 2025    HGB 13.8 2025    HCT 41.7 2025     2025    CHOL 223 (H) 2023    TRIG 139 2023    HDL 52 2023    ALT 12 2025    AST 17 2025     2025    K 4.0 2025     2025    CREATININE 0.8 2025    BUN 11 2025    CO2 22 (L) 2025    TSH 1.986 2024    INR 1.0 2020       Diagnostic Studies: No relevant studies.    EKG:   Results for orders placed or performed during the hospital encounter of 24   EKG 12-lead    Collection Time: 24  1:06 PM    Narrative    Test Reason : R07.9,    Vent. Rate : 053 BPM     Atrial Rate : 053 BPM     P-R Int : 168 ms          QRS Dur : 084 ms      QT Int : 384 ms       P-R-T Axes : 072 086 074  degrees     QTc Int : 360 ms    Sinus bradycardia  Otherwise normal ECG  When compared with ECG of 29-DEC-2023 11:27,  No significant change was found  Confirmed by Lino Orosco MD ([id number]) on 1/31/2024 8:59:45 AM    Referred By: LEN   SELF           Confirmed By:Lino Orosco MD       ASSESSMENT/PLAN:       Pre-op Assessment    I have reviewed the Patient Summary Reports.     I have reviewed the Nursing Notes. I have reviewed the NPO Status.   I have reviewed the Medications.     Review of Systems  Anesthesia Hx:  No problems with previous Anesthesia               Denies Personal Hx of Anesthesia complications.                    Social:  Non-Smoker       Hematology/Oncology:       -- Denies Anemia:                  Denies Current/Recent Cancer                Cardiovascular:  Exercise tolerance: good    Denies Hypertension.   Denies MI.  Denies CAD.     Denies Dysrhythmias.    Denies CHF.    no hyperlipidemia   ECG has been reviewed.    Functional Capacity good / => 4 METS                         Pulmonary:    Denies COPD.  Denies Asthma.   Denies Shortness of breath.   Denies Sleep Apnea.                Renal/:   Denies Chronic Renal Disease.                Hepatic/GI:      Denies GERD.                Musculoskeletal:  Musculoskeletal Normal                Neurological:  Denies TIA.  Denies CVA.    Denies Seizures.                                Endocrine:   Hypothyroidism       Hypothyroidism              Physical Exam  General: Well nourished, Cooperative, Alert and Oriented    Airway:  Mallampati: I   Mouth Opening: Normal  TM Distance: 4 - 6 cm  Tongue: Normal  Neck ROM: Normal ROM    Dental:  Intact    Anesthesia Plan  Type of Anesthesia, risks & benefits discussed:    Anesthesia Type: Gen ETT  Intra-op Monitoring Plan: Standard ASA Monitors  Post Op Pain Control Plan: multimodal analgesia and IV/PO Opioids PRN  Induction:  IV and rapid sequence  Airway Plan: Video,  Post-Induction  Informed Consent: Informed consent signed with the Patient and all parties understand the risks and agree with anesthesia plan.  All questions answered.   ASA Score: 2  Day of Surgery Review of History & Physical: H&P Update referred to the surgeon/provider.    Ready For Surgery From Anesthesia Perspective.   .             [1]  Patient Active Problem List  Diagnosis    Hypothyroidism (acquired)    Obesity    Calculus of gallbladder with acute cholecystitis without obstruction   [2]  No current facility-administered medications on file prior to encounter.     Current Outpatient Medications on File Prior to Encounter   Medication Sig Dispense Refill    MULTIVITAMIN ORAL Take 1 tablet by mouth once daily.      FLUoxetine 40 MG capsule Take 1 capsule (40 mg total) by mouth once daily. (Patient not taking: Reported on 2/17/2025) 30 capsule 11

## 2025-02-17 NOTE — ANESTHESIA PROCEDURE NOTES
Intubation    Date/Time: 2/17/2025 5:21 PM    Performed by: Keyur Escobedo Jr., CRNA  Authorized by: Tremaine Oliver DO    Intubation:     Induction:  Rapid sequence induction    Intubated:  Postinduction    Mask Ventilation:  Not attempted    Attempts:  1    Attempted By:  CRNA    Method of Intubation:  Video laryngoscopy    Blade:  Mcgraw 3    Laryngeal View Grade: Grade I - full view of cords      Difficult Airway Encountered?: No      Complications:  None    Airway Device:  Oral endotracheal tube    Airway Device Size:  7.0    Style/Cuff Inflation:  Cuffed (inflated to minimal occlusive pressure)    Tube secured:  20    Secured at:  The lips    Placement Verified By:  Capnometry    Complicating Factors:  None    Findings Post-Intubation:  BS equal bilateral and atraumatic/condition of teeth unchanged

## 2025-02-17 NOTE — FIRST PROVIDER EVALUATION
Emergency Department TeleTriage Encounter Note      CHIEF COMPLAINT    Chief Complaint   Patient presents with    Abdominal Pain     C/o midline ABD pain x2 days w/ back pain and N/V x4 days. Pt states she was seen in ED yesterday but unable to fill prescriptions.        VITAL SIGNS   Initial Vitals [02/17/25 1320]   BP Pulse Resp Temp SpO2   133/66 68 16 98 °F (36.7 °C) 100 %      MAP       --            ALLERGIES    Review of patient's allergies indicates:   Allergen Reactions    Adhesive      tape    Penicillins        PROVIDER TRIAGE NOTE  Verbal consent for the teletriage evaluation was given by the patient at the start of the evaluation.  All efforts will be made to maintain patient's privacy during the evaluation.      This is a teletriage evaluation of a 39 y.o. female presenting to the ED with c/o in the ED last night, diagnosed with chronic cholecystitis and discharged.  Patient reports being unable to fill her RX for pain meds. Limited physical exam via telehealth: The patient is awake, alert, answering questions appropriately and is not in respiratory distress.  As the Teletriage provider, I performed an initial assessment and ordered appropriate labs and imaging studies, if any, to facilitate the patient's care once placed in the ED. Once a room is available, care and a full evaluation will be completed by an alternate ED provider.  Any additional orders and the final disposition will be determined by that provider.  All imaging and labs will not be followed-up by the Teletriage Team, including myself.          ORDERS  Labs Reviewed - No data to display    ED Orders (720h ago, onward)      Start Ordered     Status Ordering Provider    02/17/25 1359 02/17/25 1358  POCT urine pregnancy  Once         Ordered JOCELYN CANO              Virtual Visit Note: The provider triage portion of this emergency department evaluation and documentation was performed via [employer], a HIPAA-compliant telemedicine  application, in concert with a tele-presenter in the room. A face to face patient evaluation with one of my colleagues will occur once the patient is placed in an emergency department room.      DISCLAIMER: This note was prepared with [employer] voice recognition transcription software. Garbled syntax, mangled pronouns, and other bizarre constructions may be attributed to that software system.

## 2025-02-17 NOTE — ED PROVIDER NOTES
Encounter Date: 2025       History     Chief Complaint   Patient presents with    Abdominal Pain     C/o midline ABD pain x2 days w/ back pain and N/V x4 days. Pt states she was seen in ED yesterday but unable to fill prescriptions.      Patient is a 39 y.o. female who presents to the ED for evaluation of epigastric abdominal pain X 2-3 days. Patient was seen in the ED last night and had an ultrasound concerning for acute cholecystitis. Per chart review, patient was evaluated last night by General Surgery who recommended cholecystectomy, however, patient decided to go home because she needed to make  arrangements for her 8-year-old daughter.    Patient says that her epigastric pain today has gotten worse .  States that it is constant in nature.  Also reports nausea. Patient has taken no medication for symptom relief. Says she ate today around 8am, but has not had anything else to eat or drink since this time.     Patient has had 3 C sections, denies any other previous abdominal surgeries.     Patient denies fever, jaundice, diarrhea, constipation, melena, change in bowel habits, dysuria, difficulty urinating, hematuria, flank pain, chest pain, shortness of breath, rashes, or any other complaints at this time.      The history is provided by the patient and medical records.     Review of patient's allergies indicates:   Allergen Reactions    Adhesive      tape    Penicillins      History reviewed. No pertinent past medical history.  Past Surgical History:   Procedure Laterality Date     SECTION  06, 08, 16    D&C      TUBAL LIGATION  2016     Family History   Problem Relation Name Age of Onset    Heart disease Maternal Grandmother      Heart disease Paternal Grandmother       Social History[1]  Review of Systems   Constitutional:  Negative for chills and fever.   Gastrointestinal:  Positive for abdominal pain, nausea and vomiting. Negative for abdominal distention.    Genitourinary: Negative.        Physical Exam     Initial Vitals [02/17/25 1320]   BP Pulse Resp Temp SpO2   133/66 68 16 98 °F (36.7 °C) 100 %      MAP       --         Physical Exam    Constitutional: She appears well-developed and well-nourished.   Cardiovascular:  Normal rate.           Abdominal: Bowel sounds are normal. She exhibits no distension. There is abdominal tenderness in the epigastric area. There is no rebound and no guarding.           ED Course   Procedures  Labs Reviewed   COMPREHENSIVE METABOLIC PANEL - Abnormal       Result Value    Sodium 138      Potassium 4.0      Chloride 106      CO2 22 (*)     Glucose 95      BUN 11      Creatinine 0.8      Calcium 8.7      Total Protein 7.3      Albumin 4.1      Total Bilirubin 0.7      Alkaline Phosphatase 91      AST 17      ALT 12      eGFR >60      Anion Gap 10     CBC W/ AUTO DIFFERENTIAL    WBC 8.95      RBC 4.70      Hemoglobin 13.8      Hematocrit 41.7      MCV 89      MCH 29.4      MCHC 33.1      RDW 12.9      Platelets 257      MPV 10.3      Immature Granulocytes 0.4      Gran # (ANC) 5.7      Immature Grans (Abs) 0.04      Lymph # 2.0      Mono # 0.9      Eos # 0.2      Baso # 0.06      nRBC 0      Gran % 63.6      Lymph % 22.3      Mono % 10.4      Eosinophil % 2.6      Basophil % 0.7      Differential Method Automated     LIPASE    Lipase 28     POCT URINE PREGNANCY    POC Preg Test, Ur Negative       Acceptable Yes            Imaging Results    None          Medications   ondansetron disintegrating tablet 8 mg (has no administration in time range)   melatonin tablet 6 mg (has no administration in time range)   levoFLOXacin 750 mg/150 mL IVPB 750 mg (750 mg Intravenous New Bag 2/17/25 1801)   morphine injection 2 mg (2 mg Intravenous Given 2/17/25 1514)   ondansetron injection 4 mg (4 mg Intravenous Given 2/17/25 1514)     Medical Decision Making  Patient is an afebrile 39 y.o. female. VSS and do not suggest sepsis.  Denies  chest pain, SOB, or any other complaints at this time. A&Ox4. The patient remained comfortable and stable during their visit in the ED. Details of ED course documented in ED workup.     Differential diagnosis includes, but is not limited to: Cholecystitis, cholangitis, cholelithiasis, choledocholithiasis, pancreatitis    After review of the patients physical exam, ED testing, and history/symptoms, the patient will be admitted for further management of acute cholecystitis. General Surgery assessed patient and accepts admission for cholecystectomy.  Admit orders will be completed. The diagnosis, treatment and plan for admission were discussed with the patient and understanding was verbalized. All questions or concerns have been addressed.     Amount and/or Complexity of Data Reviewed  External Data Reviewed: radiology and notes.     Details: Reviewed notes, imaging, and labs from patients ER visit from last night.   Labs: ordered. Decision-making details documented in ED Course.  Radiology: ordered and independent interpretation performed. Decision-making details documented in ED Course.    Risk  Prescription drug management.               ED Course as of 02/17/25 2331   Mon Feb 17, 2025   1447 US from last night: Multiple gallstones are seen as well as sludge in the gallbladder. Gallbladder wall thickened up to 5.5 mm. No pericholecystic fluid.  No sonographic Barbosa's sign.  Sonographic findings equivocal for potential acute cholecystitis.  [OB]   1450 Called General Surgery, currently in a case. Will call me back. Sent secure chat with info [OB]   1526 WBC: 8.95  No leukocytosis [OB]   1527 Hemoglobin: 13.8 [OB]   1527 Hematocrit: 41.7  H/H stable [OB]   1538 BILIRUBIN TOTAL: 0.7  WNL [OB]   1538 ALP: 91  WNL [OB]   1538 AST: 17  WNL [OB]   1538 ALT: 12  WNL [OB]   1539 Lipase: 28  WNL [OB]   1613 General Surgery at bedside. Patient will be admitted for cholecystectomy.  [OB]      ED Course User Index  [OB] Isabel  KAREY Ospina                           Clinical Impression:  Final diagnoses:  [K81.0] Acute cholecystitis          ED Disposition Condition    Observation                     [1]   Social History  Tobacco Use    Smoking status: Never    Smokeless tobacco: Never   Substance Use Topics    Alcohol use: Yes     Alcohol/week: 1.0 standard drink of alcohol     Types: 1 Glasses of wine per week    Drug use: Never        Anai Hall PA-C  02/17/25 0212

## 2025-02-17 NOTE — LETTER
02/17/2025                    [address] [place] [address] [place]O [address]  Phone: [phone]  Fax: [phone]   02/17/2025    Patient: Megha Machado   YOB: 1985   Date of Visit: 2/17/2025       To Whom it May Concern:    Megha Machado was seen on 2/17/2025. She may return to work on 2/24/205 with lifting restrictions of no more than 10 pounds for 6 weeks.    If you have any questions or concerns, please don't hesitate to call.    Sincerely,         Myra Rodríguez RN

## 2025-02-17 NOTE — ED TRIAGE NOTES
Pt reports back pain since Thursday. She was seen in the ED last night and was told that it was her gallbladder. She was told if it worsened then to come back. She states the pain is constant and is worse. Sitting makes the pain worst. She is having N/V but denies diarrhea

## 2025-02-17 NOTE — ASSESSMENT & PLAN NOTE
Mrs. Megha Machado is a 39F without significant PMHx who presents with suspected acute cholecystitis.    - Admit to General Surgery  - Plan for robotic cholecystectomy 2/17. Risks/benefits/alternatives to procedure discussed with the patient  - Diet: NPO  - mIVF: House fluids @ 75/hr  - MMT  - PRN nausea medications    Dispo: Pending OR findings.

## 2025-02-17 NOTE — PHARMACY MED REC
"    [facility]           Pharmacy  Admission Medication History     The home medication history was taken by Nory Murphy.      Medication history obtained from Medications listed below were obtained from: Patient/family.    Based on information gathered for medication list, you may go to "Admission" then "Reconcile Home Medications" tabs to review and/or act upon those items.     The home medication list has been updated by the Pharmacy department.   Please read ALL comments highlighted in yellow.   Please address this information as you see fit.    Feel free to contact us if you have any questions or require assistance.    The medications listed below were removed from the home medication list.  Please reorder if appropriate:    Patient reports NOT TAKING the following medication(s):  Levothyroxine 112 mcg tab  Meloxicam 15 mg tab  Naproxen 500 mg tab      Current Facility-Administered Medications on File Prior to Encounter   Medication Dose Route Frequency Provider Last Rate Last Admin    [COMPLETED] HYDROmorphone injection 1 mg  1 mg Intravenous ED 1 Time Tito Flores MD   1 mg at 02/16/25 2331     Current Outpatient Medications on File Prior to Encounter   Medication Sig Dispense Refill    MULTIVITAMIN ORAL Take 1 tablet by mouth once daily.      FLUoxetine 40 MG capsule Take 1 capsule (40 mg total) by mouth once daily. (Patient not taking: Reported on 2/17/2025) 30 capsule 11       Please address this information as you see fit.  Feel free to contact us if you have any questions or require assistance.    Nory Murphy  [phone]              .          "

## 2025-02-17 NOTE — HPI
Mrs. Megha Machado is a 39F without significant PMHx. She presents to the ED today with concerns for acute cholecystitis.   Her pain initially began 5 days prior to presentation and initially radiated to the back with progression to epigastric pain and decreased PO intake 2/2 fullness. This is her first episode of pain like this, however she was previously aware she had gallstones from a prior CT scan performed in Dec for abdominal pain. Her most recent meal was 8am this morning. She had a low grade fevers at work and appeared ill to her coworkers, along with her persistent pain, which prompted her presentation. She denies nausea, vomiting, and chills.     Vitals in ED are overall normal, she is afebrile on presentation. She does not have a white count nor Tbili elevation.   Notably she presented the evening prior however had to leave to care for her kids. RUQUS 2/16 demonstrated multiple gallstones, sludge, and gallbladder wall thickening. Overall her picture is concerning for acute cholecystitis, for which General Surgery is consulted.     Denies prior hx of heart attack or stroke.  Previous Shx 3x CSection  Not taking blood thinners.  No EtOH, tobacco, or illicit drug use

## 2025-02-17 NOTE — SUBJECTIVE & OBJECTIVE
Current Facility-Administered Medications on File Prior to Encounter   Medication    [COMPLETED] HYDROmorphone injection 1 mg     Current Outpatient Medications on File Prior to Encounter   Medication Sig    MULTIVITAMIN ORAL Take 1 tablet by mouth once daily.    FLUoxetine 40 MG capsule Take 1 capsule (40 mg total) by mouth once daily. (Patient not taking: Reported on 2025)    [DISCONTINUED] levothyroxine (SYNTHROID) 112 MCG tablet Take 1 tablet (112 mcg total) by mouth before breakfast. (Patient not taking: Reported on 2025)    [DISCONTINUED] meloxicam (MOBIC) 15 MG tablet TAKE 1 TABLET(15 MG) BY MOUTH EVERY DAY    [DISCONTINUED] naproxen (NAPROSYN) 500 MG tablet Take 1 tablet (500 mg total) by mouth 2 (two) times daily. for 5 days    [DISCONTINUED] ondansetron (ZOFRAN-ODT) 4 MG TbDL Take 1 tablet (4 mg total) by mouth every 6 (six) hours as needed (Nausea).    [DISCONTINUED] ondansetron (ZOFRAN-ODT) 4 MG TbDL Take 2 tablets (8 mg total) by mouth every 8 (eight) hours as needed.    [DISCONTINUED] oxyCODONE (ROXICODONE) 5 MG immediate release tablet Take 1 tablet (5 mg total) by mouth every 8 (eight) hours as needed.    [DISCONTINUED] valACYclovir (VALTREX) 1000 MG tablet Take 1 tablet (1,000 mg total) by mouth 2 (two) times daily. for 10 days       Review of patient's allergies indicates:   Allergen Reactions    Adhesive      tape    Penicillins        History reviewed. No pertinent past medical history.  Past Surgical History:   Procedure Laterality Date     SECTION  06, 08, 16    D&C      TUBAL LIGATION  2016     Family History       Problem Relation (Age of Onset)    Heart disease Maternal Grandmother, Paternal Grandmother          Tobacco Use    Smoking status: Never    Smokeless tobacco: Never   Substance and Sexual Activity    Alcohol use: Yes     Alcohol/week: 1.0 standard drink of alcohol     Types: 1 Glasses of wine per week    Drug use: Never    Sexual activity: Yes      Partners: Male     Birth control/protection: OCP, None     Review of Systems   Constitutional:  Negative for activity change and appetite change.   Respiratory:  Negative for cough and shortness of breath.    Cardiovascular:  Negative for chest pain.   Gastrointestinal:  Positive for abdominal pain. Negative for abdominal distention, diarrhea, nausea and vomiting.   Skin:  Negative for color change and wound.     Objective:     Vital Signs (Most Recent):  Temp: 98 °F (36.7 °C) (02/17/25 1320)  Pulse: 79 (02/17/25 1634)  Resp: 20 (02/17/25 1652)  BP: (!) 147/68 (02/17/25 1652)  SpO2: 95 % (02/17/25 1634) Vital Signs (24h Range):  Temp:  [97.6 °F (36.4 °C)-98 °F (36.7 °C)] 98 °F (36.7 °C)  Pulse:  [62-79] 79  Resp:  [16-22] 20  SpO2:  [95 %-100 %] 95 %  BP: (120-165)/(64-79) 147/68     Weight: 59 kg (130 lb)  Body mass index is 24.56 kg/m².     Physical Exam  Constitutional:       Appearance: Normal appearance.   Cardiovascular:      Rate and Rhythm: Normal rate.   Pulmonary:      Effort: Pulmonary effort is normal. No respiratory distress.   Abdominal:      General: Abdomen is flat. There is no distension.      Palpations: Abdomen is soft.      Tenderness: There is abdominal tenderness. There is guarding (involuntary guarding to RUQ).      Hernia: No hernia is present.   Musculoskeletal:      Right lower leg: No edema.      Left lower leg: No edema.   Skin:     General: Skin is warm and dry.   Neurological:      General: No focal deficit present.      Mental Status: She is alert and oriented to person, place, and time.            I have reviewed all pertinent lab results within the past 24 hours.    Significant Diagnostics:  I have reviewed all pertinent imaging results/findings within the past 24 hours.

## 2025-02-17 NOTE — H&P
Please see consult note filed 02/17/2025 for full H&P.    Janet Eduardo MD   General Surgery PGY-2  2/17/2025

## 2025-02-17 NOTE — ED PROVIDER NOTES
Encounter Date: 2025       History     Chief Complaint   Patient presents with    Back Pain     Pt presents with lower back pain that's been on going for 2 days. Pt states the pain radiates to stomach     HPI  39-year-old female no pertinent medical history presents brought in by self through triage for epigastric and right upper quadrant abdominal pain radiating to the back, comes and goes for the last several hours.  Denies any other systemic or infectious symptoms or other acute complaints.  Review of patient's allergies indicates:   Allergen Reactions    Adhesive      tape    Penicillins      History reviewed. No pertinent past medical history.  Past Surgical History:   Procedure Laterality Date     SECTION  06, 08, 16    D&C      TUBAL LIGATION  2016     Family History   Problem Relation Name Age of Onset    Heart disease Maternal Grandmother      Heart disease Paternal Grandmother       Social History[1]  Review of Systems  Complete review of systems was conducted and was negative except as per HPI and as marked  Physical Exam     Initial Vitals [25 2248]   BP Pulse Resp Temp SpO2   (!) 165/79 62 18 97.6 °F (36.4 °C) 99 %      MAP       --         Physical Exam  Physical:  General: Awake, alert, no acute distress  Head: Normocephalic, atraumatic  Neck: Trachea midline, full range of motion, no meningismus  ENT: Atraumatic, Airway Patent  Cardio: Regular Rate, Regular Rhythm, skin perfusion normal  Chest: Atraumatic, No respiratory distress no use of accessory muscles to breath, normal rate  Abdomen is soft.  Pronounced epigastric and right upper quadrant tenderness with positive Barbosa's sign  Upper Ext: Atraumatic, Inspection normal, no swelling  Lower Ext: Atraumatic, Inspection normal, no swelling  Neuro: No gross cranial nerve abnormality, no lateralization, no gross sensory or motor deficits  ED Course   Procedures  Labs Reviewed   COMPREHENSIVE METABOLIC PANEL -  Abnormal       Result Value    Sodium 137      Potassium 3.7      Chloride 106      CO2 23      Glucose 137 (*)     BUN 14      Creatinine 1.0      Calcium 8.6 (*)     Total Protein 6.9      Albumin 3.9      Total Bilirubin 0.4      Alkaline Phosphatase 84      AST 17      ALT 12      eGFR >60      Anion Gap 8     URINALYSIS, REFLEX TO URINE CULTURE - Abnormal    Specimen UA Urine, Clean Catch      Color, UA Yellow      Appearance, UA Hazy (*)     pH, UA 6.0      Specific Gravity, UA 1.025      Protein, UA Trace (*)     Glucose, UA Negative      Ketones, UA Negative      Bilirubin (UA) Negative      Occult Blood UA Trace (*)     Nitrite, UA Negative      Urobilinogen, UA Negative      Leukocytes, UA Negative      Narrative:     Specimen Source->Urine   CBC W/ AUTO DIFFERENTIAL    WBC 9.54      RBC 4.56      Hemoglobin 13.4      Hematocrit 40.4      MCV 89      MCH 29.4      MCHC 33.2      RDW 12.7      Platelets 245      MPV 10.4      Immature Granulocytes 0.4      Gran # (ANC) 5.8      Immature Grans (Abs) 0.04      Lymph # 2.2      Mono # 1.0      Eos # 0.3      Baso # 0.10      nRBC 0      Gran % 61.2      Lymph % 23.4      Mono % 10.4      Eosinophil % 3.6      Basophil % 1.0      Differential Method Automated     C-REACTIVE PROTEIN    CRP 5.9     LIPASE    Lipase 41     POCT URINE PREGNANCY    POC Preg Test, Ur Negative       Acceptable Yes            Imaging Results               US Abdomen Limited (Final result)  Result time 02/17/25 00:54:18      Final result by Hammad Romero MD (02/17/25 00:54:18)                   Impression:      Multiple gallstones are seen as well as sludge in the gallbladder. Gallbladder wall thickened up to 5.5 mm. No pericholecystic fluid.  No sonographic Barbosa's sign.  Sonographic findings equivocal for potential acute cholecystitis.  Correlate clinically.  Consider further evaluation with HIDA scan to assess cystic duct patency if acute cholecystitis is  suspected.    This report was flagged in Epic as abnormal.      Electronically signed by: Hammad Romero MD  Date:    02/17/2025  Time:    00:54               Narrative:    EXAMINATION:  US ABDOMEN LIMITED    CLINICAL HISTORY:  RUQ pain;    TECHNIQUE:  Limited ultrasound of the right upper quadrant of the abdomen focused on the biliary system was performed.    COMPARISON:  None    FINDINGS:  Gallbladder: Multiple gallstones are seen as well as sludge in the gallbladder.  Gallbladder wall thickened up to 5.5 mm.  No pericholecystic fluid.  No sonographic Barbosa's sign.    Biliary system: The common duct is not dilated, measuring 5.7 mm.  No intrahepatic ductal dilatation.    Pancreas: The visualized portions of pancreas appear normal.    Miscellaneous: No upper abdominal ascites and no abnormalities of the visualized liver.                                       Medications   HYDROmorphone injection 1 mg (1 mg Intravenous Given 2/16/25 2441)     Medical Decision Making  Amount and/or Complexity of Data Reviewed  Labs: ordered.  Radiology: ordered.    Risk  Prescription drug management.    Will evaluate for acute pathology requiring intervention with appropriate studies, treat symptomatically, and reassess.  High suspicion for biliary pathology such as cholecystitis, biliary colic, choledocholithiasis, etcetera.  DX includes other benign intra etiologies, GERD, multifocal musculoskeletal pains.            Studies reviewed, and consistent with biliary colic, possibly chronic cholecystitis but no findings of acute cholecystitis.  On reassessment her pain has entirely resolved in his pain-free.  I discussed this with the on-call general surgery team, all elements of care discussed, we agreed that patient could either be admitted if they wanted for expedited care or discharge with timely outpatient follow up understanding that patient will require cholecystectomy in the near future.  Patient adamantly wishes to go home  today, citing that she has over the course of the coming days at crucial stages of her divorce and that hospitalizing her on this particular day would be disruptive and dangerous, but she specifically verbalized the understanding of need for timely outpatient follow up.  All studies reviewed, negative for acute pathology requiring intervention.     Diagnosis, use of prescription medications, need for follow-up regarding visit today, need to call for follow-up, expected course, and return precautions were all discussed at length in my traditional manner to which patient verbalized understanding and agrees and all questions were answered. Patient is well appearing and ambulatory at time of discharge. Patient seen in the Emergency department, which included consideration and evaluation for life and limb threatening medical conditions including the history, exam, timely review and interpretation of studies.       Clinical Impression:  Final diagnoses:  [K81.1] Chronic cholecystitis (Primary)  [K80.50] Biliary colic          ED Disposition Condition    Discharge           ED Prescriptions       Medication Sig Dispense Start Date End Date Auth. Provider    naproxen (NAPROSYN) 500 MG tablet Take 1 tablet (500 mg total) by mouth 2 (two) times daily. for 5 days 10 tablet 2/17/2025 2/22/2025 Tito Flores MD    oxyCODONE (ROXICODONE) 5 MG immediate release tablet Take 1 tablet (5 mg total) by mouth every 8 (eight) hours as needed. 9 tablet 2/17/2025 2/20/2025 Tito Flores MD    ondansetron (ZOFRAN-ODT) 4 MG TbDL Take 2 tablets (8 mg total) by mouth every 8 (eight) hours as needed. 12 tablet 2/17/2025 2/20/2025 Tito Flores MD          Follow-up Information       Follow up With Specialties Details Why Contact Info    Jacob Ta MD Surgery, General Surgery   [address]  [address]  [facility] [address] [phone]                   [1]   Social History  Tobacco Use    Smoking status: Never    Smokeless  tobacco: Never   Substance Use Topics    Alcohol use: Yes     Alcohol/week: 1.0 standard drink of alcohol     Types: 1 Glasses of wine per week    Drug use: Never        Tito Flores MD  02/17/25 0243

## 2025-02-17 NOTE — DISCHARGE INSTRUCTIONS
Follow-up with general surgery team by calling for appointment, to discuss routine outpatient removal of your gallbladder.  If you are having severe pain or severe vomiting despite taking the prescribed medications, or if you develop a fever, or any yellowing of the skin, return to the emergency department immediately

## 2025-02-17 NOTE — CONSULTS
[facility] - Emergency Dept  General Surgery  Consult Note    Patient Name: Megha Machado  MRN: [id number]  Code Status: Full Code  Admission Date: 2/17/2025  Hospital Length of Stay: 0 days  Attending Physician: Shamar Pérez MD  Primary Care Provider: Julio Parker MD    Patient information was obtained from patient, past medical records, and ER records.     Inpatient consult to General Surgery  Consult performed by: Janet Eduardo MD  Consult ordered by: Janet Eduardo MD        Subjective:     Principal Problem: <principal problem not specified>    History of Present Illness: Mrs. Megha Machado is a 39F without significant PMHx. She presents to the ED today with concerns for acute cholecystitis.   Her pain initially began 5 days prior to presentation and initially radiated to the back with progression to epigastric pain and decreased PO intake 2/2 fullness. This is her first episode of pain like this, however she was previously aware she had gallstones from a prior CT scan performed in Dec for abdominal pain. Her most recent meal was 8am this morning. She had a low grade fevers at work and appeared ill to her coworkers, along with her persistent pain, which prompted her presentation. She denies nausea, vomiting, and chills.     Vitals in ED are overall normal, she is afebrile on presentation. She does not have a white count nor Tbili elevation.   Notably she presented the evening prior however had to leave to care for her kids. RUQUS 2/16 demonstrated multiple gallstones, sludge, and gallbladder wall thickening. Overall her picture is concerning for acute cholecystitis, for which General Surgery is consulted.     Denies prior hx of heart attack or stroke.  Previous Shx 3x CSection  Not taking blood thinners.  No EtOH, tobacco, or illicit drug use     Current Facility-Administered Medications on File Prior to Encounter   Medication    [COMPLETED] HYDROmorphone injection 1 mg     Current Outpatient  Medications on File Prior to Encounter   Medication Sig    MULTIVITAMIN ORAL Take 1 tablet by mouth once daily.    FLUoxetine 40 MG capsule Take 1 capsule (40 mg total) by mouth once daily. (Patient not taking: Reported on 2025)    [DISCONTINUED] levothyroxine (SYNTHROID) 112 MCG tablet Take 1 tablet (112 mcg total) by mouth before breakfast. (Patient not taking: Reported on 2025)    [DISCONTINUED] meloxicam (MOBIC) 15 MG tablet TAKE 1 TABLET(15 MG) BY MOUTH EVERY DAY    [DISCONTINUED] naproxen (NAPROSYN) 500 MG tablet Take 1 tablet (500 mg total) by mouth 2 (two) times daily. for 5 days    [DISCONTINUED] ondansetron (ZOFRAN-ODT) 4 MG TbDL Take 1 tablet (4 mg total) by mouth every 6 (six) hours as needed (Nausea).    [DISCONTINUED] ondansetron (ZOFRAN-ODT) 4 MG TbDL Take 2 tablets (8 mg total) by mouth every 8 (eight) hours as needed.    [DISCONTINUED] oxyCODONE (ROXICODONE) 5 MG immediate release tablet Take 1 tablet (5 mg total) by mouth every 8 (eight) hours as needed.    [DISCONTINUED] valACYclovir (VALTREX) 1000 MG tablet Take 1 tablet (1,000 mg total) by mouth 2 (two) times daily. for 10 days       Review of patient's allergies indicates:   Allergen Reactions    Adhesive      tape    Penicillins        History reviewed. No pertinent past medical history.  Past Surgical History:   Procedure Laterality Date     SECTION  06, 08, 16    D&C      TUBAL LIGATION  2016     Family History       Problem Relation (Age of Onset)    Heart disease Maternal Grandmother, Paternal Grandmother          Tobacco Use    Smoking status: Never    Smokeless tobacco: Never   Substance and Sexual Activity    Alcohol use: Yes     Alcohol/week: 1.0 standard drink of alcohol     Types: 1 Glasses of wine per week    Drug use: Never    Sexual activity: Yes     Partners: Male     Birth control/protection: OCP, None     Review of Systems   Constitutional:  Negative for activity change and appetite  change.   Respiratory:  Negative for cough and shortness of breath.    Cardiovascular:  Negative for chest pain.   Gastrointestinal:  Positive for abdominal pain. Negative for abdominal distention, diarrhea, nausea and vomiting.   Skin:  Negative for color change and wound.     Objective:     Vital Signs (Most Recent):  Temp: 98 °F (36.7 °C) (02/17/25 1320)  Pulse: 79 (02/17/25 1634)  Resp: 20 (02/17/25 1652)  BP: (!) 147/68 (02/17/25 1652)  SpO2: 95 % (02/17/25 1634) Vital Signs (24h Range):  Temp:  [97.6 °F (36.4 °C)-98 °F (36.7 °C)] 98 °F (36.7 °C)  Pulse:  [62-79] 79  Resp:  [16-22] 20  SpO2:  [95 %-100 %] 95 %  BP: (120-165)/(64-79) 147/68     Weight: 59 kg (130 lb)  Body mass index is 24.56 kg/m².     Physical Exam  Constitutional:       Appearance: Normal appearance.   Cardiovascular:      Rate and Rhythm: Normal rate.   Pulmonary:      Effort: Pulmonary effort is normal. No respiratory distress.   Abdominal:      General: Abdomen is flat. There is no distension.      Palpations: Abdomen is soft.      Tenderness: There is abdominal tenderness. There is guarding (involuntary guarding to RUQ).      Hernia: No hernia is present.   Musculoskeletal:      Right lower leg: No edema.      Left lower leg: No edema.   Skin:     General: Skin is warm and dry.   Neurological:      General: No focal deficit present.      Mental Status: She is alert and oriented to person, place, and time.            I have reviewed all pertinent lab results within the past 24 hours.    Significant Diagnostics:  I have reviewed all pertinent imaging results/findings within the past 24 hours.    Assessment/Plan:     Calculus of gallbladder with acute cholecystitis without obstruction  Mrs. Megha Machado is a 39F without significant PMHx who presents with suspected acute cholecystitis.    - Admit to General Surgery  - Plan for robotic cholecystectomy 2/17. Risks/benefits/alternatives to procedure discussed with the patient  - Diet: NPO  -  mIVF: House fluids @ 75/hr  - MMT  - PRN nausea medications    Dispo: Pending OR findings.        VTE Risk Mitigation (From admission, onward)           Ordered     IP VTE HIGH RISK PATIENT  Once         02/17/25 1620     Place sequential compression device  Until discontinued         02/17/25 1620                    Thank you for your consult.    Janet Eduardo MD  General Surgery  [facility] - Emergency Dept

## 2025-02-18 NOTE — TRANSFER OF CARE
"Anesthesia Transfer of Care Note    Patient: Megha Machado    Procedure(s) Performed: Procedure(s) (LRB):  ROBOTIC CHOLECYSTECTOMY (N/A)    Patient location: [facility] Surgical Floor    Anesthesia Type: general    Transport from OR: Transported from OR on 6-10 L/min O2 by face mask with adequate spontaneous ventilation    Post pain: adequate analgesia    Post assessment: no apparent anesthetic complications and tolerated procedure well    Post vital signs: stable    Level of consciousness: awake    Nausea/Vomiting: no nausea/vomiting    Complications: none    Transfer of care protocol was followed      Last vitals: Visit Vitals  BP (!) 147/68   Pulse 79   Temp 36.7 °C (98 °F) (Oral)   Resp 20   Ht 5' 1" (1.549 m)   Wt 59 kg (130 lb)   LMP 02/02/2025 (Exact Date)   SpO2 95%   Breastfeeding No   BMI 24.56 kg/m²     "

## 2025-02-18 NOTE — NURSING
Reviewed discharge instructions and medications with the patient.  Allowed for questions.  IV removed per bedside nurse.  Informed the bedside nurse Reyna that she is ready for discharge.

## 2025-02-18 NOTE — ANESTHESIA POSTPROCEDURE EVALUATION
Anesthesia Post Evaluation    Patient: Megha Machado    Procedure(s) Performed: Procedure(s) (LRB):  ROBOTIC CHOLECYSTECTOMY (N/A)    Final Anesthesia Type: general      Patient location during evaluation: PACU  Patient participation: Yes- Able to Participate  Level of consciousness: awake and alert  Post-procedure vital signs: reviewed and stable  Pain management: adequate  Airway patency: patent    PONV status at discharge: No PONV  Anesthetic complications: no      Cardiovascular status: stable  Respiratory status: room air  Hydration status: euvolemic  Follow-up not needed.              Vitals Value Taken Time   /57 02/17/25 20:30   Temp 36.8 °C (98.3 °F) 02/17/25 20:30   Pulse 80 02/17/25 20:31   Resp 18 02/17/25 20:30   SpO2 92 % 02/17/25 20:31   Vitals shown include unfiled device data.      Event Time   Out of Recovery 20:35:00         Pain/Isela Score: Presence of Pain: denies (2/17/2025  1:36 AM)  Pain Rating Prior to Med Admin: 6 (2/17/2025  7:48 PM)  Pain Rating Post Med Admin: 2 (2/17/2025  8:30 PM)  Isela Score: 10 (2/17/2025  8:30 PM)

## 2025-02-18 NOTE — DISCHARGE INSTRUCTIONS
SEE PRESCRIPTION INSERTS FOR INFORMATION ON YOUR MEDICATIONS     ANESTHESIA  -For the first 24 hours after surgery:  Do not drive, use heavy equipment, make important decisions, or drink alcohol  -It is normal to feel sleepy for several hours.  Rest until you are more awake.  -Have someone stay with you, if needed.  They can watch for problems and help keep you safe.  -Some possible post anesthesia side effects include: nausea and vomiting, sore throat and hoarseness, sleepiness, and dizziness.    PAIN  -If you have pain after surgery, pain medicine will help you feel better.  Take it as directed, before pain becomes severe.  Most pain relievers taken by mouth need at least 20-30 minutes to start working.  -Do not drive or drink alcohol while taking pain medicine.  -Pain medication can upset your stomach.  Taking them with a little food may help.  -Other ways to help control pain: elevation, ice, and relaxation  -Call your surgeon if still having unmanageable pain an hour after taking pain medicine.  -Pain medicine can cause constipation.  Taking an over-the counter stool softener while on prescription pain medicine and drinking plenty of fluids can prevent this side effect.  -Call your surgeon if you have severe side effects like: breathing problems, trouble waking up, dizziness, confusion, or severe constipation.    NAUSEA  -Some people have nausea after surgery.  This is often because of anesthesia, pain, pain medicine, or the stress of surgery.  -Do not push yourself to eat.  Start off with clear liquids and soup.  Slowly move to solid foods.  Don't eat fatty, rich, spicy foods at first.  Eat smaller amounts.  -If you develop persistent nausea and vomiting please notify your surgeon immediately.    BLEEDING  -Different types of surgery require different types of care and dressing changes.  It is important to follow all instructions and advice from your surgeon.  Change dressing as directed.  Call your surgeon  for any concerns regarding postop bleeding.    SIGNS OF INFECTION  -Signs of infection include: fever, swelling, drainage, and redness  -Notify your surgeon if you have a fever of 100.4 F (38.0 C) or higher.  -Notify your surgeon if you notice redness, swelling, increased pain, pus, or a foul smell at the incision site.       LIFTING Restrictions:   No lifting greater than 10 pounds for 6 weeks from day of surgery.   No pushing/pulling such as vacuuming or raking.   No straining, avoid constipation and take stool softeners as described and laxatives as needed.   No driving while on narcotics and until you can react quickly without pain.

## 2025-02-18 NOTE — BRIEF OP NOTE
[facility] - Surgery ([facility])  Brief Operative Note    Surgery Date: 2/17/2025     Surgeons and Role:     * Valentín Blake MD - Primary    Assisting Surgeon: None    Pre-op Diagnosis:  Cholecystitis [K81.9]    Post-op Diagnosis:  Post-Op Diagnosis Codes:     * Cholecystitis [K81.9]    Procedure(s) (LRB):  ROBOTIC CHOLECYSTECTOMY (N/A)    Anesthesia: General    Operative Findings: Acute inflammation of the gallbladder with calculi. Critical view obtained    Estimated Blood Loss: * No values recorded between 2/17/2025  5:33 PM and 2/17/2025  7:34 PM *         Specimens:   Specimen (24h ago, onward)       Start     Ordered    02/17/25 1855  Specimen to Pathology, Surgery General Surgery  Once        Comments: Pre-op Diagnosis: Cholecystitis [K81.9]Procedure(s):ROBOTIC CHOLECYSTECTOMY Number of specimens: 1Name of specimens: 1. Gallbladder-perm     References:    Click here for ordering Quick Tip   Question Answer Comment   Procedure Type: General Surgery    Release to patient Immediate        02/17/25 1855                      Discharge Note    OUTCOME: Patient tolerated treatment/procedure well without complication and is now ready for discharge.    DISPOSITION: Home or Self Care    FINAL DIAGNOSIS:  Calculus of gallbladder with acute cholecystitis without obstruction    FOLLOWUP: In clinic    DISCHARGE INSTRUCTIONS:    Discharge Procedure Orders   Lifting restrictions   Order Comments: No lifting greater than 10 pounds for 6 weeks from day of surgery.  No pushing/pulling such as vacuuming or raking.  No straining, avoid constipation and take stool softeners as described and laxatives as needed.  No driving while on narcotics and until you can react quickly without pain.     No driving until:   Order Comments: No driving until you are finished taking narcotic pain medications     No dressing needed   Order Comments: Ok to shower tomorrow. Do not submerge incision in bath or pool for 2 weeks.     Notify your health care  provider if you experience any of the following:  temperature >100.4     Notify your health care provider if you experience any of the following:  persistent nausea and vomiting or diarrhea     Notify your health care provider if you experience any of the following:  severe uncontrolled pain     Notify your health care provider if you experience any of the following:  redness, tenderness, or signs of infection (pain, swelling, redness, odor or green/yellow discharge around incision site)     Notify your health care provider if you experience any of the following:  difficulty breathing or increased cough     Notify your health care provider if you experience any of the following:  severe persistent headache     Notify your health care provider if you experience any of the following:  worsening rash     Notify your health care provider if you experience any of the following:  persistent dizziness, light-headedness, or visual disturbances     Notify your health care provider if you experience any of the following:  increased confusion or weakness     Activity as tolerated

## 2025-02-18 NOTE — NURSING
"Pt discharged to home d/c instructions and reviewed with pt by VN, pt verbalized understanding of d/c instructions iv removed vss and no signs of distress noted. Pt was taking out through ER entrance once in the car pt has x1 vomiting episode. I advised pt that it would best if she came back in the hospital with me at that time pt refused and stated she was going home and she didn't wasn't to saty overnight due to her 7 y/o son being at home. Pt stated "I'm fine and if I start feeling bad I will come back through the ER for an evaluation". Her significant other also agreed that they will come back if pt needs to.   "

## 2025-02-19 ENCOUNTER — PATIENT OUTREACH (OUTPATIENT)
Dept: ADMINISTRATIVE | Facility: CLINIC | Age: 40
End: 2025-02-19
Payer: COMMERCIAL

## 2025-02-19 NOTE — PROGRESS NOTES
C3 nurse spoke with Megha Machado  for a TCC post hospital discharge follow up call. The patient does not have a scheduled HOSFU appointment with Julio Parker MD  within 5-7 days post hospital discharge date 02/17/2025. C3 nurse was unable to schedule HOSFU appointment in [facility].  Please contact patient and schedule follow up appointment using HOSFU visit type on or before 02/24/2025.      Message sent to PCP staff. Patient declined home NP.

## 2025-02-19 NOTE — OP NOTE
Op Note    Name: Megha Machado  MRN: [id number]  Date: 2/17/25      Preoperative diagnosis:  acute cholecystitis    Postoperative diagnosis: Same    Anesthesia:  General    Assistants: AILEEN Eduardo MD    Findings:  inflamed gallbladder, critical view of safety achieved    Estimated Blood Loss:  10 cc     Specimens:  Specimens (From admission, onward)       Start     Ordered    02/17/25 1855  Specimen to Pathology, Surgery General Surgery  Once        Comments: Pre-op Diagnosis: Cholecystitis [K81.9]Procedure(s):ROBOTIC CHOLECYSTECTOMY Number of specimens: 1Name of specimens: 1. Gallbladder-perm     References:    Click here for ordering Quick Tip   Question Answer Comment   Procedure Type: General Surgery    Release to patient Immediate        02/17/25 1855                      Procedures:     Robot assisted laparoscopic cholecystectomy     Procedure Description:     I met the patient in the preoperative area and identified the patient by 2 identifiers.  All of the patient's preoperative paperwork was found to be in order.  ICG had been given preoperatively.  The patient was taken the operating room placed in supine position on the operating table.  General anesthesia was smoothly induced and endotracheal tube placed.  The patient's skin was prepped with chlorhexidine solution and draped in a sterile manner.  The team then observed preprocedural pause, completing the entire presurgical checklist.  Everyone agreed to proceed with the case.     I began with optical entry into the left hemiabdomen near Rosario's point with a 5 mm Optiview trocar.  I obtained abdominal access without difficulty and then pneumoperitoneum.  I placed a 8 mm robotic trocar across the abdomen. I drained the gallbladder with a laparoscopic suction needle device.  We then docked the robot in the usual way.  I incised the peritoneum over the gallbladder and follow this incision circumferentially, freeing the gallbladder from the cystic plate.  I  cleared the fibrofatty tissue from the hepatocystic triangle.  I identified only 2 structures entering the gallbladder. Greater than 1/3 of the cystic plate was cleared. Having achieved the critical view of safety, I took the cystic artery with cautery and the cystic duct with Hem-o-kelley clips, with 2 clips on the patient's side and 1 clip on the specimen side.  The duct was then divided with scissors and the rest of the gallbladder taken off the cystic plate with cautery.  The gallbladder was then placed into an Endo Catch bag and removed from the patient.  I inspected the liver bed and it appeared hemostatic.  I closed the extraction site with a 0 Vicryl suture and a Noel Quinn device.  The skin was reapproximated with subcuticular 4-0 Monocryl and covered with Dermabond.  The patient was awakened from anesthesia and taken to the postanesthesia care unit in good condition.       Complications:  None           Valentín Blake MD  Phone Number: [phone]

## 2025-02-20 ENCOUNTER — NURSE TRIAGE (OUTPATIENT)
Dept: ADMINISTRATIVE | Facility: CLINIC | Age: 40
End: 2025-02-20
Payer: COMMERCIAL

## 2025-02-20 ENCOUNTER — PATIENT MESSAGE (OUTPATIENT)
Dept: SURGERY | Facility: CLINIC | Age: 40
End: 2025-02-20
Payer: COMMERCIAL

## 2025-02-20 LAB
FINAL PATHOLOGIC DIAGNOSIS: NORMAL
GROSS: NORMAL
Lab: NORMAL

## 2025-02-20 NOTE — TELEPHONE ENCOUNTER
Pt calling with c/o rash to her stomach and breaking out with little bumps. Pt said that she has taken 2 showers since Monday and incisions look fine and still has steri strips in place. Pt said that rash appeared today and that she isnt sure what is going on. Pt triaged and care advice to discuss with mD and get call back and I will route urgent message to office staff. Pt encouraged to take benadryl but not to put any cream on stomach till directives give per surgeon . Pt to call back if any worsening if SOB to go to nearest ED. Pt will try to upload picture.                Reason for Disposition   Caller has URGENT question and triager unable to answer question    Additional Information   Negative: Sounds like a life-threatening emergency to the triager   Negative: Bright red, wide-spread, sunburn-like rash   Negative: SEVERE headache (e.g., excruciating) and after spinal (epidural) anesthesia   Negative: Vomiting and persists > 4 hours   Negative: Vomiting and abdomen looks much more swollen than usual   Negative: Drinking very little and dehydration suspected (e.g., no urine > 12 hours, very dry mouth, very lightheaded)   Negative: Patient sounds very sick or weak to the triager   Negative: Sounds like a serious complication to the triager   Negative: Fever > 100.4 F (38.0 C)    Protocols used: Post-Op Symptoms and [id number]

## 2025-02-22 ENCOUNTER — NURSE TRIAGE (OUTPATIENT)
Dept: ADMINISTRATIVE | Facility: CLINIC | Age: 40
End: 2025-02-22
Payer: COMMERCIAL

## 2025-02-23 NOTE — TELEPHONE ENCOUNTER
[place]    PCP:  Julio Parker MD    S/P Robotic Cholecystectomy with Valentín Blake MD on 2/17/25.  C/O 2nd incision from the Lt side closest to the belly button with pus drainage (which dried hard on her shirt), rash to abdomen/arm where adhesives were, and redness around the incision.  Denies fever, uncontrolled pain, bleeding, dizziness, shaking chills, weakness, SOB, and CP.  Per protocol, care advised is see HCP (or Surgeon triage) within 4 hrs.  NT spoke with OCP, Dr. Eduardo.  OCP recommends:  they can arrange for her to come in to be seen earlier in the office to evaluate the incision.  Pt VU.  Advised to call for worsening/questions/concerns.  VU.    Reason for Disposition   [1] Pus or bad-smelling fluid draining from incision AND [2] no fever    Additional Information   Negative: [1] Major abdominal surgical incision AND [2] wound gaping open AND [3] visible internal organs   Negative: Sounds like a life-threatening emergency to the triager   Negative: [1] Bleeding from incision AND [2] won't stop after 10 minutes of direct pressure   Negative: [1] Bleeding (more than a few drops) from incision AND [2] tracheostomy or blood vessel surgery (e.g., carotid endarterectomy, femoral bypass graft, kidney dialysis fistula)   Negative: [1] Widespread rash AND [2] bright red, sunburn-like   Negative: Severe pain in the incision   Negative: [1] Incision gaping open AND [2] < 48 hours since wound re-opened   Negative: [1] Incision gaping open AND [2] length of opening > 2 inches (5 cm)   Negative: Patient sounds very sick or weak to the triager   Negative: Sounds like a serious complication to the triager   Negative: Fever > 100.4 F (38.0 C)   Negative: [1] Incision looks infected (e.g., spreading redness, pus) AND [2] fever > 99.5 F (37.5 C)   Negative: [1] Incision looks infected (e.g., spreading redness, pus) AND [2] large red area (> 2 in. or 5 cm)   Negative: [1] Incision looks infected (e.g., spreading redness,  pus) AND [2] face wound   Negative: [1] Red streak runs from the incision AND [2] longer than 1 inch (2.5 cm)    Protocols used: Post-Op Incision Symptoms and [id number]

## 2025-02-24 ENCOUNTER — TELEPHONE (OUTPATIENT)
Dept: SURGERY | Facility: CLINIC | Age: 40
End: 2025-02-24
Payer: COMMERCIAL

## 2025-02-24 ENCOUNTER — OFFICE VISIT (OUTPATIENT)
Dept: SURGERY | Facility: CLINIC | Age: 40
End: 2025-02-24
Payer: COMMERCIAL

## 2025-02-24 VITALS
DIASTOLIC BLOOD PRESSURE: 75 MMHG | HEART RATE: 60 BPM | TEMPERATURE: 97 F | SYSTOLIC BLOOD PRESSURE: 128 MMHG | HEIGHT: 61 IN | BODY MASS INDEX: 26.27 KG/M2 | WEIGHT: 139.13 LBS

## 2025-02-24 DIAGNOSIS — K80.00 CALCULUS OF GALLBLADDER WITH ACUTE CHOLECYSTITIS WITHOUT OBSTRUCTION: Primary | ICD-10-CM

## 2025-02-24 PROCEDURE — 3078F DIAST BP <80 MM HG: CPT | Mod: CPTII,S$GLB,, | Performed by: STUDENT IN AN ORGANIZED HEALTH CARE EDUCATION/TRAINING PROGRAM

## 2025-02-24 PROCEDURE — 99024 POSTOP FOLLOW-UP VISIT: CPT | Mod: S$GLB,,, | Performed by: STUDENT IN AN ORGANIZED HEALTH CARE EDUCATION/TRAINING PROGRAM

## 2025-02-24 PROCEDURE — 3074F SYST BP LT 130 MM HG: CPT | Mod: CPTII,S$GLB,, | Performed by: STUDENT IN AN ORGANIZED HEALTH CARE EDUCATION/TRAINING PROGRAM

## 2025-02-24 PROCEDURE — 99999 PR PBB SHADOW E&M-EST. PATIENT-LVL III: CPT | Mod: PBBFAC,,, | Performed by: STUDENT IN AN ORGANIZED HEALTH CARE EDUCATION/TRAINING PROGRAM

## 2025-02-24 NOTE — PROGRESS NOTES
"General Surgery Clinic  Progress Note    SUBJECTIVE:   No chief complaint on file.    History of Present Illness:  Megha Machado is a 39 y.o. female is an established patient of this practice who presents today for follow up visit. She is 1 week s/p robotic cholecystectomy on . She tolerated the procedure well and was discharged the same day. She presents to clinic early for concerns of drainage from one of her incision sites.    Review of patient's allergies indicates:   Allergen Reactions    Adhesive      tape    Penicillins        Current Medications[1]    No past medical history on file.  Past Surgical History:   Procedure Laterality Date     SECTION  06, 08, 16    D&C      ROBOT-ASSISTED CHOLECYSTECTOMY N/A 2025    Procedure: ROBOTIC CHOLECYSTECTOMY;  Surgeon: Valentín Blake MD;  Location: [facility];  Service: General;  Laterality: N/A;    TUBAL LIGATION  2016     Family History   Problem Relation Name Age of Onset    Heart disease Maternal Grandmother      Heart disease Paternal Grandmother       Social History[2]     Review of Systems:  Review of Systems   Constitutional:  Negative for chills and fever.   Respiratory:  Negative for cough and shortness of breath.    Cardiovascular:  Negative for chest pain and palpitations.   Gastrointestinal:  Negative for abdominal pain, nausea and vomiting.   Genitourinary:  Negative for dysuria.   Skin:  Positive for rash.   Neurological:  Negative for dizziness and light-headedness.       OBJECTIVE:     Vital Signs (Most Recent)  Temp: 97 °F (36.1 °C) (25 1119)  Pulse: 60 (25 1119)  BP: 128/75 (25 1119)  5' 1" (1.549 m)  63.1 kg (139 lb 1.8 oz)   Physical Exam:  Physical Exam  Vitals reviewed.   Constitutional:       Appearance: Normal appearance.   Cardiovascular:      Rate and Rhythm: Normal rate.      Pulses: Normal pulses.   Pulmonary:      Effort: Pulmonary effort is normal.   Abdominal:      General: There is " no distension.      Palpations: Abdomen is soft. There is no mass.      Tenderness: There is no abdominal tenderness. There is no guarding.      Comments: Hives surrounding her incision sites. Dermabond and steri strips removed.    Skin:     General: Skin is warm and dry.   Neurological:      General: No focal deficit present.      Mental Status: She is alert and oriented to person, place, and time.         Laboratory  Available labs reviewed.    Diagnostic Results:  Available imaging and diagnostic studies reviewed.    ASSESSMENT/PLAN:     39 y.o. female s/p robotic cholecystectomy on 2/17. She unfortunately had an allergic reaction to the steri strips/dermabond. Otherwise incision sites healing appropriately. Path benign.    PLAN:  Topical creams recommended with benadryl prn  Follow up as needed    Deyvi Shelby MD  [facility] PGY4         [1]   Current Outpatient Medications   Medication Sig Dispense Refill    acetaminophen (TYLENOL) 325 MG tablet Take 2 tablets (650 mg total) by mouth every 6 (six) hours.      ibuprofen (ADVIL,MOTRIN) 400 MG tablet Take 1 tablet (400 mg total) by mouth every 6 (six) hours.      MULTIVITAMIN ORAL Take 1 tablet by mouth once daily.      FLUoxetine 40 MG capsule Take 1 capsule (40 mg total) by mouth once daily. (Patient not taking: Reported on 2/24/2025) 30 capsule 11    oxyCODONE (ROXICODONE) 5 MG immediate release tablet Take 1 tablet (5 mg total) by mouth every 6 (six) hours as needed for Pain (pain 2-5/10 pain scale if patient is able to swallow. can be given along with IV PRN pain medication.). (Patient not taking: Reported on 2/19/2025) 10 tablet 0     No current facility-administered medications for this visit.   [2]   Social History  Tobacco Use    Smoking status: Never    Smokeless tobacco: Never   Substance Use Topics    Alcohol use: Yes     Alcohol/week: 1.0 standard drink of alcohol     Types: 1 Glasses of wine per week    Drug use: Never

## 2025-02-24 NOTE — TELEPHONE ENCOUNTER
02/24/25                  0836  Spoke to patient regarding her drainage. Pt would like come see Dr. Blake to take a look at her incision. Informed her Dr. Blake would like to see her today.  would see her in between surgery case. Pt verbalized understanding.

## 2025-02-25 ENCOUNTER — NURSE TRIAGE (OUTPATIENT)
Dept: ADMINISTRATIVE | Facility: CLINIC | Age: 40
End: 2025-02-25
Payer: COMMERCIAL

## 2025-02-25 ENCOUNTER — TELEPHONE (OUTPATIENT)
Dept: SURGERY | Facility: CLINIC | Age: 40
End: 2025-02-25
Payer: COMMERCIAL

## 2025-02-25 ENCOUNTER — PATIENT MESSAGE (OUTPATIENT)
Dept: PRIMARY CARE CLINIC | Facility: CLINIC | Age: 40
End: 2025-02-25
Payer: COMMERCIAL

## 2025-02-25 ENCOUNTER — OFFICE VISIT (OUTPATIENT)
Dept: SURGERY | Facility: CLINIC | Age: 40
End: 2025-02-25
Payer: COMMERCIAL

## 2025-02-25 VITALS
BODY MASS INDEX: 26.27 KG/M2 | WEIGHT: 139.13 LBS | HEIGHT: 61 IN | HEART RATE: 62 BPM | DIASTOLIC BLOOD PRESSURE: 77 MMHG | TEMPERATURE: 98 F | SYSTOLIC BLOOD PRESSURE: 128 MMHG

## 2025-02-25 DIAGNOSIS — K80.00 CALCULUS OF GALLBLADDER WITH ACUTE CHOLECYSTITIS WITHOUT OBSTRUCTION: Primary | ICD-10-CM

## 2025-02-25 PROCEDURE — 99024 POSTOP FOLLOW-UP VISIT: CPT | Mod: S$GLB,,, | Performed by: STUDENT IN AN ORGANIZED HEALTH CARE EDUCATION/TRAINING PROGRAM

## 2025-02-25 PROCEDURE — 99999 PR PBB SHADOW E&M-EST. PATIENT-LVL III: CPT | Mod: PBBFAC,,, | Performed by: STUDENT IN AN ORGANIZED HEALTH CARE EDUCATION/TRAINING PROGRAM

## 2025-02-25 PROCEDURE — 3078F DIAST BP <80 MM HG: CPT | Mod: CPTII,S$GLB,, | Performed by: STUDENT IN AN ORGANIZED HEALTH CARE EDUCATION/TRAINING PROGRAM

## 2025-02-25 PROCEDURE — 3074F SYST BP LT 130 MM HG: CPT | Mod: CPTII,S$GLB,, | Performed by: STUDENT IN AN ORGANIZED HEALTH CARE EDUCATION/TRAINING PROGRAM

## 2025-02-25 PROCEDURE — 1159F MED LIST DOCD IN RCRD: CPT | Mod: CPTII,S$GLB,, | Performed by: STUDENT IN AN ORGANIZED HEALTH CARE EDUCATION/TRAINING PROGRAM

## 2025-02-25 NOTE — TELEPHONE ENCOUNTER
Patient c/o a bright red rash to her abdomen following gallbladder surgery. Per protocol will contact the on call provider. Per Dr. Eduardo, will speak with the patient directly. Call transferred with a warm handoff.     Reason for Disposition   Bright red, sunburn-like rash and wound infection or recent surgery    Additional Information   Negative: Sudden onset of rash (within last 2 hours) and difficulty with breathing or swallowing   Negative: Difficult to awaken or acting confused (e.g., disoriented, slurred speech)   Negative: Fever and purple or blood-colored spots or dots   Negative: Too weak or sick to stand   Negative: Life-threatening reaction (anaphylaxis) in the past to similar substance (e.g., food, insect bite/sting, chemical, etc.) and < 2 hours since exposure   Negative: Sounds like a life-threatening emergency to the triager   Negative: Bright red, sunburn-like rash and current tampon use or nasal packing    Protocols used: Rash or Redness - Widespread-A-OH

## 2025-02-25 NOTE — PROGRESS NOTES
[facility] - Post operative visit note          Ms. Machado returns to clinic for postoperative visit after undergoing cholecystectomy on 2/17/2025.    She has no acute complaints; she is tolerating a diet without issue, pain is minimal and controlled with OTC medications, ambulating and performing ADLs without issue, and incisions are clean/dry/intact with no gross signs of infection. Pathology results discussed with the patient.     Though incisions remain overtly not infected, she does have a significant skin reaction surrounding each incision and in the right antecubital fossa where there was adhesive tape.     We discussed removing any remaining dermabond with alcohol at home. I also recommended skin moisturizer to help with itching, in addition to the itch creams she is using and the antihistamines.    She will let me know if it is getting better/worse this week           Valentín Blake MD, FACS  General and Endocrine Surgery       Final Pathologic Diagnosis (no units)   Date Value   02/17/2025     Gallbladder, cholecystectomy:   Acute and chronic cholecystitis with cholelithiasis.

## 2025-02-25 NOTE — TELEPHONE ENCOUNTER
02/25/25            0919  Spoke to patient to informed pt Dr. Blake would like to see her in office visit today for her rash. Pt is schedule for 02/25/25 at 10:30 am. Pt verbalized understanding.

## 2025-08-05 ENCOUNTER — PATIENT MESSAGE (OUTPATIENT)
Dept: PRIMARY CARE CLINIC | Facility: CLINIC | Age: 40
End: 2025-08-05
Payer: COMMERCIAL

## 2025-08-05 NOTE — TELEPHONE ENCOUNTER
Last VV with Julio Parker MD , 4/24/2024 - depression  The patient's last ANNUAL visit was on 8/23/2022.  2/17/25 robotic michelle.

## (undated) DEVICE — TROCAR ENDOPATH XCEL 5X100MM

## (undated) DEVICE — MANIFOLD 4 PORT

## (undated) DEVICE — ELECTRODE REM PLYHSV RETURN 9

## (undated) DEVICE — Device

## (undated) DEVICE — SEAL CANN UNIVERSAL 5-12MM

## (undated) DEVICE — GOWN POLY REINF BRTH SLV XL

## (undated) DEVICE — NDL HYPO REG 25G X 1 1/2

## (undated) DEVICE — SUT MCRYL PLUS 4-0 PS2 27IN

## (undated) DEVICE — COVER LIGHT HANDLE 80/CA

## (undated) DEVICE — SET TUBE DAVINCI 5 HI FLOW INS

## (undated) DEVICE — DRAPE ARM DAVINCI XI

## (undated) DEVICE — DRAPE UTILITY STRL 15X26IN

## (undated) DEVICE — SUT VICRYL+ 27 UR-6 VIOL

## (undated) DEVICE — COVER TIP CURVED SCISSORS XI

## (undated) DEVICE — GLOVE SURG BIOGEL LATEX SZ 7.5

## (undated) DEVICE — BAG TISSUE RETRIEVAL 5MM

## (undated) DEVICE — DRAPE COLUMN DAVINCI XI

## (undated) DEVICE — PAD PINK TRENDELENBURG POS XL

## (undated) DEVICE — GOWN POLY REINF BRTH SLV LG

## (undated) DEVICE — OBTURATOR BLADELESS 8MM XI CLR

## (undated) DEVICE — CLIP HEMO-LOK MLX LARGE LF

## (undated) DEVICE — GLOVE SENSICARE PI SURG 7.5